# Patient Record
Sex: FEMALE | Race: WHITE | NOT HISPANIC OR LATINO | Employment: OTHER | URBAN - METROPOLITAN AREA
[De-identification: names, ages, dates, MRNs, and addresses within clinical notes are randomized per-mention and may not be internally consistent; named-entity substitution may affect disease eponyms.]

---

## 2017-06-30 ENCOUNTER — HOSPITAL ENCOUNTER (OUTPATIENT)
Dept: RADIOLOGY | Facility: MEDICAL CENTER | Age: 67
End: 2017-06-30
Attending: FAMILY MEDICINE
Payer: MEDICARE

## 2017-06-30 ENCOUNTER — OFFICE VISIT (OUTPATIENT)
Dept: MEDICAL GROUP | Facility: PHYSICIAN GROUP | Age: 67
End: 2017-06-30
Payer: MEDICARE

## 2017-06-30 ENCOUNTER — TELEPHONE (OUTPATIENT)
Dept: MEDICAL GROUP | Facility: PHYSICIAN GROUP | Age: 67
End: 2017-06-30

## 2017-06-30 VITALS
HEART RATE: 104 BPM | RESPIRATION RATE: 12 BRPM | TEMPERATURE: 98.4 F | DIASTOLIC BLOOD PRESSURE: 78 MMHG | BODY MASS INDEX: 18.99 KG/M2 | SYSTOLIC BLOOD PRESSURE: 110 MMHG | WEIGHT: 121 LBS | OXYGEN SATURATION: 96 % | HEIGHT: 67 IN

## 2017-06-30 DIAGNOSIS — R55 SYNCOPE, UNSPECIFIED SYNCOPE TYPE: ICD-10-CM

## 2017-06-30 DIAGNOSIS — M79.89 LEG SWELLING: ICD-10-CM

## 2017-06-30 PROCEDURE — 99214 OFFICE O/P EST MOD 30 MIN: CPT | Performed by: FAMILY MEDICINE

## 2017-06-30 PROCEDURE — 93971 EXTREMITY STUDY: CPT | Mod: LT

## 2017-06-30 ASSESSMENT — PATIENT HEALTH QUESTIONNAIRE - PHQ9: CLINICAL INTERPRETATION OF PHQ2 SCORE: 0

## 2017-06-30 NOTE — PROGRESS NOTES
Chief Complaint   Patient presents with   • Faint     fainted 06/14/2017   • Leg Swelling     lft leg swelling       HISTORY OF PRESENT ILLNESS: Patient is a 66 y.o. female established patient here today for the following concerns:    1. Syncope, unspecified syncope type  Regina is here with concerns over episode of syncope on 6/14/17.  She reports that they had been rushing out that day and forgot to pack a lunch and had been working hard.  They stopped for a sandwich while in a small store. As she ate her sandwich and began feeling nauseated and dizzy and had passed out.  No injury and recovered consciousness quickly without any residual injury.  Since then has not had any problems with dizziness.  She reports that she had not been drinking much water that day.  No further episodes.  No CP, palpitations, or diarrhea.     2. Leg swelling  She has noted in the last week left leg swelling associated with muscle tightness.  No redness, streaking.  She does a lot of car travel.  No SOB, no CP.        Past Medical, Social, and Family history reviewed and updated in EPIC    Allergies:Penicillins    Current Outpatient Prescriptions   Medication Sig Dispense Refill   • Vitamin D, Cholecalciferol, 1000 UNITS Cap Take  by mouth.     • Omega-3 Fatty Acids (FISH OIL) 1000 MG Cap capsule Take 1,000 mg by mouth 3 times a day, with meals.     • aspirin EC (ECOTRIN) 81 MG Tablet Delayed Response Take 81 mg by mouth every day.     • CALCIUM & MAGNESIUM CARBONATES PO Take  by mouth.     • Zinc Sulfate (ZINC 15 PO) Take  by mouth.       No current facility-administered medications for this visit.         ROS:  Review of Systems   Constitutional: Negative for fever, chills, weight loss and malaise/fatigue.   HENT: Negative for ear pain, nosebleeds, congestion, sore throat and neck pain.    Eyes: Negative for blurred vision.   Respiratory: Negative for cough, sputum production, shortness of breath and wheezing.    Cardiovascular:  "Negative for chest pain, palpitations,  and leg swelling.   Gastrointestinal: Negative for heartburn, nausea, vomiting, diarrhea and abdominal pain.   Genitourinary: Negative for dysuria, urgency and frequency.   Musculoskeletal: Negative for myalgias, back pain and joint pain.   Skin: Negative for rash and itching.   Neurological: Negative for dizziness, tingling, tremors, sensory change, focal weakness and headaches.   Endo/Heme/Allergies: Does not bruise/bleed easily.   Psychiatric/Behavioral: Negative for depression, anxiety, suicidal ideas, insomnia and memory loss.      Exam:  Blood pressure 110/78, pulse 104, temperature 36.9 °C (98.4 °F), resp. rate 12, height 1.689 m (5' 6.5\"), weight 54.885 kg (121 lb), SpO2 96 %.  Filed Vitals:    06/30/17 1302 06/30/17 1326 06/30/17 1327 06/30/17 1328   BP: 124/86 116/80 118/72 110/78   Pulse: 98 84 90 104   Temp: 36.9 °C (98.4 °F)      Resp: 12      Height: 1.689 m (5' 6.5\")      Weight: 54.885 kg (121 lb)      SpO2: 96%        General:  Well nourished, well developed in NAD  Head is grossly normal.  Neck: Supple without JVD   Pulmonary:  Normal effort.   Cardiovascular: Regular rate  Extremities: no clubbing, cyanosis, or LEft leg swelling with clearly greater diameter, no calf pain.  Negative Genny 's sign  Psych: affect appropriate      Please note that this dictation was created using voice recognition software. I have made every reasonable attempt to correct obvious errors, but I expect that there are errors of grammar and possibly content that I did not discover before finalizing the note.    Assessment/Plan:  1. Syncope, unspecified syncope type  Suspect that this vasovagal vs orthostatic syncope.  Increase hydration.   - EKG; Future  EKG Interpretation-HR is NSR normal EKG, normal sinus rhythm, no evidence of ischemia         2. Leg swelling  Check   - US-EXTREMITY VENOUS UNILATERAL-LOWER LEFT; Future  R/O DVT            "

## 2017-06-30 NOTE — MR AVS SNAPSHOT
"Radhayl Regina Kayli   2017 1:00 PM   Office Visit   MRN: [id number]    Department:  [facility]   Dept Phone:  [phone]    Description:  Female : 1950   Provider:  Rachelle Isaac M.D.           Reason for Visit     Faint fainted 2017    Leg Swelling lft leg swelling      Allergies as of 2017     Allergen Noted Reactions    Penicillins 2016   Rash    Minor RASH      You were diagnosed with     Syncope, unspecified syncope type   [5640607]       Leg swelling   [[id number]]         Vital Signs     Blood Pressure Pulse Temperature Respirations Height Weight    110/78 mmHg 104 36.9 °C (98.4 °F) 12 1.689 m (5' 6.5\") 54.885 kg (121 lb)    Body Mass Index Oxygen Saturation Smoking Status             19.24 kg/m2 96% Never Smoker          Basic Information     Date Of Birth Sex Race Ethnicity Preferred Language    1950 Female White Non- English      Your appointments     2017  4:30 PM   US EXTREMITY (30) A with [address] US 2   [facility] - ULTRASOUND - [address]    [address]  [place] [address]   [phone]              Problem List              ICD-10-CM Priority Class Noted - Resolved    Visit for screening mammogram Z12.31   2016 - Present    Preventative health care Z00.00   2016 - Present    Screening for colon cancer Z12.11   2016 - Present    Abnormal bone density screening R93.7   2016 - Present      Health Maintenance        Date Due Completion Dates    IMM DTaP/Tdap/Td Vaccine (1 - Tdap) 1968 ---    COLONOSCOPY 1999 ---    IMM ZOSTER VACCINE 2009 ---    BONE DENSITY 2014 ---    IMM PNEUMOCOCCAL 65+ (ADULT) LOW/MEDIUM RISK SERIES (2 of 2 - PPSV23) 2017    MAMMOGRAM 2018, 2016, 2015    PAP SMEAR 2019, 10/21/2014            Current Immunizations     13-VALENT PCV PREVNAR 2016,  Deferred (Patient Schedule)      Below and/or " attached are the medications your provider expects you to take. Review all of your home medications and newly ordered medications with your provider and/or pharmacist. Follow medication instructions as directed by your provider and/or pharmacist. Please keep your medication list with you and share with your provider. Update the information when medications are discontinued, doses are changed, or new medications (including over-the-counter products) are added; and carry medication information at all times in the event of emergency situations     Allergies:  PENICILLINS - Rash               Medications  Valid as of: June 30, 2017 -  2:18 PM    Generic Name Brand Name Tablet Size Instructions for use    Aspirin (Tablet Delayed Response) ECOTRIN 81 MG Take 81 mg by mouth every day.        Calcium & Magnesium Carbonates   Take  by mouth.        Cholecalciferol (Cap) Vitamin D (Cholecalciferol) 1000 UNITS Take  by mouth.        Omega-3 Fatty Acids (Cap) fish oil 1000 MG Take 1,000 mg by mouth 3 times a day, with meals.        Zinc Sulfate   Take  by mouth.        .                 Medicines prescribed today were sent to:     [facility][id number] - [place] - [address] [place] [address]    Phone: [phone] Fax: [phone]    Open 24 Hours?: No      Medication refill instructions:       If your prescription bottle indicates you have medication refills left, it is not necessary to call your provider’s office. Please contact your pharmacy and they will refill your medication.    If your prescription bottle indicates you do not have any refills left, you may request refills at any time through one of the following ways: The online [facility]EE system (except Urgent Care), by calling your provider’s office, or by asking your pharmacy to contact your provider’s office with a refill request. Medication refills are processed only during regular business hours and may not be available until the next business day. Your  provider may request additional information or to have a follow-up visit with you prior to refilling your medication.   *Please Note: Medication refills are assigned a new Rx number when refilled electronically. Your pharmacy may indicate that no refills were authorized even though a new prescription for the same medication is available at the pharmacy. Please request the medicine by name with the pharmacy before contacting your provider for a refill.        Your To Do List     Future Labs/Procedures Complete By Expires    EKG  As directed 6/30/2018    US-EXTREMITY VENOUS UNILATERAL-LOWER LEFT  As directed 6/30/2018         [employer] Access Code: Activation code not generated  Current [email or web address] Status: Active

## 2017-07-01 NOTE — TELEPHONE ENCOUNTER
----- Message from Rachelle Isaac M.D. sent at 6/30/2017  5:17 PM PDT -----  Ultrasound shows no blood clots!

## 2017-07-09 ENCOUNTER — APPOINTMENT (OUTPATIENT)
Dept: RADIOLOGY | Facility: MEDICAL CENTER | Age: 67
End: 2017-07-09
Attending: EMERGENCY MEDICINE
Payer: MEDICARE

## 2017-07-09 ENCOUNTER — RESOLUTE PROFESSIONAL BILLING HOSPITAL PROF FEE (OUTPATIENT)
Dept: HOSPITALIST | Facility: MEDICAL CENTER | Age: 67
End: 2017-07-09
Payer: MEDICARE

## 2017-07-09 ENCOUNTER — HOSPITAL ENCOUNTER (OUTPATIENT)
Facility: MEDICAL CENTER | Age: 67
End: 2017-07-11
Attending: EMERGENCY MEDICINE | Admitting: HOSPITALIST
Payer: MEDICARE

## 2017-07-09 ENCOUNTER — APPOINTMENT (OUTPATIENT)
Dept: RADIOLOGY | Facility: MEDICAL CENTER | Age: 67
End: 2017-07-09
Attending: HOSPITALIST
Payer: MEDICARE

## 2017-07-09 DIAGNOSIS — M79.89 LEG SWELLING: ICD-10-CM

## 2017-07-09 DIAGNOSIS — R55 SYNCOPE, UNSPECIFIED SYNCOPE TYPE: ICD-10-CM

## 2017-07-09 LAB
ALBUMIN SERPL BCP-MCNC: 2.9 G/DL (ref 3.2–4.9)
ALBUMIN/GLOB SERPL: 1 G/DL
ALP SERPL-CCNC: 70 U/L (ref 30–99)
ALT SERPL-CCNC: 18 U/L (ref 2–50)
ANION GAP SERPL CALC-SCNC: 10 MMOL/L (ref 0–11.9)
AST SERPL-CCNC: 28 U/L (ref 12–45)
BASOPHILS # BLD AUTO: 0.4 % (ref 0–1.8)
BASOPHILS # BLD: 0.03 K/UL (ref 0–0.12)
BILIRUB SERPL-MCNC: 0.4 MG/DL (ref 0.1–1.5)
BUN SERPL-MCNC: 8 MG/DL (ref 8–22)
CALCIUM SERPL-MCNC: 8.2 MG/DL (ref 8.5–10.5)
CHLORIDE SERPL-SCNC: 101 MMOL/L (ref 96–112)
CO2 SERPL-SCNC: 24 MMOL/L (ref 20–33)
CORTIS SERPL-MCNC: 13.8 UG/DL (ref 0–23)
CREAT SERPL-MCNC: 0.82 MG/DL (ref 0.5–1.4)
DEPRECATED D DIMER PPP IA-ACNC: 204 NG/ML(D-DU)
EKG IMPRESSION: NORMAL
EOSINOPHIL # BLD AUTO: 0.1 K/UL (ref 0–0.51)
EOSINOPHIL NFR BLD: 1.4 % (ref 0–6.9)
ERYTHROCYTE [DISTWIDTH] IN BLOOD BY AUTOMATED COUNT: 44.2 FL (ref 35.9–50)
GFR SERPL CREATININE-BSD FRML MDRD: >60 ML/MIN/1.73 M 2
GLOBULIN SER CALC-MCNC: 2.8 G/DL (ref 1.9–3.5)
GLUCOSE SERPL-MCNC: 112 MG/DL (ref 65–99)
HCT VFR BLD AUTO: 35.9 % (ref 37–47)
HGB BLD-MCNC: 11.9 G/DL (ref 12–16)
IMM GRANULOCYTES # BLD AUTO: 0.03 K/UL (ref 0–0.11)
IMM GRANULOCYTES NFR BLD AUTO: 0.4 % (ref 0–0.9)
LYMPHOCYTES # BLD AUTO: 2.09 K/UL (ref 1–4.8)
LYMPHOCYTES NFR BLD: 30.2 % (ref 22–41)
MCH RBC QN AUTO: 30.5 PG (ref 27–33)
MCHC RBC AUTO-ENTMCNC: 33.1 G/DL (ref 33.6–35)
MCV RBC AUTO: 92.1 FL (ref 81.4–97.8)
MONOCYTES # BLD AUTO: 0.73 K/UL (ref 0–0.85)
MONOCYTES NFR BLD AUTO: 10.6 % (ref 0–13.4)
NEUTROPHILS # BLD AUTO: 3.93 K/UL (ref 2–7.15)
NEUTROPHILS NFR BLD: 57 % (ref 44–72)
NRBC # BLD AUTO: 0 K/UL
NRBC BLD AUTO-RTO: 0 /100 WBC
PLATELET # BLD AUTO: 435 K/UL (ref 164–446)
PMV BLD AUTO: 9.6 FL (ref 9–12.9)
POTASSIUM SERPL-SCNC: 2.8 MMOL/L (ref 3.6–5.5)
PROT SERPL-MCNC: 5.7 G/DL (ref 6–8.2)
RBC # BLD AUTO: 3.9 M/UL (ref 4.2–5.4)
SODIUM SERPL-SCNC: 135 MMOL/L (ref 135–145)
TROPONIN I SERPL-MCNC: <0.01 NG/ML (ref 0–0.04)
TSH SERPL DL<=0.005 MIU/L-ACNC: 1.65 UIU/ML (ref 0.3–3.7)
WBC # BLD AUTO: 6.9 K/UL (ref 4.8–10.8)

## 2017-07-09 PROCEDURE — A9270 NON-COVERED ITEM OR SERVICE: HCPCS | Performed by: EMERGENCY MEDICINE

## 2017-07-09 PROCEDURE — 82607 VITAMIN B-12: CPT

## 2017-07-09 PROCEDURE — 700102 HCHG RX REV CODE 250 W/ 637 OVERRIDE(OP): Performed by: EMERGENCY MEDICINE

## 2017-07-09 PROCEDURE — 93005 ELECTROCARDIOGRAM TRACING: CPT

## 2017-07-09 PROCEDURE — 70450 CT HEAD/BRAIN W/O DYE: CPT

## 2017-07-09 PROCEDURE — 36415 COLL VENOUS BLD VENIPUNCTURE: CPT

## 2017-07-09 PROCEDURE — 700105 HCHG RX REV CODE 258: Performed by: EMERGENCY MEDICINE

## 2017-07-09 PROCEDURE — G0378 HOSPITAL OBSERVATION PER HR: HCPCS

## 2017-07-09 PROCEDURE — 85379 FIBRIN DEGRADATION QUANT: CPT

## 2017-07-09 PROCEDURE — 80053 COMPREHEN METABOLIC PANEL: CPT

## 2017-07-09 PROCEDURE — 71010 DX-CHEST-PORTABLE (1 VIEW): CPT

## 2017-07-09 PROCEDURE — 84443 ASSAY THYROID STIM HORMONE: CPT

## 2017-07-09 PROCEDURE — 84484 ASSAY OF TROPONIN QUANT: CPT

## 2017-07-09 PROCEDURE — 99220 PR INITIAL OBSERVATION CARE,LEVL III: CPT | Performed by: HOSPITALIST

## 2017-07-09 PROCEDURE — 82533 TOTAL CORTISOL: CPT

## 2017-07-09 PROCEDURE — 82728 ASSAY OF FERRITIN: CPT

## 2017-07-09 PROCEDURE — 99285 EMERGENCY DEPT VISIT HI MDM: CPT

## 2017-07-09 PROCEDURE — 85025 COMPLETE CBC W/AUTO DIFF WBC: CPT

## 2017-07-09 PROCEDURE — 96360 HYDRATION IV INFUSION INIT: CPT

## 2017-07-09 PROCEDURE — 86780 TREPONEMA PALLIDUM: CPT

## 2017-07-09 RX ORDER — BISACODYL 10 MG
10 SUPPOSITORY, RECTAL RECTAL
Status: DISCONTINUED | OUTPATIENT
Start: 2017-07-09 | End: 2017-07-11 | Stop reason: HOSPADM

## 2017-07-09 RX ORDER — SODIUM CHLORIDE 9 MG/ML
2000 INJECTION, SOLUTION INTRAVENOUS ONCE
Status: COMPLETED | OUTPATIENT
Start: 2017-07-09 | End: 2017-07-11

## 2017-07-09 RX ORDER — ACETAMINOPHEN 325 MG/1
650 TABLET ORAL EVERY 6 HOURS PRN
Status: DISCONTINUED | OUTPATIENT
Start: 2017-07-09 | End: 2017-07-11 | Stop reason: HOSPADM

## 2017-07-09 RX ORDER — GINSENG 100 MG
50 CAPSULE ORAL EVERY MORNING
COMMUNITY
End: 2020-07-03

## 2017-07-09 RX ORDER — POLYETHYLENE GLYCOL 3350 17 G/17G
1 POWDER, FOR SOLUTION ORAL
Status: DISCONTINUED | OUTPATIENT
Start: 2017-07-09 | End: 2017-07-11 | Stop reason: HOSPADM

## 2017-07-09 RX ORDER — AMOXICILLIN 250 MG
2 CAPSULE ORAL 2 TIMES DAILY
Status: DISCONTINUED | OUTPATIENT
Start: 2017-07-09 | End: 2017-07-11 | Stop reason: HOSPADM

## 2017-07-09 RX ORDER — ASPIRIN 300 MG/1
300 SUPPOSITORY RECTAL ONCE
Status: COMPLETED | OUTPATIENT
Start: 2017-07-09 | End: 2017-07-09

## 2017-07-09 RX ORDER — SODIUM CHLORIDE 9 MG/ML
1000 INJECTION, SOLUTION INTRAVENOUS ONCE
Status: COMPLETED | OUTPATIENT
Start: 2017-07-09 | End: 2017-07-10

## 2017-07-09 RX ORDER — CHLORAL HYDRATE 500 MG
1000 CAPSULE ORAL EVERY MORNING
COMMUNITY
End: 2020-07-03

## 2017-07-09 RX ORDER — IBUPROFEN 200 MG
950 CAPSULE ORAL DAILY
COMMUNITY
End: 2018-08-13 | Stop reason: CLARIF

## 2017-07-09 RX ORDER — ASPIRIN 81 MG/1
324 TABLET, CHEWABLE ORAL ONCE
Status: COMPLETED | OUTPATIENT
Start: 2017-07-09 | End: 2017-07-09

## 2017-07-09 RX ADMIN — SODIUM CHLORIDE 1000 ML: 9 INJECTION, SOLUTION INTRAVENOUS at 19:25

## 2017-07-09 RX ADMIN — ASPIRIN 324 MG: 81 TABLET, CHEWABLE ORAL at 19:26

## 2017-07-09 ASSESSMENT — LIFESTYLE VARIABLES
DO YOU DRINK ALCOHOL: NO
EVER_SMOKED: NEVER
ALCOHOL_USE: NO

## 2017-07-09 ASSESSMENT — COGNITIVE AND FUNCTIONAL STATUS - GENERAL
MOBILITY SCORE: 24
DAILY ACTIVITIY SCORE: 24
SUGGESTED CMS G CODE MODIFIER MOBILITY: CH
SUGGESTED CMS G CODE MODIFIER DAILY ACTIVITY: CH

## 2017-07-09 ASSESSMENT — PAIN SCALES - GENERAL
PAINLEVEL_OUTOF10: 0
PAINLEVEL_OUTOF10: 0

## 2017-07-09 NOTE — IP AVS SNAPSHOT
" Home Care Instructions                                                                                                                  Name:Azul Milan  Medical Record Number:[id number]  CSN: [id number]    YOB: 1950   Age: 66 y.o.  Sex: female  HT:1.702 m (5' 7\") WT: 55.3 kg (121 lb 14.6 oz)          Admit Date: 7/9/2017     Discharge Date:   Today's Date: 7/11/2017  Attending Doctor:  Rodriguez Gonzales M.D.                  Allergies:  Penicillins            Discharge Instructions       Discharge Instructions    Discharged to home by car with relative. Discharged via walking, hospital escort: Refused.  Special equipment needed: Not Applicable    Be sure to schedule a follow-up appointment with your primary care doctor or any specialists as instructed.     Discharge Plan:   Diet Plan: Discussed  Activity Level: Discussed  Confirmed Follow up Appointment: Patient to Call and Schedule Appointment  Confirmed Symptoms Management: Discussed  Medication Reconciliation Updated: Yes  Influenza Vaccine Indication: Not indicated: Previously immunized this influenza season and > 8 years of age    I understand that a diet low in cholesterol, fat, and sodium is recommended for good health. Unless I have been given specific instructions below for another diet, I accept this instruction as my diet prescription.   Other diet: Healthy diet    Special Instructions: None    · Is patient discharged on Warfarin / Coumadin?   No     · Is patient Post Blood Transfusion?  No    Depression / Suicide Risk    As you are discharged from this [facility] facility, it is important to learn how to keep safe from harming yourself.    Recognize the warning signs:  · Abrupt changes in personality, positive or negative- including increase in energy   · Giving away possessions  · Change in eating patterns- significant weight changes-  positive or negative  · Change in sleeping patterns- unable to sleep or sleeping all the " time   · Unwillingness or inability to communicate  · Depression  · Unusual sadness, discouragement and loneliness  · Talk of wanting to die  · Neglect of personal appearance   · Rebelliousness- reckless behavior  · Withdrawal from people/activities they love  · Confusion- inability to concentrate     If you or a loved one observes any of these behaviors or has concerns about self-harm, here's what you can do:  · Talk about it- your feelings and reasons for harming yourself  · Remove any means that you might use to hurt yourself (examples: pills, rope, extension cords, firearm)  · Get professional help from the community (Mental Health, Substance Abuse, psychological counseling)  · Do not be alone:Call your Safe Contact- someone whom you trust who will be there for you.  · Call your local CRISIS HOTLINE 718-2391 or 071-196-4576  · Call your local Children's Mobile Crisis Response Team [place] (971) 945-8185 or www.DancingAnchovy  · Call the toll free National Suicide Prevention Hotlines   · National Suicide Prevention Lifeline 948-386-PDBO (7706)  · [employer] Hope Line Network 800-SUICIDE (372-1585)    Syncope  Syncope is a medical term for fainting or passing out. This means you lose consciousness and drop to the ground. People are generally unconscious for less than 5 minutes. You may have some muscle twitches for up to 15 seconds before waking up and returning to normal. Syncope occurs more often in older adults, but it can happen to anyone. While most causes of syncope are not dangerous, syncope can be a sign of a serious medical problem. It is important to seek medical care.   CAUSES   Syncope is caused by a sudden drop in blood flow to the brain. The specific cause is often not determined. Factors that can bring on syncope include:  · Taking medicines that lower blood pressure.  · Sudden changes in posture, such as standing up quickly.  · Taking more medicine than prescribed.  · Standing in one place  for too long.  · Seizure disorders.  · Dehydration and excessive exposure to heat.  · Low blood sugar (hypoglycemia).  · Straining to have a bowel movement.  · Heart disease, irregular heartbeat, or other circulatory problems.  · Fear, emotional distress, seeing blood, or severe pain.  SYMPTOMS   Right before fainting, you may:  · Feel dizzy or light-headed.  · Feel nauseous.  · See all white or all black in your field of vision.  · Have cold, clammy skin.  DIAGNOSIS   Your health care provider will ask about your symptoms, perform a physical exam, and perform an electrocardiogram (ECG) to record the electrical activity of your heart. Your health care provider may also perform other heart or blood tests to determine the cause of your syncope which may include:  · Transthoracic echocardiogram (TTE). During echocardiography, sound waves are used to evaluate how blood flows through your heart.  · Transesophageal echocardiogram (NAHID).  · Cardiac monitoring. This allows your health care provider to monitor your heart rate and rhythm in real time.  · Holter monitor. This is a portable device that records your heartbeat and can help diagnose heart arrhythmias. It allows your health care provider to track your heart activity for several days, if needed.  · Stress tests by exercise or by giving medicine that makes the heart beat faster.  TREATMENT   In most cases, no treatment is needed. Depending on the cause of your syncope, your health care provider may recommend changing or stopping some of your medicines.  HOME CARE INSTRUCTIONS  · Have someone stay with you until you feel stable.  · Do not drive, use machinery, or play sports until your health care provider says it is okay.  · Keep all follow-up appointments as directed by your health care provider.  · Lie down right away if you start feeling like you might faint. Breathe deeply and steadily. Wait until all the symptoms have passed.  · Drink enough fluids to keep your  urine clear or pale yellow.  · If you are taking blood pressure or heart medicine, get up slowly and take several minutes to sit and then stand. This can reduce dizziness.  SEEK IMMEDIATE MEDICAL CARE IF:   · You have a severe headache.  · You have unusual pain in the chest, abdomen, or back.  · You are bleeding from your mouth or rectum, or you have black or tarry stool.  · You have an irregular or very fast heartbeat.  · You have pain with breathing.  · You have repeated fainting or seizure-like jerking during an episode.  · You faint when sitting or lying down.  · You have confusion.  · You have trouble walking.  · You have severe weakness.  · You have vision problems.  If you fainted, call your local emergency services (911 in [place] Do not drive yourself to the hospital.      This information is not intended to replace advice given to you by your health care provider. Make sure you discuss any questions you have with your health care provider.     Document Released: 12/18/2006 Document Revised: 05/03/2016 Document Reviewed: 02/15/2013  [employer] Interactive Patient Education ©2016 Elsevier Inc.    Iron tablets, capsules, extended-release tablets  What is this medicine?  IRON (AHY aureliano) replaces iron that is essential to healthy red blood cells. Iron is used to treat iron deficiency anemia. Anemia may cause problems like tiredness, shortness of breath, or slowed growth in children. Only take iron if your doctor has told you to. Do not treat yourself with iron if you are feeling tired. Most healthy people get enough iron in their diets, particularly if they eat cereals, meat, poultry, and fish.  This medicine may be used for other purposes; ask your health care provider or pharmacist if you have questions.  COMMON BRAND NAME(S): Bifera, Duofer, Feosol Complete, Feosol Natural Release, Feosol, Feratab, Ferate , Fergon, Fero-Grad 500, Ferretts, Ferrimin , Hernesto-Sequels, Hemocyte, Nephro-Amos, NovaFerrum, ProFe, Slow  Fe, Slow Iron , Tandem  What should I tell my health care provider before I take this medicine?  They need to know if you have any of these conditions:  -frequently drink alcohol  -bowel disease  -hemolytic anemia  -iron overload (hemochromatosis, hemosiderosis)  -liver disease  -problems with swallowing  -stomach ulcer or other stomach problems  -an unusual or allergic reaction to iron, other medicines, foods, dyes, or preservatives  -pregnant or trying to get pregnant  -breast-feeding  How should I use this medicine?  Take this medicine by mouth with a glass of water or fruit juice. Follow the directions on the prescription label. Swallow whole. Do not crush or chew. Take this medicine in an upright or sitting position. Try to take any bedtime doses at least 10 minutes before lying down. You may take this medicine with food. Take your medicine at regular intervals. Do not take your medicine more often than directed. Do not stop taking except on your doctor's advice.  Talk to your pediatrician regarding the use of this medicine in children. While this drug may be prescribed for selected conditions, precautions do apply.  Overdosage: If you think you have taken too much of this medicine contact a poison control center or emergency room at once.  NOTE: This medicine is only for you. Do not share this medicine with others.  What if I miss a dose?  If you miss a dose, take it as soon as you can. If it is almost time for your next dose, take only that dose. Do not take double or extra doses.  What may interact with this medicine?  If you are taking this iron product, you should not take iron in any other medicine or dietary supplement.  This medicine may also interact with the following medications:  -alendronate  -antacids  -cefdinir  -chloramphenicol  -cholestyramine  -deferoxamine  -dimercaprol  -etidronate  -medicines for stomach ulcers or other stomach problems  -pancreatic enzymes  -quinolone antibiotics  (examples: Cipro, Floxin, Levaquin, Tequin and others)  -risedronate  -tetracycline antibiotics (examples: doxycycline, tetracycline, minocycline, and others)  -thyroid hormones  This list may not describe all possible interactions. Give your health care provider a list of all the medicines, herbs, non-prescription drugs, or dietary supplements you use. Also tell them if you smoke, drink alcohol, or use illegal drugs. Some items may interact with your medicine.  What should I watch for while using this medicine?  Use iron supplements only as directed by your health care professional. You will need important blood work while you are taking this medicine. It may take 3 to 6 months of therapy to treat low iron levels. Pregnant women should follow the dose and length of iron treatment as directed by their doctors.  Do not use iron longer than prescribed, and do not take a higher dose than recommended. Long-term use may cause excess iron to build-up in the body.  Do not take iron with antacids. If you need to take an antacid, take it 2 hours after a dose of iron.  What side effects may I notice from receiving this medicine?  Side effects that you should report to your doctor or health care professional as soon as possible:  -allergic reactions like skin rash, itching or hives, swelling of the face, lips, or tongue  -blue lips, nails, or palms  -dark colored stools (this may be due to the iron, but can indicate a more serious condition)  -drowsiness  -pain with or difficulty swallowing  -pale or clammy skin  -seizures  -stomach pain  -unusually weak or tired  -vomiting  -weak, fast, or irregular heartbeat  Side effects that usually do not require medical attention (report to your doctor or health care professional if they continue or are bothersome):  -constipation  -indigestion  -nausea or stomach upset  This list may not describe all possible side effects. Call your doctor for medical advice about side effects. You may  report side effects to FDA at 3-800-FDA-1088.  Where should I keep my medicine?  Keep out of the reach of children. Even small amounts of iron can be harmful to a child.  Store at room temperature between 15 and 30 degrees C (59 and 86 degrees F). Keep container tightly closed. Throw away any unused medicine after the expiration date.  NOTE: This sheet is a summary. It may not cover all possible information. If you have questions about this medicine, talk to your doctor, pharmacist, or health care provider.  © 2014, Elsevier/Gold Standard. (5/5/2009 5:03:41 PM)      Follow-up Information     1. Follow up with Rachelle Isaac M.D. In 1 week.    Specialty:  Family Medicine    Contact information    [address] [address] [phone]           Discharge Medication Instructions:    Below are the medications your physician expects you to take upon discharge:    Review all your home medications and newly ordered medications with your doctor and/or pharmacist. Follow medication instructions as directed by your doctor and/or pharmacist.    Please keep your medication list with you and share with your physician.               Medication List      START taking these medications        Instructions    Morning Afternoon Evening Bedtime    ferrous sulfate 325 (65 FE) MG tablet   Last time this was given:  325 mg on 7/11/2017 11:00 AM   Next Dose Due:  Tomorrow 7/12        Take 1 Tab by mouth every day.   Dose:  325 mg                          CONTINUE taking these medications        Instructions    Morning Afternoon Evening Bedtime    aspirin EC 81 MG Tbec   Last time this was given:  81 mg on 7/11/2017  8:32 AM   Commonly known as:  ECOTRIN   Next Dose Due:  Tomorrow 7/12        Take 81 mg by mouth every day.   Dose:  81 mg                        calcium citrate 950 MG Tabs   Commonly known as:  CALCITRATE   Next Dose Due:  Today 7/11        Take 950 mg by mouth every day.   Dose:  950 mg                         fish oil 1000 MG Caps capsule   Next Dose Due:  Today 7/11        Take 1,000 mg by mouth every day.   Dose:  1000 mg                        Vitamin D (Cholecalciferol) 1000 UNITS Caps   Next Dose Due:  Today 7/11        Take 1,000 Units by mouth every day.   Dose:  1000 Units                        Zinc 50 MG Tabs   Next Dose Due:  Today 7/11        Take 50 mg by mouth every day.   Dose:  50 mg                             Where to Get Your Medications      These medications were sent to [facility] #[id number] - [place] - [address]  [address] [place] [address]     Phone:  [phone]    - ferrous sulfate 325 (65 FE) MG tablet            Instructions           Diet / Nutrition:    Follow any diet instructions given to you by your doctor or the dietician, including how much salt (sodium) you are allowed each day.    If you are overweight, talk to your doctor about a weight reduction plan.    Activity:    Remain physically active following your doctor's instructions about exercise and activity.    Rest often.     Any time you become even a little tired or short of breath, SIT DOWN and rest.    Worsening Symptoms:    Report any of the following signs and symptoms to the doctor's office immediately:    *Pain of jaw, arm, or neck  *Chest pain not relieved by medication                               *Dizziness or loss of consciousness  *Difficulty breathing even when at rest   *More tired than usual                                       *Bleeding drainage or swelling of surgical site  *Swelling of feet, ankles, legs or stomach                 *Fever (>100ºF)  *Pink or blood tinged sputum  *Weight gain (3lbs/day or 5lbs /week)           *Shock from internal defibrillator (if applicable)  *Palpitations or irregular heartbeats                *Cool and/or numb extremities    Stroke Awareness    Common Risk Factors for Stroke include:    Age  Atrial Fibrillation  Carotid Artery Stenosis  Diabetes Mellitus  Excessive  alcohol consumption  High blood pressure  Overweight   Physical inactivity  Smoking    Warning signs and symptoms of a stroke include:    *Sudden numbness or weakness of the face, arm or leg (especially on one side of the body).  *Sudden confusion, trouble speaking or understanding.  *Sudden trouble seeing in one or both eyes.  *Sudden trouble walking, dizziness, loss of balance or coordination.Sudden severe headache with no known cause.    It is very important to get treatment quickly when a stroke occurs. If you experience any of the above warning signs, call 911 immediately.                   Disclaimer         Quit Smoking / Tobacco Use:    I understand the use of any tobacco products increases my chance of suffering from future heart disease or stroke and could cause other illnesses which may shorten my life. Quitting the use of tobacco products is the single most important thing I can do to improve my health. For further information on smoking / tobacco cessation call a Toll Free Quit Line at 1-893.849.2570 (*National [employer] [place]) or 1-945.267.9298 (American Lung Association) or you can access the web based program at www.lungGlamorous Travel.org.    [place] Tobacco Users Help Line:  (102) 186-5277       Toll Free: 1-523.413.1765  Quit Tobacco Program [facility] Management Services (800)548-3464    Crisis Hotline:    [place] Crisis Hotline:  7-372-OVWPWJW or 1-361.848.2203    [place] Crisis Hotline:    1-686.377.9363 or 098-622-7046    Discharge Survey:   [facility] you for [facility]. We hope we did everything we could to make your hospital stay a pleasant one. You may be receiving a phone survey and we would appreciate your time and participation in answering the questions. Your input is very valuable to us in our efforts to improve our service to our patients and their families.        My signature on this form indicates that:    1. I have reviewed and understand the above information.  2. My questions  regarding this information have been answered to my satisfaction.  3. I have formulated a plan with my discharge nurse to obtain my prescribed medications for home.                  Disclaimer         __________________________________                     __________       ________                       Patient Signature                                                 Date                    Time

## 2017-07-09 NOTE — IP AVS SNAPSHOT
7/11/2017    Azul Milan  [address] [place] [address]    Dear Azul:    Replaced by [facility] wants to ensure your discharge home is safe and you or your loved ones have had all of your questions answered regarding your care after you leave the hospital.    Below is a list of resources and contact information should you have any questions regarding your hospital stay, follow-up instructions, or active medical symptoms.    Questions or Concerns Regarding… Contact   Medical Questions Related to Your Discharge  (7 days a week, 8am-5pm) Contact a Nurse Care Coordinator   [phone]   Medical Questions Not Related to Your Discharge  (24 hours a day / 7 days a week)  Contact the Nurse Health Line   [phone]    Medications or Discharge Instructions Refer to your discharge packet   or contact your [facility] Primary Care Provider   [phone]   Follow-up Appointment(s) Schedule your appointment via [employer]   or contact Scheduling [phone]   Billing Review your statement via [employer]  or contact Billing [phone]   Medical Records Review your records via [employer]   or contact Medical Records [phone]     You may receive a telephone call within two days of discharge. This call is to make certain you understand your discharge instructions and have the opportunity to have any questions answered. You can also easily access your medical information, test results and upcoming appointments via the [employer] free online health management tool. You can learn more and sign up at [employer]. For assistance setting up your [employer] account, please call [phone].    Once again, we want to ensure your discharge home is safe and that you have a clear understanding of any next steps in your care. If you have any questions or concerns, please do not hesitate to contact us, we are here for you. Thank you for choosing [facility] for your healthcare needs.    Sincerely,    Your [facility] Healthcare Team

## 2017-07-09 NOTE — IP AVS SNAPSHOT
Liquid Accounts Access Code: Activation code not generated  Current Liquid Accounts Status: Active    Out[employer]  A secure, online tool to manage your health information     [employer]’s Liquid Accounts® is a secure, online tool that connects you to your personalized health information from the privacy of your home -- day or night - making it very easy for you to manage your healthcare. Once the activation process is completed, you can even access your medical information using the Liquid Accounts lynne, which is available for free in the Apple Lynne store or Google Play store.     Liquid Accounts provides the following levels of access (as shown below):   My Chart Features   [facility] Primary Care Doctor [facility]  Specialists [facility]  Urgent  Care Non-[facility]  Primary Care  Doctor   Email your healthcare team securely and privately 24/7 X X X X   Manage appointments: schedule your next appointment; view details of past/upcoming appointments X      Request prescription refills. X      View recent personal medical records, including lab and immunizations X X X X   View health record, including health history, allergies, medications X X X X   Read reports about your outpatient visits, procedures, consult and ER notes X X X X   See your discharge summary, which is a recap of your hospital and/or ER visit that includes your diagnosis, lab results, and care plan. X X       How to register for Liquid Accounts:  1. Go to  [email or web address].  2. Click on the Sign Up Now box, which takes you to the New Member Sign Up page. You will need to provide the following information:  a. Enter your Liquid Accounts Access Code exactly as it appears at the top of this page. (You will not need to use this code after you’ve completed the sign-up process. If you do not sign up before the expiration date, you must request a new code.)   b. Enter your date of birth.   c. Enter your home email address.   d. Click Submit, and follow the next screen’s instructions.  3. Create a Liquid Accounts ID. This will  be your [employer] login ID and cannot be changed, so think of one that is secure and easy to remember.  4. Create a [employer] password. You can change your password at any time.  5. Enter your Password Reset Question and Answer. This can be used at a later time if you forget your password.   6. Enter your e-mail address. This allows you to receive e-mail notifications when new information is available in [employer].  7. Click Sign Up. You can now view your health information.    For assistance activating your [employer] account, call [phone]

## 2017-07-09 NOTE — IP AVS SNAPSHOT
" <p align=\"LEFT\"><IMG SRC=\"//[email or web address]\" alt=\"Image\" WIDTH=\"50%\" HEIGHT=\"200\" BORDER=\"\"></p>                   Name:Azul Milan  Medical Record Number:[id number]  CSN: [id number]    YOB: 1950   Age: 66 y.o.  Sex: female  HT:1.702 m (5' 7\") WT: 55.3 kg (121 lb 14.6 oz)          Admit Date: 7/9/2017     Discharge Date:   Today's Date: 7/11/2017  Attending Doctor:  Rodriguez Gonzales M.D.                  Allergies:  Penicillins          Follow-up Information     1. Follow up with Rachelle Isaac M.D. In 1 week.    Specialty:  Family Medicine    Contact information    [address] [address] [phone]           Medication List      Take these Medications        Instructions    aspirin EC 81 MG Tbec   Commonly known as:  ECOTRIN    Take 81 mg by mouth every day.   Dose:  81 mg       calcium citrate 950 MG Tabs   Commonly known as:  CALCITRATE    Take 950 mg by mouth every day.   Dose:  950 mg       ferrous sulfate 325 (65 FE) MG tablet    Take 1 Tab by mouth every day.   Dose:  325 mg       fish oil 1000 MG Caps capsule    Take 1,000 mg by mouth every day.   Dose:  1000 mg       Vitamin D (Cholecalciferol) 1000 UNITS Caps    Take 1,000 Units by mouth every day.   Dose:  1000 Units       Zinc 50 MG Tabs    Take 50 mg by mouth every day.   Dose:  50 mg         "

## 2017-07-10 ENCOUNTER — APPOINTMENT (OUTPATIENT)
Dept: RADIOLOGY | Facility: MEDICAL CENTER | Age: 67
End: 2017-07-10
Attending: HOSPITALIST
Payer: MEDICARE

## 2017-07-10 PROBLEM — E87.6 HYPOKALEMIA: Status: ACTIVE | Noted: 2017-07-10

## 2017-07-10 PROBLEM — R41.3 MEMORY LOSS: Status: ACTIVE | Noted: 2017-07-10

## 2017-07-10 PROBLEM — D64.9 ANEMIA: Status: ACTIVE | Noted: 2017-07-10

## 2017-07-10 LAB
ANION GAP SERPL CALC-SCNC: 6 MMOL/L (ref 0–11.9)
APPEARANCE UR: CLEAR
BILIRUB UR QL STRIP.AUTO: NEGATIVE
BUN SERPL-MCNC: 7 MG/DL (ref 8–22)
CALCIUM SERPL-MCNC: 7.2 MG/DL (ref 8.5–10.5)
CHLORIDE SERPL-SCNC: 108 MMOL/L (ref 96–112)
CHOLEST SERPL-MCNC: 125 MG/DL (ref 100–199)
CO2 SERPL-SCNC: 25 MMOL/L (ref 20–33)
COLOR UR: YELLOW
CREAT SERPL-MCNC: 0.6 MG/DL (ref 0.5–1.4)
ERYTHROCYTE [DISTWIDTH] IN BLOOD BY AUTOMATED COUNT: 44 FL (ref 35.9–50)
FERRITIN SERPL-MCNC: 44.4 NG/ML (ref 10–291)
GFR SERPL CREATININE-BSD FRML MDRD: >60 ML/MIN/1.73 M 2
GLUCOSE SERPL-MCNC: 99 MG/DL (ref 65–99)
GLUCOSE UR STRIP.AUTO-MCNC: NEGATIVE MG/DL
HCT VFR BLD AUTO: 30.5 % (ref 37–47)
HDLC SERPL-MCNC: 46 MG/DL
HGB BLD-MCNC: 10.2 G/DL (ref 12–16)
IRON SATN MFR SERPL: 9 % (ref 15–55)
IRON SERPL-MCNC: 25 UG/DL (ref 40–170)
KETONES UR STRIP.AUTO-MCNC: 15 MG/DL
LDLC SERPL CALC-MCNC: 68 MG/DL
LEUKOCYTE ESTERASE UR QL STRIP.AUTO: NEGATIVE
MAGNESIUM SERPL-MCNC: 2 MG/DL (ref 1.5–2.5)
MCH RBC QN AUTO: 30.5 PG (ref 27–33)
MCHC RBC AUTO-ENTMCNC: 33.4 G/DL (ref 33.6–35)
MCV RBC AUTO: 91.3 FL (ref 81.4–97.8)
MICRO URNS: ABNORMAL
NITRITE UR QL STRIP.AUTO: NEGATIVE
PH UR STRIP.AUTO: 6 [PH]
PLATELET # BLD AUTO: 344 K/UL (ref 164–446)
PMV BLD AUTO: 9 FL (ref 9–12.9)
POTASSIUM SERPL-SCNC: 2.7 MMOL/L (ref 3.6–5.5)
POTASSIUM SERPL-SCNC: 2.9 MMOL/L (ref 3.6–5.5)
PROT UR QL STRIP: NEGATIVE MG/DL
RBC # BLD AUTO: 3.34 M/UL (ref 4.2–5.4)
RBC UR QL AUTO: NEGATIVE
SODIUM SERPL-SCNC: 139 MMOL/L (ref 135–145)
SP GR UR STRIP.AUTO: 1.01
TIBC SERPL-MCNC: 265 UG/DL (ref 250–450)
TREPONEMA PALLIDUM IGG+IGM AB [PRESENCE] IN SERUM OR PLASMA BY IMMUNOASSAY: NON REACTIVE
TRIGL SERPL-MCNC: 55 MG/DL (ref 0–149)
TROPONIN I SERPL-MCNC: <0.01 NG/ML (ref 0–0.04)
UROBILINOGEN UR STRIP.AUTO-MCNC: 0.2 MG/DL
VIT B12 SERPL-MCNC: 1472 PG/ML (ref 211–911)
WBC # BLD AUTO: 6.1 K/UL (ref 4.8–10.8)

## 2017-07-10 PROCEDURE — 70547 MR ANGIOGRAPHY NECK W/O DYE: CPT

## 2017-07-10 PROCEDURE — A9502 TC99M TETROFOSMIN: HCPCS

## 2017-07-10 PROCEDURE — 95819 EEG AWAKE AND ASLEEP: CPT

## 2017-07-10 PROCEDURE — 700111 HCHG RX REV CODE 636 W/ 250 OVERRIDE (IP): Performed by: HOSPITALIST

## 2017-07-10 PROCEDURE — 700105 HCHG RX REV CODE 258: Performed by: HOSPITALIST

## 2017-07-10 PROCEDURE — 70551 MRI BRAIN STEM W/O DYE: CPT

## 2017-07-10 PROCEDURE — 70544 MR ANGIOGRAPHY HEAD W/O DYE: CPT

## 2017-07-10 PROCEDURE — 700102 HCHG RX REV CODE 250 W/ 637 OVERRIDE(OP): Performed by: HOSPITALIST

## 2017-07-10 PROCEDURE — 99226 PR SUBSEQUENT OBSERVATION CARE,LEVEL III: CPT | Performed by: HOSPITALIST

## 2017-07-10 PROCEDURE — 83540 ASSAY OF IRON: CPT

## 2017-07-10 PROCEDURE — 83735 ASSAY OF MAGNESIUM: CPT

## 2017-07-10 PROCEDURE — 81003 URINALYSIS AUTO W/O SCOPE: CPT

## 2017-07-10 PROCEDURE — 83550 IRON BINDING TEST: CPT

## 2017-07-10 PROCEDURE — A9270 NON-COVERED ITEM OR SERVICE: HCPCS | Performed by: HOSPITALIST

## 2017-07-10 PROCEDURE — 84132 ASSAY OF SERUM POTASSIUM: CPT | Mod: 59

## 2017-07-10 PROCEDURE — G0378 HOSPITAL OBSERVATION PER HR: HCPCS

## 2017-07-10 PROCEDURE — 80048 BASIC METABOLIC PNL TOTAL CA: CPT

## 2017-07-10 PROCEDURE — 36415 COLL VENOUS BLD VENIPUNCTURE: CPT

## 2017-07-10 PROCEDURE — 80061 LIPID PANEL: CPT

## 2017-07-10 PROCEDURE — 84484 ASSAY OF TROPONIN QUANT: CPT

## 2017-07-10 PROCEDURE — 85027 COMPLETE CBC AUTOMATED: CPT

## 2017-07-10 RX ORDER — POTASSIUM CHLORIDE 7.45 MG/ML
10 INJECTION INTRAVENOUS
Status: COMPLETED | OUTPATIENT
Start: 2017-07-10 | End: 2017-07-10

## 2017-07-10 RX ORDER — POTASSIUM CHLORIDE 20 MEQ/1
40 TABLET, EXTENDED RELEASE ORAL ONCE
Status: COMPLETED | OUTPATIENT
Start: 2017-07-10 | End: 2017-07-10

## 2017-07-10 RX ORDER — FERROUS SULFATE 325(65) MG
325 TABLET ORAL
Status: DISCONTINUED | OUTPATIENT
Start: 2017-07-10 | End: 2017-07-11 | Stop reason: HOSPADM

## 2017-07-10 RX ADMIN — POTASSIUM CHLORIDE 10 MEQ: 7.46 INJECTION, SOLUTION INTRAVENOUS at 18:00

## 2017-07-10 RX ADMIN — ASPIRIN 81 MG: 81 TABLET ORAL at 08:25

## 2017-07-10 RX ADMIN — Medication 325 MG: at 23:03

## 2017-07-10 RX ADMIN — THERA TABS 1 TABLET: TAB at 18:30

## 2017-07-10 RX ADMIN — SODIUM CHLORIDE 2000 ML: 9 INJECTION, SOLUTION INTRAVENOUS at 08:25

## 2017-07-10 RX ADMIN — POTASSIUM CHLORIDE 10 MEQ: 7.46 INJECTION, SOLUTION INTRAVENOUS at 21:36

## 2017-07-10 RX ADMIN — POTASSIUM CHLORIDE 40 MEQ: 1500 TABLET, EXTENDED RELEASE ORAL at 04:35

## 2017-07-10 RX ADMIN — POTASSIUM CHLORIDE 40 MEQ: 1500 TABLET, EXTENDED RELEASE ORAL at 18:00

## 2017-07-10 ASSESSMENT — ENCOUNTER SYMPTOMS
TINGLING: 0
EYE PAIN: 0
TREMORS: 0
MEMORY LOSS: 1
BACK PAIN: 0
NAUSEA: 0
DIZZINESS: 0
COUGH: 0
HEARTBURN: 0
SPUTUM PRODUCTION: 0
CHILLS: 0
MYALGIAS: 0
HEADACHES: 0
SPEECH CHANGE: 0
HEMOPTYSIS: 0
BLURRED VISION: 0
FEVER: 0

## 2017-07-10 ASSESSMENT — PAIN SCALES - GENERAL
PAINLEVEL_OUTOF10: 0

## 2017-07-10 NOTE — CARE PLAN
Problem: Communication  Goal: The ability to communicate needs accurately and effectively will improve  Outcome: PROGRESSING AS EXPECTED  Pt educated on medication and POC. Pt verbalized understanding.     Problem: Safety  Goal: Will remain free from injury  Outcome: PROGRESSING AS EXPECTED  Bedside table and call light are within reach. Bed is locked and in the lowest position. Treaded socks are on. Patient educated to call for assistance.

## 2017-07-10 NOTE — EEG PROGRESS NOTE
"EEG 07/10/2017 3:08 PM    ROUTINE ELECTROENCEPHALOGRAM REPORT        INDICATION:     admitted for spell of confusion and 1.5 second (near syncope) \"pause\" Pt's family reports confusion and forgetfulness worsening in the last year.    TECHNIQUE: 30 channel routine electroencephalogram (EEG) was performed in accordance with the international 10-20 system. The study was reviewed in bipolar and referential montages. The recording examined the patient during wakeful and drowsy state(s).     DESCRIPTION OF THE RECORD:      Background rhythm during awake stage shows well-organized, well-developed, average voltage 10 to 11 hertz alpha activity in the posterior regions.  It blocks with eye opening and it is bilaterally synchronous and symmetrical.  No spike-and-wave discharges. Some delta bursts at times, generalized and at times focal and more over the left  Photic stimulation did not produce any abnormalities. Hyperventilation did not produce any abnormalities.  No abnormalities were found during the procedure. Stage I sleep was achieved.      ACTIVATION PROCEDURES:      Hyperventilation and Photic Stimulation were done    ICTAL AND/OR INTERICTAL FINDINGS:    No focal or generalized epileptiform activity noted. Some delta bursts at times, generalized and at times focal and more over the left   No clinical events or seizures were reported or recorded during the study.      EKG: sampling of the EKG recording demonstrated sinus rhythm.        INTERPRETATION:    ________________________________________________________________________    Mild nonspecific cortical dysfunction, more over left     This abnormality remains not specific-- old lesion?     No epileptiform discharges    Of note, unremarkable EEG does not completely exclude the diagnosis  of seizures since seizure is an episodic phenomena.  Clinical correlation may help     If clinical suspicion of seizure remains high.  Prolonged outpatient EEG   monitoring may be of " help and outpatient clinic follow up may help    ________________________________________________________________________

## 2017-07-10 NOTE — H&P
"PATIENT ID:  NAME:  Azul Milan  MRN:               [id number]  YOB: 1950    PMD: Rachelle Isaac M.D.    CC: Brought in by family members with multiple issues, including passing out, confusion, weight loss, forgetfulness and rash    HPI: Azul Milan is a 66 y.o. female who presents with a recent syncopal episode approximately 3-1/2 weeks ago with other near syncopal episodes since then as well as 1 year of worsening mentation with increasing forgetfulness, loss of appetite and unintentional weight loss. Otherwise, the patient has no significant medical issues and at baseline is very healthy and active. She reports that 3-1/2 weeks ago, she was at [place] during hot weather and had not prepared her own lunch. She states that she typically is quite \"picky\" and likes organic foods that she prepares herself. She bought a prepared sandwich from the grocery store and it felt \"off\". She completed half of that and an hour later became lightheaded, dizzy, and syncopized. She was caught by her  and once he laid her down to the ground, she regained consciousness. It does not appear she was postictal at time, there is only mild confusion. She had no head trauma or incontinence. She denies any preceding headaches, shortness of breath, or chest pain. The next day, she had a near syncopal episode while skiing. Immediately after this, she had right calf swelling and reports that it was \"rock hard\". She was seen by her primary care physician who obtained an MRI of the leg as well as an ultrasound both of which were negative. She denies any recent confinement such as long car rides, being bedbound, or long plane rides. Her swelling self resolved. Yesterday, the patient had a long strenuous bike ride which she tolerated without any issues. Today however she had a 30 minute walk of moderate intensity and she felt like she \"overdid it\". Her  states that she's had weight loss which was " "unintentional, worsening confusion and forgetfulness, loss of appetite, and this has been ongoing for the last year. They report that she had a recent colonoscopy which was unremarkable. I do not have the results of those to confirm.                REVIEW OF SYSTEMS  A full review of systems was completed and all pertinent positives and negatives are included in the HPI above by AMA/CMS criteria.    PAST MEDICAL HISTORY  History reviewed. No pertinent past medical history.    PAST SURGICAL HISTORY  Past Surgical History   Procedure Laterality Date   • Tonsillectomy     • Tubal coagulation laparoscopic bilateral         FAMILY HISTORY  Family History   Problem Relation Age of Onset   • Dementia Mother    • Cancer Sister      breast   • Other Brother      wegeners grandulomatosis   • Lung Disease Paternal Grandfather      TB. Lung removal       SOCIAL HISTORY  Social History   Substance Use Topics   • Smoking status: Never Smoker    • Smokeless tobacco: Never Used   • Alcohol Use: Yes      Comment: very occasionally       ALLERGIES  Allergies as of 07/09/2017 - Nathan as Reviewed 07/09/2017   Allergen Reaction Noted   • Penicillins Rash 06/17/2016       OUTPATIENT MEDICATIONS     Medication List      ASK your doctor about these medications       Instructions    aspirin EC 81 MG Tbec   Commonly known as:  ECOTRIN    Take 81 mg by mouth every day.   Dose:  81 mg       CALCIUM & MAGNESIUM CARBONATES PO    Take  by mouth.       fish oil 1000 MG Caps capsule    Take 1,000 mg by mouth 3 times a day, with meals.   Dose:  1000 mg       Vitamin D (Cholecalciferol) 1000 UNITS Caps    Take  by mouth.       ZINC 15 PO    Take  by mouth.             PHYSICAL EXAM:  Blood pressure 128/72, pulse 85, temperature 37.6 °C (99.7 °F), resp. rate 16, height 1.702 m (5' 7\"), weight 54.4 kg (119 lb 14.9 oz), SpO2 97 %.  Oxygen: Pulse Oximetry: 97 %, O2 Delivery: None (Room Air)    Gen: NAD, comfortable  HEENT: NCAT, EOMI, no LAD, no JVD, neck " supple, MMM, no supraclavicular lymphadenopathy  Heart: +S1/S2, RRR, no murmur, rubs or gallops appreciated  Lungs: CTAB, no accessory muscle use, no wheezes, rubs or rhonchi heard  GI: NTND, soft, +BS, no rebound/guarding, no hepatosplenomegaly  Extremities: Warm, well-perfused, no cyanosis/clubbing, no peripheral edema, distal pulses are intact  MSK: 5/5 strength in all 4 extremities, sensation intact to light touch  Neuro: AO x 3, CN II-XII grossly intact    LAB TESTS and IMAGES:   Recent Labs      07/09/17 1910   WBC  6.9   RBC  3.90*   HEMOGLOBIN  11.9*   HEMATOCRIT  35.9*   MCV  92.1   MCH  30.5   RDW  44.2   PLATELETCT  435   MPV  9.6   NEUTSPOLYS  57.00   LYMPHOCYTES  30.20   MONOCYTES  10.60   EOSINOPHILS  1.40   BASOPHILS  0.40     Recent Labs      07/09/17 1910   TROPONINI  <0.01         Recent Labs      07/09/17 1910   SODIUM  135   POTASSIUM  2.8*   CHLORIDE  101   CO2  24   BUN  8   CREATININE  0.82   CALCIUM  8.2*   ALBUMIN  2.9*     Estimated GFR/CRCL = Estimated Creatinine Clearance: 57.2 mL/min (by C-G formula based on Cr of 0.82).  Recent Labs      07/09/17 1910   GLUCOSE  112*     GLYCOHEMOGLOBIN   Date/Time Value Ref Range Status   07/27/2016 06:59 AM 5.7* 0.0 - 5.6 % Final     Comment:     Increased risk for diabetes:  5.7 -6.4%  Diabetes:  >6.4%  Glycemic control for adults with diabetes:  <7.0%  The above interpretations are per ADA guidelines.  Diagnosis  of diabetes mellitus on the basis of elevated Hemoglobin A1c  should be confirmed by repeating the Hb A1c test.       FREE T-4   Date/Time Value Ref Range Status   09/17/2016 08:30 AM 0.88 0.53 - 1.43 ng/dL Final     Recent Labs      07/09/17 1910   ASTSGOT  28   ALTSGPT  18   TBILIRUBIN  0.4   ALKPHOSPHAT  70   GLOBULIN  2.8           Invalid input(s): [id number]  No results found for: DTRLVWCY67, FOLATE, FERRITIN, IRON, TOTIRONBC  Dx-chest-portable (1 View)    7/9/2017  7/9/2017 7:51 PM HISTORY/REASON FOR EXAM:  Chest Pain.  Neurosyncope TECHNIQUE/EXAM DESCRIPTION AND NUMBER OF VIEWS: Single portable view of the chest. COMPARISON: None FINDINGS: The lungs are clear.  The lungs are probably hyperinflated. Cardiac silhouette: normal in size. Pleura: There are no pleural effusion or pneumothoraces. Osseous structures: There are spinal degenerative changes. There is scoliosis.     7/9/2017  No acute cardiopulmonary abnormality identified.    Us-extremity Venous Unilateral-lower Left    6/30/2017 6/30/2017 3:47 PM HISTORY/REASON FOR EXAM:  Left lower extremity pain, pressure, and edema. TECHNIQUE/EXAM DESCRIPTION:  Left lower extremity venous ultrasound was performed COMPARISON:  None. FINDINGS:  Using both color and pulsed-wave Doppler imaging, multiple images were obtained from the common femoral vein origin distally through the popliteal trifurcation. Graded compression was used to demonstrate compressibility. There is no evidence of luminal thrombus. There is normal augmentation to flow and preserved respiratory variation.     6/30/2017  No left lower extremity deep venous thrombosis. Findings were discussed with ASHA ARANDA M.D. on 6/30/2017 4:10 PM.      ASSESSMENT/PLAN: Azul Milan is a 66 y.o. female who presents with multiple issues including syncopal episode 3 weeks ago, and reported weight loss, forgetfulness, and loss of appetite over the last year.    1. Syncope: Upon assessment in the ER, the patient does have EKG changes which show some ST depressions. We have no previous EKG to compare to see if this is new. The patient is asymptomatic. She will be admitted to the telemetry floor and monitored for any cardiac dysrhythmias. I will trend her troponin levels and obtain serial EKGs. I have ordered an echocardiogram and a cardiac stress test. She will be receiving IV fluid resuscitation overnight and we will check orthostatic blood pressures, a.m. cortisol, TSH, and lipid panel. She will be on every 4 neuro checks.    2.  Weight loss, memory loss, confusion all worsening over the last year: The patient does carry a family history of dementia in her family. However differentials may also include underlying malignancy or other psychiatric issue. Patient seems anxious but otherwise mood is stable. Family reports a recent colonoscopy which was unremarkable however I do not have the results to confirm. Her chest x-ray shows no masses. I will check a CT of the head. Patient denies any bowel changes. If workup in hospital is negative, then she will need further assessment by her primary care physician on an outpatient basis.    PPX: Sequential compression devices for DVT prophylaxis, no PPI indicated, stool softeners ordered    DISPO: Tele    CODE STATUS: FULL    Anticipate that patient will need less than 2 midnights for management of the above discussed medical issues.    This dictation was created using voice recognition software. The accuracy of the dictation is limited to the abilities of the software. I expect there may be some errors of grammar and possibly content.

## 2017-07-10 NOTE — ED NOTES
Med rec updated and complete.  Allergies reviewed.  Pt denies antibiotic use in last 30 days.  Pt takes no prescription medication.

## 2017-07-10 NOTE — PROGRESS NOTES
2 RN skin assessment with LIYA Ramey:  Pt has a rash on her back, which she is aware of. Heels are dry bilaterally with a small scab on the heel of the left foot. Otherwise, skin is intact.

## 2017-07-10 NOTE — CARE PLAN
Problem: Safety  Goal: Will remain free from injury  Patient educated on importance of using the call light if in need of assistance. Patient verbalizes understanding. Bed locked in lowest position, non skid socks on, personal belongings and call light within reach, appropriate sign placed on door.    Problem: Knowledge Deficit  Goal: Knowledge of disease process/condition, treatment plan, diagnostic tests, and medications will improve  Patient and family updated on POC. All questions answered at this time. Stress test scheduled for 1500 this afternoon.

## 2017-07-10 NOTE — PROGRESS NOTES
Pt arrived to room via gurney. Monitor placed and monitor room notified; pt able to ambulate from gurney to bed independently. POC discussed with pt and family (daughter, son, and ); all questions answered at this time. Pt is on room air. AOx4. No complaints of pain or SOB.

## 2017-07-10 NOTE — ED NOTES
Azul Milan 66 y.o. female   To triage from EKG    Chief Complaint   Patient presents with   • Near Syncopal     x2 in last two days.  Sat down prior to falling.  2 weeks ago, had syncopal, fell into .  Did not fall or hit head.   • Leg Swelling     Left.      To [facility] from triage.

## 2017-07-10 NOTE — PROGRESS NOTES
Tech from Lab called with critical result of potassium of 2.7 at 340 am. Critical lab result read back to .    Dr. Galicia notified of critical lab result at 350 am.  Critical lab result read back by Dr. Galicia. Dr. Galicia would like to order a lab for mag and an oral replacement for potassium.

## 2017-07-10 NOTE — PROGRESS NOTES
Received report from LIYA Condon. Pt is on the way to the floor via transport. Roseanna stated that pt received NS bolus in the ER but it is still showing as overdue in the MAR, she will address this before pt leaves her care.

## 2017-07-10 NOTE — ED PROVIDER NOTES
"ED Provider Note    Scribed for Maria Elena Branch M.D. by Godfrey Childress. 7/9/2017, 6:59 PM.    Primary care provider: Rachelle Isaac M.D.  Means of arrival: Walk In  History obtained from: Patient  History limited by: None    CHIEF COMPLAINT  Chief Complaint   Patient presents with   • Near Syncopal     x2 in last two days.  Sat down prior to falling.  2 weeks ago, had syncopal, fell into .  Did not fall or hit head.   • Leg Swelling     Left.       HPI  Azul Milan is a 66 y.o. female who presents to the Emergency Department for evaluation of multiple syncopal events over the past two weeks. Patient reports experiencing near syncopal event after a bike ride today. She states she was able to sit down prior to falling after \"not feeling normal\". Per patient, she drank plenty of water to stay hydrated after recently being in the heat. Per family, patient is very healthy but noticed her experiencing shortness of breath then passed out while being on a hike two weeks ago. Per patient, she denies any head trauma following sycopal event. Family notes seeing a decrease in energy and weight loss over the past two months. At this time, patient denies chest pain or shortness of breath. No recent cough, fevers, or chills.     REVIEW OF SYSTEMS  Pertinent positives include multiple syncopal events, weight loss, left leg swelling. Pertinent negatives include no head trauma, chest pain, shortness of breath, cough, fevers, or chills. All other systems reviewed and negative.     PAST MEDICAL HISTORY  No pertinent past medical history noted.     SURGICAL HISTORY   has past surgical history that includes tonsillectomy and tubal coagulation laparoscopic bilateral.    SOCIAL HISTORY  Social History   Substance Use Topics   • Smoking status: Never Smoker    • Smokeless tobacco: Never Used   • Alcohol Use: Yes      Comment: very occasionally      History   Drug Use No       FAMILY HISTORY  Family History   Problem " "Relation Age of Onset   • Dementia Mother    • Cancer Sister      breast   • Other Brother      wegeners grandulomatosis   • Lung Disease Paternal Grandfather      TB. Lung removal       CURRENT MEDICATIONS  No current facility-administered medications on file prior to encounter.     Current Outpatient Prescriptions on File Prior to Encounter   Medication Sig Dispense Refill   • Vitamin D, Cholecalciferol, 1000 UNITS Cap Take  by mouth.     • Omega-3 Fatty Acids (FISH OIL) 1000 MG Cap capsule Take 1,000 mg by mouth 3 times a day, with meals.     • aspirin EC (ECOTRIN) 81 MG Tablet Delayed Response Take 81 mg by mouth every day.     • CALCIUM & MAGNESIUM CARBONATES PO Take  by mouth.     • Zinc Sulfate (ZINC 15 PO) Take  by mouth.         ALLERGIES  Allergies   Allergen Reactions   • Penicillins Rash     Minor RASH       PHYSICAL EXAM  VITAL SIGNS: /72 mmHg  Pulse 95  Temp(Src) 37.6 °C (99.7 °F)  Resp 16  Ht 1.702 m (5' 7\")  Wt 54.4 kg (119 lb 14.9 oz)  BMI 18.78 kg/m2  SpO2 98%    Constitutional:  Well developed, No acute distress, Non-toxic appearance.    HENT: Normocephalic, Atraumatic, Bilateral external ears normal, Dry mucous membranes. No oral exudates, Nose normal.   Eyes: PERRL, EOMI, Conjunctiva normal, No discharge.   Neck: Normal range of motion, No tenderness, Supple, No stridor.   Lymphatic: No lymphadenopathy noted.   Cardiovascular: Normal heart rate, Normal rhythm,    Thorax & Lungs: Normal breath sounds, No respiratory distress, No wheezing, No chest tenderness.   Abdomen: Benign abdominal exam, no guarding no rebound, no masses, no pulsatile mass, no tenderness, no distention  Skin: Warm, Dry, No erythema, No rash.   Back: No tenderness, No CVA tenderness.   Extremities: Intact distal pulses, Mild edema to left lower extremity, No pedal edema, No cords, No calf tenderness   Neurologic: Alert & oriented x 3, Normal motor function, Normal sensory function, No focal deficits noted. "   Psychiatric: Appropriate                                                     DIAGNOSTIC STUDIES / PROCEDURES\    LABS  Results for orders placed or performed during the hospital encounter of 07/09/17   CBC w/ Differential   Result Value Ref Range    WBC 6.9 4.8 - 10.8 K/uL    RBC 3.90 (L) 4.20 - 5.40 M/uL    Hemoglobin 11.9 (L) 12.0 - 16.0 g/dL    Hematocrit 35.9 (L) 37.0 - 47.0 %    MCV 92.1 81.4 - 97.8 fL    MCH 30.5 27.0 - 33.0 pg    MCHC 33.1 (L) 33.6 - 35.0 g/dL    RDW 44.2 35.9 - 50.0 fL    Platelet Count 435 164 - 446 K/uL    MPV 9.6 9.0 - 12.9 fL    Neutrophils-Polys 57.00 44.00 - 72.00 %    Lymphocytes 30.20 22.00 - 41.00 %    Monocytes 10.60 0.00 - 13.40 %    Eosinophils 1.40 0.00 - 6.90 %    Basophils 0.40 0.00 - 1.80 %    Immature Granulocytes 0.40 0.00 - 0.90 %    Nucleated RBC 0.00 /100 WBC    Neutrophils (Absolute) 3.93 2.00 - 7.15 K/uL    Lymphs (Absolute) 2.09 1.00 - 4.80 K/uL    Monos (Absolute) 0.73 0.00 - 0.85 K/uL    Eos (Absolute) 0.10 0.00 - 0.51 K/uL    Baso (Absolute) 0.03 0.00 - 0.12 K/uL    Immature Granulocytes (abs) 0.03 0.00 - 0.11 K/uL    NRBC (Absolute) 0.00 K/uL   Complete Metabolic Panel (CMP)   Result Value Ref Range    Sodium 135 135 - 145 mmol/L    Potassium 2.8 (L) 3.6 - 5.5 mmol/L    Chloride 101 96 - 112 mmol/L    Co2 24 20 - 33 mmol/L    Anion Gap 10.0 0.0 - 11.9    Glucose 112 (H) 65 - 99 mg/dL    Bun 8 8 - 22 mg/dL    Creatinine 0.82 0.50 - 1.40 mg/dL    Calcium 8.2 (L) 8.5 - 10.5 mg/dL    AST(SGOT) 28 12 - 45 U/L    ALT(SGPT) 18 2 - 50 U/L    Alkaline Phosphatase 70 30 - 99 U/L    Total Bilirubin 0.4 0.1 - 1.5 mg/dL    Albumin 2.9 (L) 3.2 - 4.9 g/dL    Total Protein 5.7 (L) 6.0 - 8.2 g/dL    Globulin 2.8 1.9 - 3.5 g/dL    A-G Ratio 1.0 g/dL   Troponin STAT   Result Value Ref Range    Troponin I <0.01 0.00 - 0.04 ng/mL   D-Dimer (only helpful in low pre-test probability wells critieria. Do not order if patient ruled out by PERC criteria. See Weblinks at top of Labs  section)   Result Value Ref Range    D-Dimer Screen 204 <250 ng/mL(D-DU)   ESTIMATED GFR   Result Value Ref Range    GFR If African American >60 >60 mL/min/1.73 m 2    GFR If Non African American >60 >60 mL/min/1.73 m 2   EKG (ER)   Result Value Ref Range    Report       [facility] Emergency Dept.    Test Date:  2017  Pt Name:    KASH MAIN            Department: ER  MRN:        [id number]                      Room:  Gender:     F                            Technician: [id number]  :        [id number]                   Requested By:ER TRIAGE PROTOCOL  Order #:    [id number]                    Reading MD:    Measurements  Intervals                                Axis  Rate:       100                          P:          78  NH:         160                          QRS:        62  QRSD:       108                          T:          13  QT:         360  QTc:        465    Interpretive Statements  SINUS TACHYCARDIA  BORDERLINE INTRAVENTRICULAR CONDUCTION DELAY  RSR' IN V1 OR V2, RIGHT VCD OR RVH  BASELINE WANDER IN LEAD(S) I,II,aVR,V1,V2,V4,V6  No previous ECG available for comparison         All labs reviewed by me.    EKG  12 lead EKG interpreted by me. EKG read at 1851  Rhythm: Sinus tachycardia  Rate: 100  Axis: Normal  Ectopy: None  Conduction: Normal  ST Segments: ST depression in leads II, III, and aVF.   T Waves: No acute change  Q Waves: None  Clinical Impression: No ST elevation myocardial infarction.     RADIOLOGY  DX-CHEST-PORTABLE (1 VIEW)   Final Result      No acute cardiopulmonary abnormality identified.        The radiologist's interpretation of all radiological studies have been reviewed by me.    COURSE & MEDICAL DECISION MAKING  Nursing notes, VS, PMSFHx reviewed in chart.     6:59 PM Patient seen and examined at bedside. The patient presents with multiple syncopal episodes and left leg swelling. History of recent US showing no evidence of DVT. EKG shows ST depression in  the inferior leads. No current chest pain and patient is not hypoxic, doubt PE but will order D-dimer. The differential diagnosis includes but is not limited to dysrhythmia vs cardiac ideology. Ordered for chest x-ray, CBC with differential, CMP, troponin, D-dimer to evaluate. Patient was treated with 1,000 mL NS infusion, 324 mg Aspirin tablet for her symptoms.    Patient's laboratory studies show a low potassium of 2.8. Her albumin is also low. Her troponin is normal. She also has a normal d-dimer. Reports also indicate a previous ultrasound showed no evidence of DVT. Therefore it's unclear what is causing her leg swelling. I do not see any evidence of a cellulitis. With her EKG changes and her syncope I do feel she is at high risk and therefore needs admission for further evaluation. She also probably needs further evaluation of her weight loss and this difficulty breathing. Dr. Galicia has seen the patient and we discussed her plan of care. She    will CT the patient to further evaluate her symptoms. Patient is currently resting comfortably and understands the plan.    I discussed patient's case and findings noted above with Dr. Galicia, Hospitalist.       DISPOSITION:  Patient will be admitted to Dr. Galicia, Hospitalist, in stable condition.       FINAL IMPRESSION  1. Syncope, unspecified syncope type    2. Leg swelling          Godfrey CAMPOS (Scribjuan c), am scribing for, and in the presence of, Maria Elena Branch M.D..    Electronically signed by: Godfrey Childress (Lupe), 7/9/2017    Maria Elena CAMPOS M.D. personally performed the services described in this documentation, as scribed by Godfrey Childress in my presence, and it is both accurate and complete.    The note accurately reflects work and decisions made by me.  Maria Elena Branch  7/9/2017  9:15 PM

## 2017-07-11 VITALS
HEART RATE: 80 BPM | HEIGHT: 67 IN | DIASTOLIC BLOOD PRESSURE: 58 MMHG | BODY MASS INDEX: 19.13 KG/M2 | TEMPERATURE: 98.9 F | RESPIRATION RATE: 16 BRPM | OXYGEN SATURATION: 100 % | SYSTOLIC BLOOD PRESSURE: 119 MMHG | WEIGHT: 121.91 LBS

## 2017-07-11 LAB
FOLATE SERPL-MCNC: 18.9 NG/ML
LV EJECT FRACT MOD 2C 99903: 63.79
LV EJECT FRACT MOD 4C 99902: 64.48
LV EJECT FRACT MOD BP 99901: 64.75
POTASSIUM SERPL-SCNC: 3.8 MMOL/L (ref 3.6–5.5)

## 2017-07-11 PROCEDURE — 84132 ASSAY OF SERUM POTASSIUM: CPT

## 2017-07-11 PROCEDURE — 99217 PR OBSERVATION CARE DISCHARGE: CPT | Performed by: INTERNAL MEDICINE

## 2017-07-11 PROCEDURE — 82746 ASSAY OF FOLIC ACID SERUM: CPT

## 2017-07-11 PROCEDURE — 93306 TTE W/DOPPLER COMPLETE: CPT

## 2017-07-11 PROCEDURE — A9270 NON-COVERED ITEM OR SERVICE: HCPCS | Performed by: HOSPITALIST

## 2017-07-11 PROCEDURE — 700102 HCHG RX REV CODE 250 W/ 637 OVERRIDE(OP): Performed by: HOSPITALIST

## 2017-07-11 PROCEDURE — G0378 HOSPITAL OBSERVATION PER HR: HCPCS

## 2017-07-11 PROCEDURE — 36415 COLL VENOUS BLD VENIPUNCTURE: CPT

## 2017-07-11 PROCEDURE — 93306 TTE W/DOPPLER COMPLETE: CPT | Mod: 26 | Performed by: INTERNAL MEDICINE

## 2017-07-11 RX ORDER — FERROUS SULFATE 325(65) MG
325 TABLET ORAL DAILY
Qty: 30 TAB | Refills: 0 | Status: SHIPPED | OUTPATIENT
Start: 2017-07-11 | End: 2018-07-31

## 2017-07-11 RX ADMIN — THERA TABS 1 TABLET: TAB at 08:32

## 2017-07-11 RX ADMIN — Medication 325 MG: at 08:32

## 2017-07-11 RX ADMIN — Medication 325 MG: at 11:00

## 2017-07-11 RX ADMIN — ASPIRIN 81 MG: 81 TABLET ORAL at 08:32

## 2017-07-11 ASSESSMENT — PAIN SCALES - GENERAL
PAINLEVEL_OUTOF10: 0

## 2017-07-11 ASSESSMENT — LIFESTYLE VARIABLES: EVER_SMOKED: NEVER

## 2017-07-11 NOTE — CARE PLAN
Problem: Safety  Goal: Will remain free from falls  Outcome: PROGRESSING AS EXPECTED  Fall interventions in place. Bed in lowest position, bed alarm on, appropriate sign placed, staff educated on mobility and risk for falls.     Problem: Venous Thromboembolism (VTW)/Deep Vein Thrombosis (DVT) Prevention:  Goal: Patient will participate in Venous Thrombosis (VTE)/Deep Vein Thrombosis (DVT)Prevention Measures  Outcome: PROGRESSING AS EXPECTED  Pt wearing SCD's.

## 2017-07-11 NOTE — PROGRESS NOTES
[facility] Hospitalist Progress Note    Date of Service: 7/10/2017    Chief Complaint  66 y.o. female admitted 2017 with syncope, anemia and memory loss    Interval Problem Update  Low potassium 2.9    Anemia hb 10    Hx poor nutrition    Ct head negative    Consultants/Specialty  none    Dispositionhome when stable        Review of Systems   Constitutional: Negative for fever and chills.   HENT: Negative for hearing loss.    Eyes: Negative for blurred vision and pain.   Respiratory: Negative for cough, hemoptysis and sputum production.    Cardiovascular: Negative for chest pain.   Gastrointestinal: Negative for heartburn and nausea.   Genitourinary: Negative for frequency.   Musculoskeletal: Negative for myalgias and back pain.   Neurological: Negative for dizziness, tingling, tremors, speech change and headaches.   Psychiatric/Behavioral: Positive for memory loss.   All other systems reviewed and are negative.     Physical Exam  Laboratory/Imaging   Hemodynamics  Temp (24hrs), Av °C (98.6 °F), Min:36.6 °C (97.9 °F), Max:37.6 °C (99.7 °F)   Temperature: 36.8 °C (98.2 °F)  Pulse  Av.8  Min: 77  Max: 95 Heart Rate (Monitored): 82  Blood Pressure : 105/61 mmHg, NIBP: 109/61 mmHg      Respiratory      Respiration: 16, Pulse Oximetry: 96 %             Fluids  No intake or output data in the 24 hours ending 07/10/17 1825    Nutrition  Orders Placed This Encounter   Procedures   • Diet Order     Standing Status: Standing      Number of Occurrences: 1      Standing Expiration Date:      Order Specific Question:  Diet:     Answer:  Regular [1]     Physical Exam   Constitutional: She is oriented to person, place, and time. No distress.   HENT:   Head: Normocephalic and atraumatic.   Mouth/Throat: No oropharyngeal exudate.   Eyes: Left eye exhibits no discharge.   Neck: No JVD present. No tracheal deviation present. No thyromegaly present.   Cardiovascular: Normal rate.  Exam reveals no gallop and no friction rub.     No murmur heard.  Pulmonary/Chest: No respiratory distress.   Abdominal: She exhibits no distension. There is no tenderness. There is no rebound.   Musculoskeletal: She exhibits no edema or tenderness.   Neurological: She is alert and oriented to person, place, and time. No cranial nerve deficit. Coordination normal.   Skin: She is not diaphoretic.       Recent Labs      07/09/17   1910  07/10/17   0255   WBC  6.9  6.1   RBC  3.90*  3.34*   HEMOGLOBIN  11.9*  10.2*   HEMATOCRIT  35.9*  30.5*   MCV  92.1  91.3   MCH  30.5  30.5   MCHC  33.1*  33.4*   RDW  44.2  44.0   PLATELETCT  435  344   MPV  9.6  9.0     Recent Labs      07/09/17   1910  07/10/17   0255  07/10/17   1642   SODIUM  135  139   --    POTASSIUM  2.8*  2.7*  2.9*   CHLORIDE  101  108   --    CO2  24  25   --    GLUCOSE  112*  99   --    BUN  8  7*   --    CREATININE  0.82  0.60   --    CALCIUM  8.2*  7.2*   --              Recent Labs      07/10/17   0255   TRIGLYCERIDE  55   HDL  46   LDL  68          Assessment/Plan     Syncope  Assessment & Plan  Check mri brain    Check mra head and neck    pthall wnl    Echo pending    eeg wnl    Normal d dimer    Hypokalemia  Assessment & Plan  Replace po and iv  Magnesium wnl    Memory loss (present on admission)  Assessment & Plan  ??dementia    Ct head shows atropy    tsh wnl    Check rpr    Anemia  Assessment & Plan  Check iron, ferritin, vit b12, folate     Check stool for occult blood        Rosario catheter: No Rosario      DVT Prophylaxis: Not indicated at this time, ambulatory

## 2017-07-11 NOTE — ASSESSMENT & PLAN NOTE
Check mri brain    Check mra head and neck    pthall wnl    Echo pending    eeg wnl    Normal d dimer

## 2017-07-11 NOTE — DISCHARGE INSTRUCTIONS
Discharge Instructions    Discharged to home by car with relative. Discharged via walking, hospital escort: Refused.  Special equipment needed: Not Applicable    Be sure to schedule a follow-up appointment with your primary care doctor or any specialists as instructed.     Discharge Plan:   Diet Plan: Discussed  Activity Level: Discussed  Confirmed Follow up Appointment: Patient to Call and Schedule Appointment  Confirmed Symptoms Management: Discussed  Medication Reconciliation Updated: Yes  Influenza Vaccine Indication: Not indicated: Previously immunized this influenza season and > 8 years of age    I understand that a diet low in cholesterol, fat, and sodium is recommended for good health. Unless I have been given specific instructions below for another diet, I accept this instruction as my diet prescription.   Other diet: Healthy diet    Special Instructions: None    · Is patient discharged on Warfarin / Coumadin?   No     · Is patient Post Blood Transfusion?  No    Depression / Suicide Risk    As you are discharged from this [facility], it is important to learn how to keep safe from harming yourself.    Recognize the warning signs:  · Abrupt changes in personality, positive or negative- including increase in energy   · Giving away possessions  · Change in eating patterns- significant weight changes-  positive or negative  · Change in sleeping patterns- unable to sleep or sleeping all the time   · Unwillingness or inability to communicate  · Depression  · Unusual sadness, discouragement and loneliness  · Talk of wanting to die  · Neglect of personal appearance   · Rebelliousness- reckless behavior  · Withdrawal from people/activities they love  · Confusion- inability to concentrate     If you or a loved one observes any of these behaviors or has concerns about self-harm, here's what you can do:  · Talk about it- your feelings and reasons for harming yourself  · Remove any means that you might use to  hurt yourself (examples: pills, rope, extension cords, firearm)  · Get professional help from the community (Mental Health, Substance Abuse, psychological counseling)  · Do not be alone:Call your Safe Contact- someone whom you trust who will be there for you.  · Call your local CRISIS HOTLINE 158-6297 or 465-450-7340  · Call your local Children's Mobile Crisis Response Team [place] (952) 954-7003 or www.Beauty[employer]  · Call the toll free National Suicide Prevention Hotlines   · National Suicide Prevention Lifeline 579-630-BPHT (7382)  · [employer] Hope Line Network 800-SUICIDE (786-3590)    Syncope  Syncope is a medical term for fainting or passing out. This means you lose consciousness and drop to the ground. People are generally unconscious for less than 5 minutes. You may have some muscle twitches for up to 15 seconds before waking up and returning to normal. Syncope occurs more often in older adults, but it can happen to anyone. While most causes of syncope are not dangerous, syncope can be a sign of a serious medical problem. It is important to seek medical care.   CAUSES   Syncope is caused by a sudden drop in blood flow to the brain. The specific cause is often not determined. Factors that can bring on syncope include:  · Taking medicines that lower blood pressure.  · Sudden changes in posture, such as standing up quickly.  · Taking more medicine than prescribed.  · Standing in one place for too long.  · Seizure disorders.  · Dehydration and excessive exposure to heat.  · Low blood sugar (hypoglycemia).  · Straining to have a bowel movement.  · Heart disease, irregular heartbeat, or other circulatory problems.  · Fear, emotional distress, seeing blood, or severe pain.  SYMPTOMS   Right before fainting, you may:  · Feel dizzy or light-headed.  · Feel nauseous.  · See all white or all black in your field of vision.  · Have cold, clammy skin.  DIAGNOSIS   Your health care provider will ask about your  symptoms, perform a physical exam, and perform an electrocardiogram (ECG) to record the electrical activity of your heart. Your health care provider may also perform other heart or blood tests to determine the cause of your syncope which may include:  · Transthoracic echocardiogram (TTE). During echocardiography, sound waves are used to evaluate how blood flows through your heart.  · Transesophageal echocardiogram (NAHID).  · Cardiac monitoring. This allows your health care provider to monitor your heart rate and rhythm in real time.  · Holter monitor. This is a portable device that records your heartbeat and can help diagnose heart arrhythmias. It allows your health care provider to track your heart activity for several days, if needed.  · Stress tests by exercise or by giving medicine that makes the heart beat faster.  TREATMENT   In most cases, no treatment is needed. Depending on the cause of your syncope, your health care provider may recommend changing or stopping some of your medicines.  HOME CARE INSTRUCTIONS  · Have someone stay with you until you feel stable.  · Do not drive, use machinery, or play sports until your health care provider says it is okay.  · Keep all follow-up appointments as directed by your health care provider.  · Lie down right away if you start feeling like you might faint. Breathe deeply and steadily. Wait until all the symptoms have passed.  · Drink enough fluids to keep your urine clear or pale yellow.  · If you are taking blood pressure or heart medicine, get up slowly and take several minutes to sit and then stand. This can reduce dizziness.  SEEK IMMEDIATE MEDICAL CARE IF:   · You have a severe headache.  · You have unusual pain in the chest, abdomen, or back.  · You are bleeding from your mouth or rectum, or you have black or tarry stool.  · You have an irregular or very fast heartbeat.  · You have pain with breathing.  · You have repeated fainting or seizure-like jerking during an  episode.  · You faint when sitting or lying down.  · You have confusion.  · You have trouble walking.  · You have severe weakness.  · You have vision problems.  If you fainted, call your local emergency services (911 in [place] Do not drive yourself to the hospital.      This information is not intended to replace advice given to you by your health care provider. Make sure you discuss any questions you have with your health care provider.     Document Released: 12/18/2006 Document Revised: 05/03/2016 Document Reviewed: 02/15/2013  [employer] Interactive Patient Education ©2016 Elsevier Inc.    Iron tablets, capsules, extended-release tablets  What is this medicine?  IRON (AHY aureliano) replaces iron that is essential to healthy red blood cells. Iron is used to treat iron deficiency anemia. Anemia may cause problems like tiredness, shortness of breath, or slowed growth in children. Only take iron if your doctor has told you to. Do not treat yourself with iron if you are feeling tired. Most healthy people get enough iron in their diets, particularly if they eat cereals, meat, poultry, and fish.  This medicine may be used for other purposes; ask your health care provider or pharmacist if you have questions.  COMMON BRAND NAME(S): Bifera, Duofer, Feosol Complete, Feosol Natural Release, Feosol, Feratab, Ferate , Fergon, Fero-Grad 500, Ferretts, Ferrimin , Hernesto-Sequels, Hemocyte, Nephro-Amos, NovaFerrum, ProFe, Slow Fe, Slow Iron , Tandem  What should I tell my health care provider before I take this medicine?  They need to know if you have any of these conditions:  -frequently drink alcohol  -bowel disease  -hemolytic anemia  -iron overload (hemochromatosis, hemosiderosis)  -liver disease  -problems with swallowing  -stomach ulcer or other stomach problems  -an unusual or allergic reaction to iron, other medicines, foods, dyes, or preservatives  -pregnant or trying to get pregnant  -breast-feeding  How should I use this  medicine?  Take this medicine by mouth with a glass of water or fruit juice. Follow the directions on the prescription label. Swallow whole. Do not crush or chew. Take this medicine in an upright or sitting position. Try to take any bedtime doses at least 10 minutes before lying down. You may take this medicine with food. Take your medicine at regular intervals. Do not take your medicine more often than directed. Do not stop taking except on your doctor's advice.  Talk to your pediatrician regarding the use of this medicine in children. While this drug may be prescribed for selected conditions, precautions do apply.  Overdosage: If you think you have taken too much of this medicine contact a poison control center or emergency room at once.  NOTE: This medicine is only for you. Do not share this medicine with others.  What if I miss a dose?  If you miss a dose, take it as soon as you can. If it is almost time for your next dose, take only that dose. Do not take double or extra doses.  What may interact with this medicine?  If you are taking this iron product, you should not take iron in any other medicine or dietary supplement.  This medicine may also interact with the following medications:  -alendronate  -antacids  -cefdinir  -chloramphenicol  -cholestyramine  -deferoxamine  -dimercaprol  -etidronate  -medicines for stomach ulcers or other stomach problems  -pancreatic enzymes  -quinolone antibiotics (examples: Cipro, Floxin, Levaquin, Tequin and others)  -risedronate  -tetracycline antibiotics (examples: doxycycline, tetracycline, minocycline, and others)  -thyroid hormones  This list may not describe all possible interactions. Give your health care provider a list of all the medicines, herbs, non-prescription drugs, or dietary supplements you use. Also tell them if you smoke, drink alcohol, or use illegal drugs. Some items may interact with your medicine.  What should I watch for while using this medicine?  Use  iron supplements only as directed by your health care professional. You will need important blood work while you are taking this medicine. It may take 3 to 6 months of therapy to treat low iron levels. Pregnant women should follow the dose and length of iron treatment as directed by their doctors.  Do not use iron longer than prescribed, and do not take a higher dose than recommended. Long-term use may cause excess iron to build-up in the body.  Do not take iron with antacids. If you need to take an antacid, take it 2 hours after a dose of iron.  What side effects may I notice from receiving this medicine?  Side effects that you should report to your doctor or health care professional as soon as possible:  -allergic reactions like skin rash, itching or hives, swelling of the face, lips, or tongue  -blue lips, nails, or palms  -dark colored stools (this may be due to the iron, but can indicate a more serious condition)  -drowsiness  -pain with or difficulty swallowing  -pale or clammy skin  -seizures  -stomach pain  -unusually weak or tired  -vomiting  -weak, fast, or irregular heartbeat  Side effects that usually do not require medical attention (report to your doctor or health care professional if they continue or are bothersome):  -constipation  -indigestion  -nausea or stomach upset  This list may not describe all possible side effects. Call your doctor for medical advice about side effects. You may report side effects to FDA at 6-856-FDA-8755.  Where should I keep my medicine?  Keep out of the reach of children. Even small amounts of iron can be harmful to a child.  Store at room temperature between 15 and 30 degrees C (59 and 86 degrees F). Keep container tightly closed. Throw away any unused medicine after the expiration date.  NOTE: This sheet is a summary. It may not cover all possible information. If you have questions about this medicine, talk to your doctor, pharmacist, or health care provider.  © 2014,  Elsevier/Gold Standard. (5/5/2009 5:03:41 PM)

## 2017-07-11 NOTE — DISCHARGE SUMMARY
CHIEF COMPLAINT ON ADMISSION  Chief Complaint   Patient presents with   • Near Syncopal     x2 in last two days.  Sat down prior to falling.  2 weeks ago, had syncopal, fell into .  Did not fall or hit head.   • Leg Swelling     Left.     Echo:  No prior echo for comparison.  Left ventricular ejection fraction is visually estimated to be 65%.   Normal diastolic function.  Trace mitral regurgitation.  Estimated right ventricular systolic pressure is  20 mmHg.  No pericardial effusion seen.   No prior study is available for comparison.   CODE STATUS  Full Code    HPI & HOSPITAL COURSE  This is a 66 y.o. female here with syncope episode and increase forgetfullness. She had CT head, MRI, MRA head and neck done without any significant finding. She has been feeling well. Explained to her about possible cause of her syncope. Echo: was done and benign. Educated her and her family member that if this happens again, she will need to go see her PCP and possible holter monitor or cardiology referral for LOOP recorder for arrhythmia related syncope. Her hypokalemia resolved. Found to have iron deficiency anemia:  Discharged iron supplement.    Therefore, she is discharged in stable and stable condition with close outpatient follow-up.      DISCHARGE PROBLEM LIST  Active Problems:    Syncope POA: Unknown    Hypokalemia POA: Unknown    Memory loss POA: Yes    Anemia POA: Unknown  Resolved Problems:    * No resolved hospital problems. *      FOLLOW UP  No future appointments.  Rachelle Isaac M.D.  [address] [facility]  [address] [place] [id number]  [phone]    In 1 week        MEDICATIONS ON DISCHARGE   Azul Milan   Home Medication Instructions KRIS:[id number]    Printed on:07/11/17 1332   Medication Information                      aspirin EC (ECOTRIN) 81 MG Tablet Delayed Response  Take 81 mg by mouth every day.             calcium citrate (CALCITRATE) 950 MG Tab  Take 950 mg by mouth every day.              ferrous sulfate 325 (65 FE) MG tablet  Take 1 Tab by mouth every day.             Omega-3 Fatty Acids (FISH OIL) 1000 MG Cap capsule  Take 1,000 mg by mouth every day.             Vitamin D, Cholecalciferol, 1000 UNITS Cap  Take 1,000 Units by mouth every day.             Zinc 50 MG Tab  Take 50 mg by mouth every day.                 DIET  Orders Placed This Encounter   Procedures   • Diet Order     Standing Status: Standing      Number of Occurrences: 1      Standing Expiration Date:      Order Specific Question:  Diet:     Answer:  Regular [1]       ACTIVITY  As tolerated.  Weight bearing as tolerated      CONSULTATIONS      PROCEDURES      LABORATORY  Lab Results   Component Value Date/Time    SODIUM 139 07/10/2017 02:55 AM    POTASSIUM 3.8 07/11/2017 03:31 AM    CHLORIDE 108 07/10/2017 02:55 AM    CO2 25 07/10/2017 02:55 AM    GLUCOSE 99 07/10/2017 02:55 AM    BUN 7* 07/10/2017 02:55 AM    CREATININE 0.60 07/10/2017 02:55 AM        Lab Results   Component Value Date/Time    WBC 6.1 07/10/2017 02:55 AM    HEMOGLOBIN 10.2* 07/10/2017 02:55 AM    HEMATOCRIT 30.5* 07/10/2017 02:55 AM    PLATELET COUNT 344 07/10/2017 02:55 AM        Total time of the discharge process exceeds 36 minutes

## 2017-07-12 ENCOUNTER — PATIENT OUTREACH (OUTPATIENT)
Dept: HEALTH INFORMATION MANAGEMENT | Facility: OTHER | Age: 67
End: 2017-07-12

## 2017-07-12 NOTE — PROGRESS NOTES
Confirmed with Dr. Gonzales patient okay to discharge. All lines and monitor discontinued. Discharge instructions and medications discussed with patient, all questions answered, patient verbalizes understanding. Follow up appointments to be scheduled by patient. Pneumonia and influenza vaccines not indicated. Discharge instructions, prescriptions, and personal belongings with patient. Patient down to car walking with , off unit without incident. Tele monitor signed in and returned to [facility].

## 2017-07-12 NOTE — PROGRESS NOTES
Attempt #:1    WebIZ Checked & Epic Updated: yes  HealthConnect Verified: no  Verify PCP: yes    Communication Preference Obtained: yes     Review Care Team: yes    Annual Wellness Visit Scheduling  1. Scheduling Status:Scheduled        Care Gap Scheduling (Attempt to Schedule EACH Overdue Care Gap!)     Health Maintenance Due   Topic Date Due   • Annual Wellness Visit  SCHEDULED   • IMM DTaP/Tdap/Td Vaccine (1 - Tdap) SCHEDULED   • COLONOSCOPY  PT UNSURE IF COMPLETED   • BONE DENSITY  PT UNSURE IF COMPLETED THROUGH [place]         [employer] Activation: already active  [place] Lynne: no  Virtual Visits: no  Opt In to Text Messages: no

## 2017-08-03 ENCOUNTER — TELEPHONE (OUTPATIENT)
Dept: MEDICAL GROUP | Facility: PHYSICIAN GROUP | Age: 67
End: 2017-08-03

## 2017-08-03 NOTE — TELEPHONE ENCOUNTER
ESTABLISHED PATIENT PRE-VISIT PLANNING     Note: Patient will not be contacted if there is no indication to call.     1.  Reviewed notes from the last few office visits within the medical group: Yes    2.  If any orders were placed at last visit or intended to be done for this visit (i.e. 6 mos follow-up), do we have Results/Consult Notes?        •  Labs - Labs ordered, completed and results are in chart.       •  Imaging - Imaging ordered, completed and results are in chart.       •  Referrals - No referrals were ordered at last office visit.    3. Is this appointment scheduled as a Hospital Follow-Up? No    4.  Immunizations were updated in Epic using WebIZ?: Epic matches WebIZ       •  Web Iz Recommendations: FLU and TDAP    5.  Patient is due for the following Health Maintenance Topics:   Health Maintenance Due   Topic Date Due   • Annual Wellness Visit  1950   • IMM DTaP/Tdap/Td Vaccine (1 - Tdap) 07/25/1968   • COLONOSCOPY  07/25/1999   • BONE DENSITY  07/25/2014           6.  Patient was informed to arrive 15 min prior to their scheduled appointment and bring in their medication bottles.

## 2017-08-04 ENCOUNTER — OFFICE VISIT (OUTPATIENT)
Dept: MEDICAL GROUP | Facility: PHYSICIAN GROUP | Age: 67
End: 2017-08-04
Payer: MEDICARE

## 2017-08-04 VITALS
BODY MASS INDEX: 18.64 KG/M2 | TEMPERATURE: 98.2 F | HEART RATE: 90 BPM | RESPIRATION RATE: 14 BRPM | OXYGEN SATURATION: 97 % | SYSTOLIC BLOOD PRESSURE: 110 MMHG | DIASTOLIC BLOOD PRESSURE: 72 MMHG | HEIGHT: 66 IN | WEIGHT: 116 LBS

## 2017-08-04 DIAGNOSIS — E87.6 HYPOKALEMIA: ICD-10-CM

## 2017-08-04 DIAGNOSIS — R55 SYNCOPE, UNSPECIFIED SYNCOPE TYPE: ICD-10-CM

## 2017-08-04 DIAGNOSIS — D50.9 IRON DEFICIENCY ANEMIA, UNSPECIFIED IRON DEFICIENCY ANEMIA TYPE: ICD-10-CM

## 2017-08-04 PROCEDURE — 99214 OFFICE O/P EST MOD 30 MIN: CPT | Performed by: FAMILY MEDICINE

## 2017-08-04 NOTE — MR AVS SNAPSHOT
"        Azul Tapia Kayli   2017 9:00 AM   Office Visit   MRN: [id number]    Department:  [facility] [place] [facility]   Dept Phone:  [phone]    Description:  Female : 1950   Provider:  Rachelle Isaac M.D.           Reason for Visit     Hospital Follow-up           Allergies as of 2017     Allergen Noted Reactions    Penicillins 2016   Rash    Minor RASH      You were diagnosed with     Iron deficiency anemia, unspecified iron deficiency anemia type   [2517332]       Hypokalemia   [[id number]]         Vital Signs     Blood Pressure Pulse Temperature Respirations Height Weight    110/72 mmHg 90 36.8 °C (98.2 °F) 14 1.689 m (5' 6.5\") 52.617 kg (116 lb)    Body Mass Index Oxygen Saturation Smoking Status             18.44 kg/m2 97% Never Smoker          Basic Information     Date Of Birth Sex Race Ethnicity Preferred Language    1950 Female White Non- English      Your appointments     Sep 06, 2017  9:20 AM   Established Patient with Rachelle Isaac M.D.   [address] [address]   [phone]           You will be receiving a confirmation call a few days before your appointment from our automated call confirmation system.              Problem List              ICD-10-CM Priority Class Noted - Resolved    Visit for screening mammogram Z12.31   2016 - Present    Preventative health care Z00.00   2016 - Present    Screening for colon cancer Z12.11   2016 - Present    Abnormal bone density screening R93.7   2016 - Present    Syncope R55   2017 - Present    Hypokalemia E87.6   7/10/2017 - Present    Memory loss R41.3   7/10/2017 - Present    Anemia D64.9   7/10/2017 - Present      Health Maintenance        Date Due Completion Dates    IMM DTaP/Tdap/Td Vaccine (1 - Tdap) 1968 ---    COLONOSCOPY 1999 ---    BONE DENSITY 2014 ---    IMM INFLUENZA (1) 2017    IMM PNEUMOCOCCAL 65+ " (ADULT) LOW/MEDIUM RISK SERIES (2 of 2 - PPSV23) 9/17/2017 9/17/2016    MAMMOGRAM 4/25/2018 4/25/2017, 4/29/2016, 4/28/2015    PAP SMEAR 11/28/2019 11/28/2016, 10/21/2014            Current Immunizations     13-VALENT PCV PREVNAR 9/17/2016,  Deferred (Patient Schedule)    Influenza Vaccine Adult HD 9/9/2016    SHINGLES VACCINE 9/17/2016      Below and/or attached are the medications your provider expects you to take. Review all of your home medications and newly ordered medications with your provider and/or pharmacist. Follow medication instructions as directed by your provider and/or pharmacist. Please keep your medication list with you and share with your provider. Update the information when medications are discontinued, doses are changed, or new medications (including over-the-counter products) are added; and carry medication information at all times in the event of emergency situations     Allergies:  PENICILLINS - Rash               Medications  Valid as of: August 04, 2017 -  9:27 AM    Generic Name Brand Name Tablet Size Instructions for use    Aspirin (Tablet Delayed Response) ECOTRIN 81 MG Take 81 mg by mouth every day.        Calcium Citrate (Tab) CALCITRATE 950 MG Take 950 mg by mouth every day.        Cholecalciferol (Cap) Vitamin D (Cholecalciferol) 1000 UNITS Take 1,000 Units by mouth every day.        Ferrous Sulfate (Tab) ferrous sulfate 325 (65 FE) MG Take 1 Tab by mouth every day.        Omega-3 Fatty Acids (Cap) fish oil 1000 MG Take 1,000 mg by mouth every day.        Potassium   Take  by mouth.        Zinc (Tab) Zinc 50 MG Take 50 mg by mouth every day.        .                 Medicines prescribed today were sent to:     [id number] - [place] - [address]    [address] [address]    Phone: [phone] Fax: [phone]    Open 24 Hours?: No      Medication refill instructions:       If your prescription bottle indicates you have medication refills left, it is not necessary to  call your provider’s office. Please contact your pharmacy and they will refill your medication.    If your prescription bottle indicates you do not have any refills left, you may request refills at any time through one of the following ways: The online [employer] system (except Urgent Care), by calling your provider’s office, or by asking your pharmacy to contact your provider’s office with a refill request. Medication refills are processed only during regular business hours and may not be available until the next business day. Your provider may request additional information or to have a follow-up visit with you prior to refilling your medication.   *Please Note: Medication refills are assigned a new Rx number when refilled electronically. Your pharmacy may indicate that no refills were authorized even though a new prescription for the same medication is available at the pharmacy. Please request the medicine by name with the pharmacy before contacting your provider for a refill.        Your To Do List     Future Labs/Procedures Complete By Expires    CBC WITH DIFFERENTIAL  As directed 8/4/2018    COMP METABOLIC PANEL  As directed 8/5/2018    FERRITIN  As directed 8/4/2018    IRON/TOTAL IRON BIND  As directed 8/4/2018    MAGNESIUM  As directed 8/4/2018         Car in the [employer] Access Code: Activation code not generated  Current [employer] Status: Active

## 2017-08-04 NOTE — PROGRESS NOTES
Chief Complaint   Patient presents with   • Hospital Follow-up       HISTORY OF PRESENT ILLNESS: Patient is a 67 y.o. female established patient here today for the following concerns:    1. Iron deficiency anemia, unspecified iron deficiency anemia type  2. Hypokalemia  3. Syncope, unspecified syncope type  Here for Hospital follow up.  She has had a few episodes of syncope.  Found to be anemic and critically low on potassium.  Calderon reports that there had been no changes in diet (which she boasts is exceedingly healthy), no new supplements or medications, denies any abdominal pains, diarrhea, melena or hematochezia.  No emesis.  She denies any purging or use of laxatives.  She has continued to lose weight. energy levels have been fine since replacement of potassium and starting iron.        Past Medical, Social, and Family history reviewed and updated in EPIC    Allergies:Penicillins    Current Outpatient Prescriptions   Medication Sig Dispense Refill   • POTASSIUM PO Take  by mouth.     • ferrous sulfate 325 (65 FE) MG tablet Take 1 Tab by mouth every day. 30 Tab 0   • Omega-3 Fatty Acids (FISH OIL) 1000 MG Cap capsule Take 1,000 mg by mouth every day.     • Zinc 50 MG Tab Take 50 mg by mouth every day.     • aspirin EC (ECOTRIN) 81 MG Tablet Delayed Response Take 81 mg by mouth every day.     • calcium citrate (CALCITRATE) 950 MG Tab Take 950 mg by mouth every day.     • Vitamin D, Cholecalciferol, 1000 UNITS Cap Take 1,000 Units by mouth every day.       No current facility-administered medications for this visit.         ROS:  Review of Systems   Constitutional: Negative for fever, chills, weight loss and malaise/fatigue.   HENT: Negative for ear pain, nosebleeds, congestion, sore throat and neck pain.    Eyes: Negative for blurred vision.   Respiratory: Negative for cough, sputum production, shortness of breath and wheezing.    Cardiovascular: Negative for chest pain, palpitations,  and leg swelling.  "  Gastrointestinal: Negative for heartburn, nausea, vomiting, diarrhea and abdominal pain.   Genitourinary: Negative for dysuria, urgency and frequency.   Musculoskeletal: Negative for myalgias, back pain and joint pain.   Skin: Negative for rash and itching.   Neurological: Negative for dizziness, tingling, tremors, sensory change, focal weakness and headaches.   Endo/Heme/Allergies: Does not bruise/bleed easily.   Psychiatric/Behavioral: Negative for depression, anxiety, suicidal ideas, insomnia and memory loss.      Exam:  Blood pressure 110/72, pulse 90, temperature 36.8 °C (98.2 °F), resp. rate 14, height 1.689 m (5' 6.5\"), weight 52.617 kg (116 lb), SpO2 97 %.    General:  Well nourished, well developed in NAD  Head is grossly normal.  Neck: Supple without JVD   Pulmonary:  Normal effort.   Cardiovascular: Regular rate  Extremities: no clubbing, cyanosis, or edema.  Psych: affect appropriate      Please note that this dictation was created using voice recognition software. I have made every reasonable attempt to correct obvious errors, but I expect that there are errors of grammar and possibly content that I did not discover before finalizing the note.    Assessment/Plan:  1. Iron deficiency anemia, unspecified iron deficiency anemia type  Unclear etiology.  Check   - OCCULT BLOOD, FECAL IMMUNOASSAY  - IRON/TOTAL IRON BIND; Future  - FERRITIN; Future  - CBC WITH DIFFERENTIAL; Future    2. Hypokalemia  Recheck levels, continue on potassium supplementation   - COMP METABOLIC PANEL; Future  - MAGNESIUM; Future    3. Syncope, unspecified syncope type  Resolved.  suspect 2/2 to hypokalemia.  No recurrence since being repleted.     1 month follow up          "

## 2017-08-09 ENCOUNTER — HOSPITAL ENCOUNTER (OUTPATIENT)
Dept: LAB | Facility: MEDICAL CENTER | Age: 67
End: 2017-08-09
Attending: FAMILY MEDICINE
Payer: MEDICARE

## 2017-08-09 ENCOUNTER — HOSPITAL ENCOUNTER (OUTPATIENT)
Facility: MEDICAL CENTER | Age: 67
End: 2017-08-09
Attending: FAMILY MEDICINE
Payer: MEDICARE

## 2017-08-09 DIAGNOSIS — D50.9 IRON DEFICIENCY ANEMIA, UNSPECIFIED IRON DEFICIENCY ANEMIA TYPE: ICD-10-CM

## 2017-08-09 DIAGNOSIS — E87.6 HYPOKALEMIA: ICD-10-CM

## 2017-08-09 LAB
ALBUMIN SERPL BCP-MCNC: 3.2 G/DL (ref 3.2–4.9)
ALBUMIN/GLOB SERPL: 1.2 G/DL
ALP SERPL-CCNC: 73 U/L (ref 30–99)
ALT SERPL-CCNC: 15 U/L (ref 2–50)
ANION GAP SERPL CALC-SCNC: 7 MMOL/L (ref 0–11.9)
AST SERPL-CCNC: 19 U/L (ref 12–45)
BASOPHILS # BLD AUTO: 0.6 % (ref 0–1.8)
BASOPHILS # BLD: 0.04 K/UL (ref 0–0.12)
BILIRUB SERPL-MCNC: 0.4 MG/DL (ref 0.1–1.5)
BUN SERPL-MCNC: 10 MG/DL (ref 8–22)
CALCIUM SERPL-MCNC: 8.3 MG/DL (ref 8.5–10.5)
CHLORIDE SERPL-SCNC: 105 MMOL/L (ref 96–112)
CO2 SERPL-SCNC: 26 MMOL/L (ref 20–33)
CREAT SERPL-MCNC: 0.6 MG/DL (ref 0.5–1.4)
EOSINOPHIL # BLD AUTO: 0.24 K/UL (ref 0–0.51)
EOSINOPHIL NFR BLD: 3.4 % (ref 0–6.9)
ERYTHROCYTE [DISTWIDTH] IN BLOOD BY AUTOMATED COUNT: 49.4 FL (ref 35.9–50)
FERRITIN SERPL-MCNC: 24.6 NG/ML (ref 10–291)
GFR SERPL CREATININE-BSD FRML MDRD: >60 ML/MIN/1.73 M 2
GLOBULIN SER CALC-MCNC: 2.6 G/DL (ref 1.9–3.5)
GLUCOSE SERPL-MCNC: 84 MG/DL (ref 65–99)
HCT VFR BLD AUTO: 37.1 % (ref 37–47)
HGB BLD-MCNC: 11.6 G/DL (ref 12–16)
IMM GRANULOCYTES # BLD AUTO: 0.01 K/UL (ref 0–0.11)
IMM GRANULOCYTES NFR BLD AUTO: 0.1 % (ref 0–0.9)
IRON SATN MFR SERPL: 18 % (ref 15–55)
IRON SERPL-MCNC: 69 UG/DL (ref 40–170)
LYMPHOCYTES # BLD AUTO: 3.58 K/UL (ref 1–4.8)
LYMPHOCYTES NFR BLD: 51.4 % (ref 22–41)
MAGNESIUM SERPL-MCNC: 2 MG/DL (ref 1.5–2.5)
MCH RBC QN AUTO: 30.6 PG (ref 27–33)
MCHC RBC AUTO-ENTMCNC: 31.3 G/DL (ref 33.6–35)
MCV RBC AUTO: 97.9 FL (ref 81.4–97.8)
MONOCYTES # BLD AUTO: 0.54 K/UL (ref 0–0.85)
MONOCYTES NFR BLD AUTO: 7.7 % (ref 0–13.4)
NEUTROPHILS # BLD AUTO: 2.56 K/UL (ref 2–7.15)
NEUTROPHILS NFR BLD: 36.8 % (ref 44–72)
NRBC # BLD AUTO: 0 K/UL
NRBC BLD AUTO-RTO: 0 /100 WBC
PLATELET # BLD AUTO: 369 K/UL (ref 164–446)
PMV BLD AUTO: 10 FL (ref 9–12.9)
POTASSIUM SERPL-SCNC: 4.1 MMOL/L (ref 3.6–5.5)
PROT SERPL-MCNC: 5.8 G/DL (ref 6–8.2)
RBC # BLD AUTO: 3.79 M/UL (ref 4.2–5.4)
SODIUM SERPL-SCNC: 138 MMOL/L (ref 135–145)
TIBC SERPL-MCNC: 392 UG/DL (ref 250–450)
WBC # BLD AUTO: 7 K/UL (ref 4.8–10.8)

## 2017-08-09 PROCEDURE — 82274 ASSAY TEST FOR BLOOD FECAL: CPT

## 2017-08-09 PROCEDURE — 83540 ASSAY OF IRON: CPT

## 2017-08-09 PROCEDURE — 36415 COLL VENOUS BLD VENIPUNCTURE: CPT

## 2017-08-09 PROCEDURE — 83735 ASSAY OF MAGNESIUM: CPT

## 2017-08-09 PROCEDURE — 80053 COMPREHEN METABOLIC PANEL: CPT

## 2017-08-09 PROCEDURE — 83550 IRON BINDING TEST: CPT

## 2017-08-09 PROCEDURE — 82728 ASSAY OF FERRITIN: CPT

## 2017-08-09 PROCEDURE — 85025 COMPLETE CBC W/AUTO DIFF WBC: CPT

## 2017-08-13 LAB — HEMOCCULT STL QL IA: NEGATIVE

## 2017-09-05 ENCOUNTER — TELEPHONE (OUTPATIENT)
Dept: MEDICAL GROUP | Facility: PHYSICIAN GROUP | Age: 67
End: 2017-09-05

## 2017-09-05 NOTE — TELEPHONE ENCOUNTER
ESTABLISHED PATIENT PRE-VISIT PLANNING     Note: Patient will not be contacted if there is no indication to call.     1.  Reviewed notes from the last few office visits within the medical group: Yes    2.  If any orders were placed at last visit or intended to be done for this visit (i.e. 6 mos follow-up), do we have Results/Consult Notes?        •  Labs - Labs ordered, completed and results are in chart.       •  Imaging - Imaging ordered, completed and results are in chart.       •  Referrals - No referrals were ordered at last office visit.    3. Is this appointment scheduled as a Hospital Follow-Up? No    4.  Immunizations were updated in Epic using WebIZ?: Epic matches WebIZ       •  Web Iz Recommendations: FLU and TDAP    5.  Patient is due for the following Health Maintenance Topics:   Health Maintenance Due   Topic Date Due   • Annual Wellness Visit  1950   • COLONOSCOPY  07/25/1999   • IMM DTaP/Tdap/Td Vaccine (1 - Tdap) 08/07/2009   • BONE DENSITY  07/25/2014   • IMM INFLUENZA (1) 09/01/2017           6.  Patient was informed to arrive 15 min prior to their scheduled appointment and bring in their medication bottles.

## 2017-09-06 ENCOUNTER — OFFICE VISIT (OUTPATIENT)
Dept: MEDICAL GROUP | Facility: PHYSICIAN GROUP | Age: 67
End: 2017-09-06
Payer: MEDICARE

## 2017-09-06 VITALS
WEIGHT: 118 LBS | HEIGHT: 67 IN | BODY MASS INDEX: 18.52 KG/M2 | DIASTOLIC BLOOD PRESSURE: 82 MMHG | SYSTOLIC BLOOD PRESSURE: 130 MMHG | HEART RATE: 70 BPM | RESPIRATION RATE: 12 BRPM | OXYGEN SATURATION: 98 % | TEMPERATURE: 98.1 F

## 2017-09-06 DIAGNOSIS — E86.0 DEHYDRATION: ICD-10-CM

## 2017-09-06 DIAGNOSIS — E87.6 HYPOKALEMIA: ICD-10-CM

## 2017-09-06 DIAGNOSIS — D64.9 ANEMIA, UNSPECIFIED TYPE: ICD-10-CM

## 2017-09-06 DIAGNOSIS — R25.2 MUSCLE CRAMPS: ICD-10-CM

## 2017-09-06 PROCEDURE — 99213 OFFICE O/P EST LOW 20 MIN: CPT | Performed by: FAMILY MEDICINE

## 2017-09-06 NOTE — PROGRESS NOTES
Chief Complaint   Patient presents with   • Follow-Up     1 mo fv        HISTORY OF PRESENT ILLNESS: Patient is a 67 y.o. female established patient here today for the following concerns:    1. Anemia, unspecified type  2. Hypokalemia  3. Dehydration  4. Muscle cramps  Here for follow up after having several weeks of feeling weak, getting muscle cramps excessively fatigued.  Labs demonstrated anemia and low potassium.  She has since replaced potassium and started iron (dietary restrictions) and has started drinking coconut water and feels amazing.  She had negative FIT test.  She is very happy and was able to hike more than nearly 10 miles round trip without problem.       Past Medical, Social, and Family history reviewed and updated in EPIC    Allergies:Penicillins    Current Outpatient Prescriptions   Medication Sig Dispense Refill   • IRON PO Take  by mouth.     • Multiple Vitamins-Minerals (OCUVITE PO) Take  by mouth.     • POTASSIUM PO Take  by mouth.     • Omega-3 Fatty Acids (FISH OIL) 1000 MG Cap capsule Take 1,000 mg by mouth every day.     • Zinc 50 MG Tab Take 50 mg by mouth every day.     • Vitamin D, Cholecalciferol, 1000 UNITS Cap Take 1,000 Units by mouth every day.     • aspirin EC (ECOTRIN) 81 MG Tablet Delayed Response Take 81 mg by mouth every day.     • ferrous sulfate 325 (65 FE) MG tablet Take 1 Tab by mouth every day. 30 Tab 0   • calcium citrate (CALCITRATE) 950 MG Tab Take 950 mg by mouth every day.       No current facility-administered medications for this visit.          ROS:  Review of Systems   Constitutional: Negative for fever, chills, weight loss and malaise/fatigue.   HENT: Negative for ear pain, nosebleeds, congestion, sore throat and neck pain.    Eyes: Negative for blurred vision.   Respiratory: Negative for cough, sputum production, shortness of breath and wheezing.    Cardiovascular: Negative for chest pain, palpitations,  and leg swelling.   Gastrointestinal: Negative for  "heartburn, nausea, vomiting, diarrhea and abdominal pain.   Genitourinary: Negative for dysuria, urgency and frequency.   Musculoskeletal: Negative for myalgias, back pain and joint pain.   Skin: Negative for rash and itching.   Neurological: Negative for dizziness, tingling, tremors, sensory change, focal weakness and headaches.   Endo/Heme/Allergies: Does not bruise/bleed easily.   Psychiatric/Behavioral: Negative for depression, anxiety, suicidal ideas, insomnia and memory loss.      Exam:  Blood pressure 130/82, pulse 70, temperature 36.7 °C (98.1 °F), resp. rate 12, height 1.689 m (5' 6.5\"), weight 53.5 kg (118 lb), SpO2 98 %.    General:  Well nourished, well developed in NAD  Head is grossly normal.  Neck: Supple without JVD   Pulmonary:  Normal effort.   Cardiovascular: Regular rate  Extremities: no clubbing, cyanosis, or edema.  Psych: affect appropriate      Please note that this dictation was created using voice recognition software. I have made every reasonable attempt to correct obvious errors, but I expect that there are errors of grammar and possibly content that I did not discover before finalizing the note.    Assessment/Plan:  1. Anemia, unspecified type  2. Hypokalemia  3. Dehydration  4. Muscle cramps  All resolving.  Continue OTC iron and potassium supplementation.  Continue hydration efforts.        Follow up in 3-6 months sooner prn.        "

## 2017-09-19 ENCOUNTER — HOSPITAL ENCOUNTER (OUTPATIENT)
Facility: MEDICAL CENTER | Age: 67
End: 2017-09-19
Payer: COMMERCIAL

## 2017-09-19 LAB
ALBUMIN SERPL BCP-MCNC: 4.1 G/DL (ref 3.2–4.9)
ALBUMIN/GLOB SERPL: 1.3 G/DL
ALP SERPL-CCNC: 112 U/L (ref 30–99)
ALT SERPL-CCNC: 30 U/L (ref 2–50)
ANION GAP SERPL CALC-SCNC: 9 MMOL/L (ref 0–11.9)
AST SERPL-CCNC: 35 U/L (ref 12–45)
BDY FAT % MEASURED: 26.8 %
BILIRUB SERPL-MCNC: 0.5 MG/DL (ref 0.1–1.5)
BP DIAS: 76 MMHG
BP SYS: 134 MMHG
BUN SERPL-MCNC: 10 MG/DL (ref 8–22)
CALCIUM SERPL-MCNC: 9.2 MG/DL (ref 8.5–10.5)
CHLORIDE SERPL-SCNC: 106 MMOL/L (ref 96–112)
CHOLEST SERPL-MCNC: 197 MG/DL (ref 100–199)
CO2 SERPL-SCNC: 27 MMOL/L (ref 20–33)
CREAT SERPL-MCNC: 0.65 MG/DL (ref 0.5–1.4)
DIABETES HTDIA: NO
EVENT NAME HTEVT: NORMAL
GFR SERPL CREATININE-BSD FRML MDRD: >60 ML/MIN/1.73 M 2
GLOBULIN SER CALC-MCNC: 3.1 G/DL (ref 1.9–3.5)
GLUCOSE SERPL-MCNC: 89 MG/DL (ref 65–99)
HDLC SERPL-MCNC: 91 MG/DL
HYPERTENSION HTHYP: NO
LDLC SERPL CALC-MCNC: 93 MG/DL
POTASSIUM SERPL-SCNC: 4 MMOL/L (ref 3.6–5.5)
PROT SERPL-MCNC: 7.2 G/DL (ref 6–8.2)
SCREENING LOC CITY HTCIT: NORMAL
SCREENING LOC STATE HTSTA: NORMAL
SCREENING LOCATION HTLOC: NORMAL
SODIUM SERPL-SCNC: 142 MMOL/L (ref 135–145)
SUBSCRIBER ID HTSID: NORMAL
TRIGL SERPL-MCNC: 64 MG/DL (ref 0–149)

## 2018-04-24 ENCOUNTER — OFFICE VISIT (OUTPATIENT)
Dept: URGENT CARE | Facility: CLINIC | Age: 68
End: 2018-04-24
Payer: MEDICARE

## 2018-04-24 VITALS
WEIGHT: 150 LBS | OXYGEN SATURATION: 99 % | TEMPERATURE: 98.6 F | SYSTOLIC BLOOD PRESSURE: 128 MMHG | HEART RATE: 66 BPM | RESPIRATION RATE: 16 BRPM | DIASTOLIC BLOOD PRESSURE: 82 MMHG | BODY MASS INDEX: 23.54 KG/M2 | HEIGHT: 67 IN

## 2018-04-24 DIAGNOSIS — R11.14 BILIOUS VOMITING WITH NAUSEA: ICD-10-CM

## 2018-04-24 DIAGNOSIS — K52.9 CHRONIC DIARRHEA: ICD-10-CM

## 2018-04-24 DIAGNOSIS — K52.9 GASTROENTERITIS: Primary | ICD-10-CM

## 2018-04-24 PROCEDURE — 99214 OFFICE O/P EST MOD 30 MIN: CPT | Performed by: PHYSICIAN ASSISTANT

## 2018-04-24 RX ORDER — DICYCLOMINE HCL 20 MG
20 TABLET ORAL EVERY 6 HOURS
Qty: 20 TAB | Refills: 0 | Status: SHIPPED | OUTPATIENT
Start: 2018-04-24 | End: 2018-04-29

## 2018-04-24 RX ORDER — ONDANSETRON 4 MG/1
4 TABLET, ORALLY DISINTEGRATING ORAL EVERY 8 HOURS PRN
Qty: 10 TAB | Refills: 0 | Status: SHIPPED | OUTPATIENT
Start: 2018-04-24 | End: 2018-04-27

## 2018-04-24 NOTE — PATIENT INSTRUCTIONS
Irritable Bowel Syndrome, Adult  Irritable bowel syndrome (IBS) is not one specific disease. It is a group of symptoms that affects the organs responsible for digestion (gastrointestinal or GI tract).  To regulate how your GI tract works, your body sends signals back and forth between your intestines and your brain. If you have IBS, there may be a problem with these signals. As a result, your GI tract does not function normally. Your intestines may become more sensitive and overreact to certain things. This is especially true when you eat certain foods or when you are under stress.  There are four types of IBS. These may be determined based on the consistency of your stool:  · IBS with diarrhea.  · IBS with constipation.  · Mixed IBS.  · Unsubtyped IBS.  It is important to know which type of IBS you have. Some treatments are more likely to be helpful for certain types of IBS.  What are the causes?  The exact cause of IBS is not known.  What increases the risk?  You may have a higher risk of IBS if:  · You are a woman.  · You are younger than 45 years old.  · You have a family history of IBS.  · You have mental health problems.  · You have had bacterial infection of your GI tract.  What are the signs or symptoms?  Symptoms of IBS vary from person to person. The main symptom is abdominal pain or discomfort. Additional symptoms usually include one or more of the following:  · Diarrhea, constipation, or both.  · Abdominal swelling or bloating.  · Feeling full or sick after eating a small or regular-size meal.  · Frequent gas.  · Mucus in the stool.  · A feeling of having more stool left after a bowel movement.  Symptoms tend to come and go. They may be associated with stress, psychiatric conditions, or nothing at all.  How is this diagnosed?  There is no specific test to diagnose IBS. Your health care provider will make a diagnosis based on a physical exam, medical history, and your symptoms. You may have other tests to  rule out other conditions that may be causing your symptoms. These may include:  · Blood tests.  · X-rays.  · CT scan.  · Endoscopy and colonoscopy. This is a test in which your GI tract is viewed with a long, thin, flexible tube.  How is this treated?  There is no cure for IBS, but treatment can help relieve symptoms. IBS treatment often includes:  · Changes to your diet, such as:  ¨ Eating more fiber.  ¨ Avoiding foods that cause symptoms.  ¨ Drinking more water.  ¨ Eating regular, medium-sized portioned meals.  · Medicines. These may include:  ¨ Fiber supplements if you have constipation.  ¨ Medicine to control diarrhea (antidiarrheal medicines).  ¨ Medicine to help control muscle spasms in your GI tract (antispasmodic medicines).  ¨ Medicines to help with any mental health issues, such as antidepressants or tranquilizers.  · Therapy.  ¨ Talk therapy may help with anxiety, depression, or other mental health issues that can make IBS symptoms worse.  · Stress reduction.  ¨ Managing your stress can help keep symptoms under control.  Follow these instructions at home:  · Take medicines only as directed by your health care provider.  · Eat a healthy diet.  ¨ Avoid foods and drinks with added sugar.  ¨ Include more whole grains, fruits, and vegetables gradually into your diet. This may be especially helpful if you have IBS with constipation.  ¨ Avoid any foods and drinks that make your symptoms worse. These may include dairy products and caffeinated or carbonated drinks.  ¨ Do not eat large meals.  ¨ Drink enough fluid to keep your urine clear or pale yellow.  · Exercise regularly. Ask your health care provider for recommendations of good activities for you.  · Keep all follow-up visits as directed by your health care provider. This is important.  Contact a health care provider if:  · You have constant pain.  · You have trouble or pain with swallowing.  · You have worsening diarrhea.  Get help right away if:  · You  have severe and worsening abdominal pain.  · You have diarrhea and:  ¨ You have a rash, stiff neck, or severe headache.  ¨ You are irritable, sleepy, or difficult to awaken.  ¨ You are weak, dizzy, or extremely thirsty.  · You have bright red blood in your stool or you have black tarry stools.  · You have unusual abdominal swelling that is painful.  · You vomit continuously.  · You vomit blood (hematemesis).  · You have both abdominal pain and a fever.  This information is not intended to replace advice given to you by your health care provider. Make sure you discuss any questions you have with your health care provider.  Document Released: 12/18/2006 Document Revised: 05/19/2017 Document Reviewed: 09/04/2015  [employer] Interactive Patient Education © 2017 [employer]

## 2018-04-25 NOTE — PROGRESS NOTES
Subjective:      Pt is a 67 y.o. female who presents with Other (pt states she has been having stomach problems with bubbling, diarrhea, and vomiting x 2 weeks)            HPI  This is a new problem. The current episode started in the past 2 weeks. PT denies recent antibiotic use.  The problem occurs 2 to 4 times per day. The problem has been gradually worsening. The stool consistency is described as watery. The patient states that diarrhea awakens her from sleep. Nothing aggravates the symptoms. Risk factors include suspect food intake. She has tried anti-motility drug for the symptoms. The treatment provided no relief. There is no history of bowel resection, inflammatory bowel disease, irritable bowel syndrome, malabsorption, a recent abdominal surgery or short gut syndrome. Pt notes chronic diarrhea x 2 months overall with no foreign travel or exposure to untreated water sources or hx of GI disease.   Pt states for the last 2 weeks, they have had abd cramping, watery diarrhea, with nausea and vomiting. Pt denies blood or mucus in the stool. Pt suspects contaminated food as etiology. Pt states OTC Pepto is not helping.  Pt has not taken any Rx medications for this condition. PT states the pain is a 4/10, aching in nature and worse at night. Pt denies CP, SOB,  paresthesias, headaches, dizziness, change in vision, hives, or joint pain. The pt's medication list, problem list, and allergies have been evaluated and reviewed during today's visit.     PMH:  Chronic diarrhea      PSH:  Past Surgical History:   Procedure Laterality Date   • TONSILLECTOMY     • TUBAL COAGULATION LAPAROSCOPIC BILATERAL         Fam Hx:    family history includes Cancer in her sister; Dementia in her mother; Lung Disease in her paternal grandfather; Other in her brother.  Family Status   Relation Status   • Mother    • Father Alive   • Sister Alive   • Brother Alive   • Maternal Grandmother    • Maternal Grandfather     • Paternal Grandmother    • Paternal Grandfather    • Sister Alive   • Brother Alive       Soc HX:  Social History     Social History   • Marital status:      Spouse name: N/A   • Number of children: N/A   • Years of education: N/A     Occupational History   • Not on file.     Social History Main Topics   • Smoking status: Never Smoker   • Smokeless tobacco: Never Used   • Alcohol use Yes      Comment: very occasionally   • Drug use: No   • Sexual activity: Yes     Partners: Male     Other Topics Concern   • Not on file     Social History Narrative   • No narrative on file         Medications:    Current Outpatient Prescriptions:   •  dicyclomine (BENTYL) 20 MG Tab, Take 1 Tab by mouth every 6 hours for 5 days., Disp: 20 Tab, Rfl: 0  •  ondansetron (ZOFRAN ODT) 4 MG TABLET DISPERSIBLE, Take 1 Tab by mouth every 8 hours as needed for up to 3 days., Disp: 10 Tab, Rfl: 0  •  IRON PO, Take  by mouth., Disp: , Rfl:   •  Multiple Vitamins-Minerals (OCUVITE PO), Take  by mouth., Disp: , Rfl:   •  POTASSIUM PO, Take  by mouth., Disp: , Rfl:   •  ferrous sulfate 325 (65 FE) MG tablet, Take 1 Tab by mouth every day., Disp: 30 Tab, Rfl: 0  •  Omega-3 Fatty Acids (FISH OIL) 1000 MG Cap capsule, Take 1,000 mg by mouth every day., Disp: , Rfl:   •  calcium citrate (CALCITRATE) 950 MG Tab, Take 950 mg by mouth every day., Disp: , Rfl:   •  Zinc 50 MG Tab, Take 50 mg by mouth every day., Disp: , Rfl:   •  Vitamin D, Cholecalciferol, 1000 UNITS Cap, Take 1,000 Units by mouth every day., Disp: , Rfl:   •  aspirin EC (ECOTRIN) 81 MG Tablet Delayed Response, Take 81 mg by mouth every day., Disp: , Rfl:       Allergies:  Other food and Penicillins    ROS  Constitutional: Negative for fever, chills and malaise/fatigue.   HENT: Negative for congestion and sore throat.    Eyes: Negative for blurred vision, double vision and photophobia.   Respiratory: Negative for cough and shortness of breath.    Cardiovascular:  "Negative for chest pain and palpitations.   Gastrointestinal: POS nausea, vomiting, abdominal pain, diarrhea.   Genitourinary: Negative for dysuria and flank pain.   Musculoskeletal: Negative for joint pain and myalgias.   Skin: Negative for itching and rash.   Neurological: Negative for dizziness, tingling and headaches.   Endo/Heme/Allergies: Does not bruise/bleed easily.   Psychiatric/Behavioral: Negative for depression. The patient is not nervous/anxious.           Objective:     /82   Pulse 66   Temp 37 °C (98.6 °F)   Resp 16   Ht 1.702 m (5' 7\")   Wt 68 kg (150 lb)   SpO2 99%   BMI 23.49 kg/m²      Physical Exam   Abdominal: Soft. Normal appearance. She exhibits no shifting dullness, no distension, no pulsatile liver, no fluid wave, no abdominal bruit, no ascites, no pulsatile midline mass and no mass. Bowel sounds are increased. There is no hepatosplenomegaly. There is generalized tenderness. There is no rigidity, no rebound, no guarding, no CVA tenderness, no tenderness at McBurney's point and negative Medley's sign. No hernia.             Constitutional: PT is oriented to person, place, and time. PT appears well-developed and well-nourished. No distress.   HENT:   Head: Normocephalic and atraumatic.   Mouth/Throat: Oropharynx is clear and moist. No oropharyngeal exudate.   Eyes: Conjunctivae normal and EOM are normal. Pupils are equal, round, and reactive to light.   Neck: Normal range of motion. Neck supple. No thyromegaly present.   Cardiovascular: Normal rate, regular rhythm, normal heart sounds and intact distal pulses.  Exam reveals no gallop and no friction rub.    No murmur heard.  Pulmonary/Chest: Effort normal and breath sounds normal. No respiratory distress. PT has no wheezes. PT has no rales. Pt exhibits no tenderness.   Musculoskeletal: Normal range of motion. PT exhibits no edema and no tenderness.   Neurological: PT is alert and oriented to person, place, and time. PT has normal " reflexes. No cranial nerve deficit.   Skin: Skin is warm and dry. No rash noted. PT is not diaphoretic. No erythema.       Psychiatric: PT has a normal mood and affect. PT behavior is normal. Judgment and thought content normal.          Assessment/Plan:     1. Gastroenteritis    - dicyclomine (BENTYL) 20 MG Tab; Take 1 Tab by mouth every 6 hours for 5 days.  Dispense: 20 Tab; Refill: 0  - REFERRAL TO GASTROENTEROLOGY    2. Bilious vomiting with nausea    - ondansetron (ZOFRAN ODT) 4 MG TABLET DISPERSIBLE; Take 1 Tab by mouth every 8 hours as needed for up to 3 days.  Dispense: 10 Tab; Refill: 0  - REFERRAL TO GASTROENTEROLOGY    3. Chronic diarrhea    - REFERRAL TO GASTROENTEROLOGY    Rest, fluids encouraged.  Note given for work.  AVS with medical info given.  Pt was in full understanding and agreement with the plan.  Follow-up as needed if symptoms worsen or fail to improve.

## 2018-04-30 ENCOUNTER — HOSPITAL ENCOUNTER (OUTPATIENT)
Dept: LAB | Facility: MEDICAL CENTER | Age: 68
End: 2018-04-30
Attending: NURSE PRACTITIONER
Payer: MEDICARE

## 2018-04-30 LAB
BASOPHILS # BLD AUTO: 0.4 % (ref 0–1.8)
BASOPHILS # BLD: 0.03 K/UL (ref 0–0.12)
EOSINOPHIL # BLD AUTO: 0.04 K/UL (ref 0–0.51)
EOSINOPHIL NFR BLD: 0.6 % (ref 0–6.9)
ERYTHROCYTE [DISTWIDTH] IN BLOOD BY AUTOMATED COUNT: 43.4 FL (ref 35.9–50)
FERRITIN SERPL-MCNC: 40.8 NG/ML (ref 10–291)
HCT VFR BLD AUTO: 41.7 % (ref 37–47)
HGB BLD-MCNC: 13.7 G/DL (ref 12–16)
IMM GRANULOCYTES # BLD AUTO: 0.01 K/UL (ref 0–0.11)
IMM GRANULOCYTES NFR BLD AUTO: 0.1 % (ref 0–0.9)
IRON SATN MFR SERPL: 35 % (ref 15–55)
IRON SERPL-MCNC: 147 UG/DL (ref 40–170)
LYMPHOCYTES # BLD AUTO: 2.39 K/UL (ref 1–4.8)
LYMPHOCYTES NFR BLD: 35.2 % (ref 22–41)
MCH RBC QN AUTO: 31.2 PG (ref 27–33)
MCHC RBC AUTO-ENTMCNC: 32.9 G/DL (ref 33.6–35)
MCV RBC AUTO: 95 FL (ref 81.4–97.8)
MONOCYTES # BLD AUTO: 0.51 K/UL (ref 0–0.85)
MONOCYTES NFR BLD AUTO: 7.5 % (ref 0–13.4)
NEUTROPHILS # BLD AUTO: 3.81 K/UL (ref 2–7.15)
NEUTROPHILS NFR BLD: 56.2 % (ref 44–72)
NRBC # BLD AUTO: 0 K/UL
NRBC BLD-RTO: 0 /100 WBC
PLATELET # BLD AUTO: 335 K/UL (ref 164–446)
PMV BLD AUTO: 10.7 FL (ref 9–12.9)
RBC # BLD AUTO: 4.39 M/UL (ref 4.2–5.4)
TIBC SERPL-MCNC: 421 UG/DL (ref 250–450)
TSH SERPL DL<=0.005 MIU/L-ACNC: 1.19 UIU/ML (ref 0.38–5.33)
WBC # BLD AUTO: 6.8 K/UL (ref 4.8–10.8)

## 2018-04-30 PROCEDURE — 82784 ASSAY IGA/IGD/IGG/IGM EACH: CPT

## 2018-04-30 PROCEDURE — 36415 COLL VENOUS BLD VENIPUNCTURE: CPT

## 2018-04-30 PROCEDURE — 84443 ASSAY THYROID STIM HORMONE: CPT

## 2018-04-30 PROCEDURE — 83516 IMMUNOASSAY NONANTIBODY: CPT

## 2018-04-30 PROCEDURE — 83540 ASSAY OF IRON: CPT | Mod: GA

## 2018-04-30 PROCEDURE — 83550 IRON BINDING TEST: CPT | Mod: GA

## 2018-04-30 PROCEDURE — 82728 ASSAY OF FERRITIN: CPT | Mod: GA

## 2018-04-30 PROCEDURE — 85025 COMPLETE CBC W/AUTO DIFF WBC: CPT

## 2018-05-02 LAB
IGA SERPL-MCNC: 194 MG/DL (ref 68–408)
TTG IGA SER IA-ACNC: 0 U/ML (ref 0–3)

## 2018-05-05 ENCOUNTER — HOSPITAL ENCOUNTER (OUTPATIENT)
Facility: MEDICAL CENTER | Age: 68
End: 2018-05-05
Attending: INTERNAL MEDICINE
Payer: MEDICARE

## 2018-05-05 LAB
C DIFF DNA SPEC QL NAA+PROBE: NEGATIVE
C DIFF TOX GENS STL QL NAA+PROBE: NEGATIVE

## 2018-05-05 PROCEDURE — 87493 C DIFF AMPLIFIED PROBE: CPT

## 2018-05-15 ENCOUNTER — HOSPITAL ENCOUNTER (OUTPATIENT)
Dept: RADIOLOGY | Facility: MEDICAL CENTER | Age: 68
End: 2018-05-15
Attending: INTERNAL MEDICINE
Payer: MEDICARE

## 2018-05-15 DIAGNOSIS — R11.0 NAUSEA: ICD-10-CM

## 2018-05-15 DIAGNOSIS — A08.11 EPIDEMIC VOMITING SYNDROME: ICD-10-CM

## 2018-05-15 PROCEDURE — 76700 US EXAM ABDOM COMPLETE: CPT

## 2018-06-24 ENCOUNTER — APPOINTMENT (OUTPATIENT)
Dept: RADIOLOGY | Facility: MEDICAL CENTER | Age: 68
DRG: 062 | End: 2018-06-24
Attending: EMERGENCY MEDICINE
Payer: MEDICARE

## 2018-06-24 ENCOUNTER — HOSPITAL ENCOUNTER (INPATIENT)
Facility: MEDICAL CENTER | Age: 68
LOS: 4 days | DRG: 062 | End: 2018-06-28
Attending: EMERGENCY MEDICINE | Admitting: HOSPITALIST
Payer: MEDICARE

## 2018-06-24 DIAGNOSIS — R41.89 COGNITIVE DEFICITS: ICD-10-CM

## 2018-06-24 DIAGNOSIS — R47.01 APHASIA: ICD-10-CM

## 2018-06-24 DIAGNOSIS — I63.9 CEREBROVASCULAR ACCIDENT (CVA), UNSPECIFIED MECHANISM (HCC): ICD-10-CM

## 2018-06-24 LAB
ALBUMIN SERPL BCP-MCNC: 4.3 G/DL (ref 3.2–4.9)
ALBUMIN/GLOB SERPL: 1.7 G/DL
ALP SERPL-CCNC: 67 U/L (ref 30–99)
ALT SERPL-CCNC: 33 U/L (ref 2–50)
ANION GAP SERPL CALC-SCNC: 9 MMOL/L (ref 0–11.9)
AST SERPL-CCNC: 32 U/L (ref 12–45)
BASOPHILS # BLD AUTO: 0.3 % (ref 0–1.8)
BASOPHILS # BLD: 0.02 K/UL (ref 0–0.12)
BILIRUB SERPL-MCNC: 0.8 MG/DL (ref 0.1–1.5)
BUN SERPL-MCNC: 9 MG/DL (ref 8–22)
CALCIUM SERPL-MCNC: 9.1 MG/DL (ref 8.5–10.5)
CHLORIDE SERPL-SCNC: 90 MMOL/L (ref 96–112)
CHOLEST SERPL-MCNC: 176 MG/DL (ref 100–199)
CO2 SERPL-SCNC: 25 MMOL/L (ref 20–33)
CREAT SERPL-MCNC: 0.64 MG/DL (ref 0.5–1.4)
EOSINOPHIL # BLD AUTO: 0.01 K/UL (ref 0–0.51)
EOSINOPHIL NFR BLD: 0.1 % (ref 0–6.9)
ERYTHROCYTE [DISTWIDTH] IN BLOOD BY AUTOMATED COUNT: 40.5 FL (ref 35.9–50)
GLOBULIN SER CALC-MCNC: 2.6 G/DL (ref 1.9–3.5)
GLUCOSE BLD-MCNC: 153 MG/DL (ref 65–99)
GLUCOSE SERPL-MCNC: 157 MG/DL (ref 65–99)
HCT VFR BLD AUTO: 33.4 % (ref 37–47)
HDLC SERPL-MCNC: 90 MG/DL
HGB BLD-MCNC: 11.8 G/DL (ref 12–16)
IMM GRANULOCYTES # BLD AUTO: 0.02 K/UL (ref 0–0.11)
IMM GRANULOCYTES NFR BLD AUTO: 0.3 % (ref 0–0.9)
INR PPP: 1.05 (ref 0.87–1.13)
LDLC SERPL CALC-MCNC: 77 MG/DL
LYMPHOCYTES # BLD AUTO: 1.76 K/UL (ref 1–4.8)
LYMPHOCYTES NFR BLD: 23.2 % (ref 22–41)
MCH RBC QN AUTO: 31.6 PG (ref 27–33)
MCHC RBC AUTO-ENTMCNC: 35.3 G/DL (ref 33.6–35)
MCV RBC AUTO: 89.5 FL (ref 81.4–97.8)
MONOCYTES # BLD AUTO: 0.46 K/UL (ref 0–0.85)
MONOCYTES NFR BLD AUTO: 6.1 % (ref 0–13.4)
NEUTROPHILS # BLD AUTO: 5.3 K/UL (ref 2–7.15)
NEUTROPHILS NFR BLD: 70 % (ref 44–72)
NRBC # BLD AUTO: 0 K/UL
NRBC BLD-RTO: 0 /100 WBC
OSMOLALITY SERPL: 266 MOSM/KG H2O (ref 278–298)
PLATELET # BLD AUTO: 258 K/UL (ref 164–446)
PMV BLD AUTO: 10 FL (ref 9–12.9)
POTASSIUM SERPL-SCNC: 3.4 MMOL/L (ref 3.6–5.5)
PROT SERPL-MCNC: 6.9 G/DL (ref 6–8.2)
PROTHROMBIN TIME: 13.4 SEC (ref 12–14.6)
RBC # BLD AUTO: 3.73 M/UL (ref 4.2–5.4)
SODIUM SERPL-SCNC: 124 MMOL/L (ref 135–145)
TRIGL SERPL-MCNC: 45 MG/DL (ref 0–149)
TROPONIN I SERPL-MCNC: <0.01 NG/ML (ref 0–0.04)
TSH SERPL DL<=0.005 MIU/L-ACNC: 1.16 UIU/ML (ref 0.38–5.33)
WBC # BLD AUTO: 7.6 K/UL (ref 4.8–10.8)

## 2018-06-24 PROCEDURE — 83036 HEMOGLOBIN GLYCOSYLATED A1C: CPT

## 2018-06-24 PROCEDURE — 84443 ASSAY THYROID STIM HORMONE: CPT

## 2018-06-24 PROCEDURE — 71045 X-RAY EXAM CHEST 1 VIEW: CPT

## 2018-06-24 PROCEDURE — 700111 HCHG RX REV CODE 636 W/ 250 OVERRIDE (IP): Mod: JG | Performed by: EMERGENCY MEDICINE

## 2018-06-24 PROCEDURE — 700105 HCHG RX REV CODE 258: Performed by: HOSPITALIST

## 2018-06-24 PROCEDURE — 84484 ASSAY OF TROPONIN QUANT: CPT

## 2018-06-24 PROCEDURE — 80307 DRUG TEST PRSMV CHEM ANLYZR: CPT

## 2018-06-24 PROCEDURE — 93005 ELECTROCARDIOGRAM TRACING: CPT | Performed by: EMERGENCY MEDICINE

## 2018-06-24 PROCEDURE — 83935 ASSAY OF URINE OSMOLALITY: CPT

## 2018-06-24 PROCEDURE — 3E03317 INTRODUCTION OF OTHER THROMBOLYTIC INTO PERIPHERAL VEIN, PERCUTANEOUS APPROACH: ICD-10-PCS | Performed by: EMERGENCY MEDICINE

## 2018-06-24 PROCEDURE — 700117 HCHG RX CONTRAST REV CODE 255: Performed by: EMERGENCY MEDICINE

## 2018-06-24 PROCEDURE — 80061 LIPID PANEL: CPT

## 2018-06-24 PROCEDURE — 70450 CT HEAD/BRAIN W/O DYE: CPT

## 2018-06-24 PROCEDURE — 99291 CRITICAL CARE FIRST HOUR: CPT | Performed by: HOSPITALIST

## 2018-06-24 PROCEDURE — 70498 CT ANGIOGRAPHY NECK: CPT

## 2018-06-24 PROCEDURE — 99291 CRITICAL CARE FIRST HOUR: CPT | Performed by: INTERNAL MEDICINE

## 2018-06-24 PROCEDURE — 770022 HCHG ROOM/CARE - ICU (200)

## 2018-06-24 PROCEDURE — 70496 CT ANGIOGRAPHY HEAD: CPT

## 2018-06-24 PROCEDURE — 37212 THROMBOLYTIC VENOUS THERAPY: CPT

## 2018-06-24 PROCEDURE — 80053 COMPREHEN METABOLIC PANEL: CPT

## 2018-06-24 PROCEDURE — 85025 COMPLETE CBC W/AUTO DIFF WBC: CPT

## 2018-06-24 PROCEDURE — 36415 COLL VENOUS BLD VENIPUNCTURE: CPT

## 2018-06-24 PROCEDURE — 81003 URINALYSIS AUTO W/O SCOPE: CPT

## 2018-06-24 PROCEDURE — 99291 CRITICAL CARE FIRST HOUR: CPT

## 2018-06-24 PROCEDURE — 0042T CT-CEREBRAL PERFUSION ANALYSIS: CPT

## 2018-06-24 PROCEDURE — 83930 ASSAY OF BLOOD OSMOLALITY: CPT

## 2018-06-24 PROCEDURE — 85610 PROTHROMBIN TIME: CPT

## 2018-06-24 PROCEDURE — 82962 GLUCOSE BLOOD TEST: CPT

## 2018-06-24 RX ORDER — POLYETHYLENE GLYCOL 3350 17 G/17G
1 POWDER, FOR SOLUTION ORAL
Status: DISCONTINUED | OUTPATIENT
Start: 2018-06-24 | End: 2018-06-28 | Stop reason: HOSPADM

## 2018-06-24 RX ORDER — ASPIRIN 300 MG/1
300 SUPPOSITORY RECTAL DAILY
Status: DISCONTINUED | OUTPATIENT
Start: 2018-06-25 | End: 2018-06-28 | Stop reason: HOSPADM

## 2018-06-24 RX ORDER — SODIUM CHLORIDE 9 MG/ML
INJECTION, SOLUTION INTRAVENOUS CONTINUOUS
Status: DISCONTINUED | OUTPATIENT
Start: 2018-06-24 | End: 2018-06-25

## 2018-06-24 RX ORDER — POTASSIUM CHLORIDE 7.45 MG/ML
10 INJECTION INTRAVENOUS
Status: COMPLETED | OUTPATIENT
Start: 2018-06-25 | End: 2018-06-25

## 2018-06-24 RX ORDER — ASPIRIN 325 MG
325 TABLET ORAL DAILY
Status: DISCONTINUED | OUTPATIENT
Start: 2018-06-25 | End: 2018-06-28 | Stop reason: HOSPADM

## 2018-06-24 RX ORDER — AMOXICILLIN 250 MG
2 CAPSULE ORAL 2 TIMES DAILY
Status: DISCONTINUED | OUTPATIENT
Start: 2018-06-24 | End: 2018-06-28 | Stop reason: HOSPADM

## 2018-06-24 RX ORDER — LABETALOL HYDROCHLORIDE 5 MG/ML
10 INJECTION, SOLUTION INTRAVENOUS EVERY 4 HOURS PRN
Status: DISCONTINUED | OUTPATIENT
Start: 2018-06-24 | End: 2018-06-28 | Stop reason: HOSPADM

## 2018-06-24 RX ORDER — BISACODYL 10 MG
10 SUPPOSITORY, RECTAL RECTAL
Status: DISCONTINUED | OUTPATIENT
Start: 2018-06-24 | End: 2018-06-28 | Stop reason: HOSPADM

## 2018-06-24 RX ORDER — SODIUM CHLORIDE 9 MG/ML
50 INJECTION, SOLUTION INTRAVENOUS ONCE
Status: ACTIVE | OUTPATIENT
Start: 2018-06-24 | End: 2018-06-25

## 2018-06-24 RX ORDER — MAGNESIUM GLUCONATE 27 MG(500)
500 TABLET ORAL 3 TIMES DAILY
Status: ON HOLD | COMMUNITY
End: 2018-08-08

## 2018-06-24 RX ORDER — ASPIRIN 81 MG/1
324 TABLET, CHEWABLE ORAL DAILY
Status: DISCONTINUED | OUTPATIENT
Start: 2018-06-25 | End: 2018-06-28 | Stop reason: HOSPADM

## 2018-06-24 RX ORDER — HYDRALAZINE HYDROCHLORIDE 20 MG/ML
10 INJECTION INTRAMUSCULAR; INTRAVENOUS
Status: DISCONTINUED | OUTPATIENT
Start: 2018-06-24 | End: 2018-06-28

## 2018-06-24 RX ADMIN — SODIUM CHLORIDE: 9 INJECTION, SOLUTION INTRAVENOUS at 23:35

## 2018-06-24 RX ADMIN — IOHEXOL 120 ML: 350 INJECTION, SOLUTION INTRAVENOUS at 21:45

## 2018-06-24 RX ADMIN — ALTEPLASE 49.5 MG: KIT at 22:36

## 2018-06-24 ASSESSMENT — PAIN SCALES - GENERAL
PAINLEVEL_OUTOF10: 0
PAINLEVEL_OUTOF10: 0

## 2018-06-25 ENCOUNTER — APPOINTMENT (OUTPATIENT)
Dept: RADIOLOGY | Facility: MEDICAL CENTER | Age: 68
DRG: 062 | End: 2018-06-25
Attending: HOSPITALIST
Payer: MEDICARE

## 2018-06-25 PROBLEM — R73.9 HYPERGLYCEMIA: Status: ACTIVE | Noted: 2018-06-25

## 2018-06-25 PROBLEM — E87.1 HYPONATREMIA: Status: ACTIVE | Noted: 2018-06-25

## 2018-06-25 PROBLEM — I63.9 CVA (CEREBRAL VASCULAR ACCIDENT) (HCC): Status: ACTIVE | Noted: 2018-06-25

## 2018-06-25 LAB
ALBUMIN SERPL BCP-MCNC: 4 G/DL (ref 3.2–4.9)
ALBUMIN/GLOB SERPL: 1.6 G/DL
ALP SERPL-CCNC: 61 U/L (ref 30–99)
ALT SERPL-CCNC: 29 U/L (ref 2–50)
AMPHET UR QL SCN: NEGATIVE
ANION GAP SERPL CALC-SCNC: 7 MMOL/L (ref 0–11.9)
APPEARANCE UR: CLEAR
AST SERPL-CCNC: 24 U/L (ref 12–45)
BARBITURATES UR QL SCN: NEGATIVE
BASOPHILS # BLD AUTO: 0.5 % (ref 0–1.8)
BASOPHILS # BLD: 0.04 K/UL (ref 0–0.12)
BENZODIAZ UR QL SCN: NEGATIVE
BILIRUB SERPL-MCNC: 0.9 MG/DL (ref 0.1–1.5)
BILIRUB UR QL STRIP.AUTO: NEGATIVE
BUN SERPL-MCNC: 8 MG/DL (ref 8–22)
BZE UR QL SCN: NEGATIVE
CALCIUM SERPL-MCNC: 8.9 MG/DL (ref 8.5–10.5)
CANNABINOIDS UR QL SCN: NEGATIVE
CHLORIDE SERPL-SCNC: 104 MMOL/L (ref 96–112)
CO2 SERPL-SCNC: 27 MMOL/L (ref 20–33)
COLOR UR: YELLOW
CREAT SERPL-MCNC: 0.66 MG/DL (ref 0.5–1.4)
EOSINOPHIL # BLD AUTO: 0 K/UL (ref 0–0.51)
EOSINOPHIL NFR BLD: 0 % (ref 0–6.9)
ERYTHROCYTE [DISTWIDTH] IN BLOOD BY AUTOMATED COUNT: 41.4 FL (ref 35.9–50)
EST. AVERAGE GLUCOSE BLD GHB EST-MCNC: 128 MG/DL
GLOBULIN SER CALC-MCNC: 2.5 G/DL (ref 1.9–3.5)
GLUCOSE SERPL-MCNC: 109 MG/DL (ref 65–99)
GLUCOSE UR STRIP.AUTO-MCNC: NEGATIVE MG/DL
HBA1C MFR BLD: 6.1 % (ref 0–5.6)
HCT VFR BLD AUTO: 35.9 % (ref 37–47)
HGB BLD-MCNC: 12.5 G/DL (ref 12–16)
IMM GRANULOCYTES # BLD AUTO: 0.02 K/UL (ref 0–0.11)
IMM GRANULOCYTES NFR BLD AUTO: 0.2 % (ref 0–0.9)
KETONES UR STRIP.AUTO-MCNC: NEGATIVE MG/DL
LEUKOCYTE ESTERASE UR QL STRIP.AUTO: NEGATIVE
LYMPHOCYTES # BLD AUTO: 1.71 K/UL (ref 1–4.8)
LYMPHOCYTES NFR BLD: 20.3 % (ref 22–41)
MCH RBC QN AUTO: 31.6 PG (ref 27–33)
MCHC RBC AUTO-ENTMCNC: 34.8 G/DL (ref 33.6–35)
MCV RBC AUTO: 90.7 FL (ref 81.4–97.8)
METHADONE UR QL SCN: NEGATIVE
MICRO URNS: NORMAL
MONOCYTES # BLD AUTO: 0.58 K/UL (ref 0–0.85)
MONOCYTES NFR BLD AUTO: 6.9 % (ref 0–13.4)
NEUTROPHILS # BLD AUTO: 6.09 K/UL (ref 2–7.15)
NEUTROPHILS NFR BLD: 72.1 % (ref 44–72)
NITRITE UR QL STRIP.AUTO: NEGATIVE
NRBC # BLD AUTO: 0 K/UL
NRBC BLD-RTO: 0 /100 WBC
OPIATES UR QL SCN: NEGATIVE
OSMOLALITY SERPL: 289 MOSM/KG H2O (ref 278–298)
OSMOLALITY UR: 203 MOSM/KG H2O (ref 300–900)
OSMOLALITY UR: 69 MOSM/KG H2O (ref 300–900)
OXYCODONE UR QL SCN: NEGATIVE
PCP UR QL SCN: NEGATIVE
PH UR STRIP.AUTO: 8 [PH]
PLATELET # BLD AUTO: 279 K/UL (ref 164–446)
PMV BLD AUTO: 10.2 FL (ref 9–12.9)
POTASSIUM SERPL-SCNC: 3.8 MMOL/L (ref 3.6–5.5)
PROPOXYPH UR QL SCN: NEGATIVE
PROT SERPL-MCNC: 6.5 G/DL (ref 6–8.2)
PROT UR QL STRIP: NEGATIVE MG/DL
RBC # BLD AUTO: 3.96 M/UL (ref 4.2–5.4)
RBC UR QL AUTO: NEGATIVE
SODIUM SERPL-SCNC: 136 MMOL/L (ref 135–145)
SODIUM SERPL-SCNC: 138 MMOL/L (ref 135–145)
SODIUM SERPL-SCNC: 138 MMOL/L (ref 135–145)
SP GR UR STRIP.AUTO: 1.02
UROBILINOGEN UR STRIP.AUTO-MCNC: 0.2 MG/DL
WBC # BLD AUTO: 8.4 K/UL (ref 4.8–10.8)

## 2018-06-25 PROCEDURE — G8996 SWALLOW CURRENT STATUS: HCPCS | Mod: CI

## 2018-06-25 PROCEDURE — 770022 HCHG ROOM/CARE - ICU (200)

## 2018-06-25 PROCEDURE — 83930 ASSAY OF BLOOD OSMOLALITY: CPT

## 2018-06-25 PROCEDURE — 80053 COMPREHEN METABOLIC PANEL: CPT

## 2018-06-25 PROCEDURE — A9270 NON-COVERED ITEM OR SERVICE: HCPCS | Performed by: HOSPITALIST

## 2018-06-25 PROCEDURE — 99233 SBSQ HOSP IP/OBS HIGH 50: CPT | Performed by: HOSPITALIST

## 2018-06-25 PROCEDURE — 85025 COMPLETE CBC W/AUTO DIFF WBC: CPT

## 2018-06-25 PROCEDURE — 700102 HCHG RX REV CODE 250 W/ 637 OVERRIDE(OP): Performed by: HOSPITALIST

## 2018-06-25 PROCEDURE — 70551 MRI BRAIN STEM W/O DYE: CPT

## 2018-06-25 PROCEDURE — G8997 SWALLOW GOAL STATUS: HCPCS | Mod: CH

## 2018-06-25 PROCEDURE — 95951 EEG: CPT

## 2018-06-25 PROCEDURE — 92610 EVALUATE SWALLOWING FUNCTION: CPT

## 2018-06-25 PROCEDURE — 700111 HCHG RX REV CODE 636 W/ 250 OVERRIDE (IP): Performed by: HOSPITALIST

## 2018-06-25 PROCEDURE — 700111 HCHG RX REV CODE 636 W/ 250 OVERRIDE (IP): Mod: JG | Performed by: HOSPITALIST

## 2018-06-25 PROCEDURE — 51702 INSERT TEMP BLADDER CATH: CPT

## 2018-06-25 PROCEDURE — 84295 ASSAY OF SERUM SODIUM: CPT

## 2018-06-25 PROCEDURE — 303105 HCHG CATHETER EXTRA

## 2018-06-25 PROCEDURE — 700105 HCHG RX REV CODE 258: Performed by: HOSPITALIST

## 2018-06-25 PROCEDURE — 83935 ASSAY OF URINE OSMOLALITY: CPT

## 2018-06-25 RX ORDER — ATORVASTATIN CALCIUM 40 MG/1
40 TABLET, FILM COATED ORAL
Status: DISCONTINUED | OUTPATIENT
Start: 2018-06-25 | End: 2018-06-28 | Stop reason: HOSPADM

## 2018-06-25 RX ADMIN — ATORVASTATIN CALCIUM 40 MG: 40 TABLET, FILM COATED ORAL at 21:35

## 2018-06-25 RX ADMIN — STANDARDIZED SENNA CONCENTRATE AND DOCUSATE SODIUM 1 TABLET: 8.6; 5 TABLET, FILM COATED ORAL at 21:34

## 2018-06-25 RX ADMIN — POTASSIUM CHLORIDE 10 MEQ: 10 INJECTION, SOLUTION INTRAVENOUS at 03:44

## 2018-06-25 RX ADMIN — DESMOPRESSIN ACETATE 2 MCG: 4 INJECTION, SOLUTION INTRAVENOUS; SUBCUTANEOUS at 07:22

## 2018-06-25 RX ADMIN — POTASSIUM CHLORIDE 10 MEQ: 10 INJECTION, SOLUTION INTRAVENOUS at 01:09

## 2018-06-25 RX ADMIN — ASPIRIN 325 MG: 325 TABLET ORAL at 22:48

## 2018-06-25 ASSESSMENT — ENCOUNTER SYMPTOMS
DIARRHEA: 0
LOSS OF CONSCIOUSNESS: 0
SPEECH CHANGE: 1
PHOTOPHOBIA: 0
FOCAL WEAKNESS: 0
NAUSEA: 0
WHEEZING: 0
FEVER: 0
COUGH: 0
HEADACHES: 0
ABDOMINAL PAIN: 0
CHILLS: 0
PALPITATIONS: 0
VOMITING: 0
SORE THROAT: 0
DIZZINESS: 0
TINGLING: 0
MYALGIAS: 0
DEPRESSION: 0
BACK PAIN: 0
SHORTNESS OF BREATH: 0

## 2018-06-25 ASSESSMENT — PATIENT HEALTH QUESTIONNAIRE - PHQ9
SUM OF ALL RESPONSES TO PHQ9 QUESTIONS 1 AND 2: 0
1. LITTLE INTEREST OR PLEASURE IN DOING THINGS: NOT AT ALL
2. FEELING DOWN, DEPRESSED, IRRITABLE, OR HOPELESS: NOT AT ALL

## 2018-06-25 ASSESSMENT — PAIN SCALES - GENERAL
PAINLEVEL_OUTOF10: 0

## 2018-06-25 ASSESSMENT — COPD QUESTIONNAIRES
DO YOU EVER COUGH UP ANY MUCUS OR PHLEGM?: NO/ONLY WITH OCCASIONAL COLDS OR INFECTIONS
COPD SCREENING SCORE: 2
HAVE YOU SMOKED AT LEAST 100 CIGARETTES IN YOUR ENTIRE LIFE: NO/DON'T KNOW
DURING THE PAST 4 WEEKS HOW MUCH DID YOU FEEL SHORT OF BREATH: NONE/LITTLE OF THE TIME

## 2018-06-25 ASSESSMENT — LIFESTYLE VARIABLES: EVER_SMOKED: NEVER

## 2018-06-25 NOTE — CONSULTS
Pulmonary & Critical Care Consult Note    DATE OF CONSULTATION:  6/24/2018     REFERRING PHYSICIAN:  Jono De Leon D.O.     CONSULTANT:  Aiden Perry DO     REASON FOR CONSULTATION:  CVA s/p TPA       HISTORY OF PRESENT ILLNESS: 59-year-old female past medical history of atrial fibrillation, hypokalemia, hypotension, syncope presented to the emergency department today after being observed by her daughter unresponsive and staring at approximately 8 PM, this was followed by an inability to speak clearly which persisted for several minutes and now is resolved however she still has occasional aphasia. Her noncontrasted CT numbers pertinent was negative, CTA head showed a filling defect in the proximal right M1 segment concerning for nonocclusive thrombus however her perfusion analysis revealed 0 mL of brain parenchyma that had less than 30% of cerebral blood flow. Her CT angiogram of her neck showed no flow-limiting lesions. Interventional radiology was consultation by Dr. De Leon and she was determined not to be a interventional candidate. She received TPA in the ER and is to be admitted the ICU for further care. Labs were pertinent for mild anemia with a hemoglobin of 11.8, hypernatremia with sodium of 124, potassium 3.4, chloride 90, glucose 157. At the time my evaluation the patient is writhing in bed complaining of abdominal pain that started recently, ultrasound of her abdomen demonstrates a severely distended bladder, she has been unable to spontaneously void and a Rosario will be placed.     PAST MEDICAL HISTORY:  Past Medical History:   Diagnosis Date   • A-fib (HCC)    • Hypokalemia    • Hypotension    • Syncope         PAST SURGICAL HISTORY:    Past Surgical History:   Procedure Laterality Date   • COLONOSCOPY     • ENDOSCOPY     • TONSILLECTOMY     • TUBAL COAGULATION LAPAROSCOPIC BILATERAL          ALLERGIES:    Other food and Penicillins     MEDICATIONS PRIOR TO ADMISSION:   No current  facility-administered medications on file prior to encounter.      Current Outpatient Prescriptions on File Prior to Encounter   Medication Sig Dispense Refill   • Multiple Vitamins-Minerals (OCUVITE PO) Take 1 Tab by mouth every day.     • POTASSIUM PO Take 1 Tab by mouth every day.     • ferrous sulfate 325 (65 FE) MG tablet Take 1 Tab by mouth every day. 30 Tab 0   • Omega-3 Fatty Acids (FISH OIL) 1000 MG Cap capsule Take 1,000 mg by mouth every day.     • calcium citrate (CALCITRATE) 950 MG Tab Take 950 mg by mouth every day.     • Zinc 50 MG Tab Take 50 mg by mouth every day.     • Vitamin D, Cholecalciferol, 1000 UNITS Cap Take 1,000 Units by mouth every day.     • aspirin EC (ECOTRIN) 81 MG Tablet Delayed Response Take 81 mg by mouth every day.         SOCIAL HISTORY:    Social History     Social History   • Marital status:      Spouse name: N/A   • Number of children: N/A   • Years of education: N/A     Occupational History   • Not on file.     Social History Main Topics   • Smoking status: Never Smoker   • Smokeless tobacco: Never Used   • Alcohol use No   • Drug use: No   • Sexual activity: Yes     Partners: Male     Other Topics Concern   • Not on file     Social History Narrative   • No narrative on file       FAMILY HISTORY:   Family History   Problem Relation Age of Onset   • Dementia Mother    • Cancer Sister      breast   • Other Brother      wegeners grandulomatosis   • Lung Disease Paternal Grandfather      TB. Lung removal        REVIEW OF SYSTEMS:    Constitutional: No fever, no chills,  Respiratory: No cough, no hemoptysis, no dyspnea  Cardiovascular: No chest pain, no palpitations, no orthopnea, no PND, no lower extremity swelling  GI: No nausea, no vomiting, no abdominal pain, no diarrhea, no constipation, no hematochezia, no melena  : no dysuria, +frequency, no urgency  Endocrine: No polyuria, no polydipsia, no hair loss  Hematology: No easy bruising, no bleeding  Musculoskeletal: No  "joint swelling, no joint erythema, no arthralgia, no myalgias  Neuro: + speach changes  Psychiatry: no depression, no suicidal ideation     PHYSICAL EXAMINATION:  /75   Pulse 62   Temp 37 °C (98.6 °F)   Resp (!) 31   Ht 1.702 m (5' 7\")   Wt 57.7 kg (127 lb 3.3 oz)   SpO2 95%   BMI 19.92 kg/m²   GENERAL:  Well-developed, well-nourished, age-appropriate appearing female  HEENT: Normocephalic, atraumatic, PERRL, EOMI, external ears normal, external nose normal, moist mucous membranes  NECK: No clubbing, cyanosis, no edema  PULM: Clear to auscultation bilaterally  CVS: Regular rate and rhythm  ABDOMEN: Soft, distended, suprapubic tenderness, bowel sounds present  EXTREMITIES: No clubbing, cyanosis, edema  SKIN: Warm, pink, dry  NEURO: Intermittently noted expressive aphasia however is able to identify 2 out of 3 objects. No lateralizing weakness.    LABORATORY DATA:    Lab Results   Component Value Date/Time    WBC 7.6 06/24/2018 08:55 PM    RBC 3.73 (L) 06/24/2018 08:55 PM    HEMOGLOBIN 11.8 (L) 06/24/2018 08:55 PM    HEMATOCRIT 33.4 (L) 06/24/2018 08:55 PM    MCV 89.5 06/24/2018 08:55 PM    MCH 31.6 06/24/2018 08:55 PM    MCHC 35.3 (H) 06/24/2018 08:55 PM    MPV 10.0 06/24/2018 08:55 PM    NEUTSPOLYS 70.00 06/24/2018 08:55 PM    LYMPHOCYTES 23.20 06/24/2018 08:55 PM    MONOCYTES 6.10 06/24/2018 08:55 PM    EOSINOPHILS 0.10 06/24/2018 08:55 PM    BASOPHILS 0.30 06/24/2018 08:55 PM      Lab Results   Component Value Date/Time    SODIUM 124 (L) 06/24/2018 08:55 PM    POTASSIUM 3.4 (L) 06/24/2018 08:55 PM    CHLORIDE 90 (L) 06/24/2018 08:55 PM    CO2 25 06/24/2018 08:55 PM    GLUCOSE 157 (H) 06/24/2018 08:55 PM    BUN 9 06/24/2018 08:55 PM    CREATININE 0.64 06/24/2018 08:55 PM      Lab Results   Component Value Date/Time    PROTHROMBTM 13.4 06/24/2018 08:55 PM    INR 1.05 06/24/2018 08:55 PM         IMAGING:   CXR (personally reviewed)  CT-CTA HEAD WITH & W/O-POST PROCESS   Final Result         1.  Filling " "defect in the proximal right M1 segment, appears to likely represent small nonocclusive thrombus.   2.  Otherwise CT angiogram of the [place] within normal limits.      These findings were discussed with the patient's clinician, Jono De Leon, on 6/24/2018 9:38 PM.      CT-CTA NECK WITH & W/O-POST PROCESSING   Final Result         1.  CT angiogram of the neck within normal limits.      CT-CEREBRAL PERFUSION ANALYSIS   Final Result         1.  CT perfusion examination over the limited section of brain reveals 0 mL of brain parenchyma has less than 30% of cerebral blood flow (CBF).      2.  Please note that the cerebral perfusion was performed on the limited brain tissue around the basal ganglia region. Infarct/ischemia outside the CT perfusion sections can be missed in this study.      CT-HEAD W/O   Final Result         1.  No acute intracranial abnormality is identified, there are changes of small vessel ischemic disease with mild atrophy noted.      DX-CHEST-LIMITED (1 VIEW)   Final Result         1.  No acute cardiopulmonary disease.   2.  Hyperexpansion of lungs favors changes of COPD.      Echocardiogram Comp W/O cont    (Results Pending)   MR-BRAIN-W/O    (Results Pending)      ASSESSMENT/PLAN:  CVA   - s/p TPA   - Admit to ICU, cardiac monitoring   - Neurology consult   - Neuro checks per protocol   - MRI, Echo pending   - seizure, aspiration, and fall precautions    - NPO pending dysphagia screen   - SLP/PT/OT evaluation   - lipid panel, HbA1c, Troponin, Coags, routine labs   - ASA/Heparin 24 hours post tPA, Atorvastatin   - Goal SBP <180 mmHg; hydralazine, labetalol nicardipine as needed   - DDx includes seizure given her initial symptoms were a \"staring\" spell, will order an EEG    Hyponatremia   - 124, will monitor closely   - Send urine osmole, serum osmole, urine sodium    History of paroxysmal atrial fibrillation   - ? Need for long-term anticoagulation   - Cardiac monitoring    Urinary " retention   - ? Etiology, not on any medications known to cause retention   - Place Rosario, void trial when able    Mild anemia   - No signs of bleeding, monitor    Hypokalemia   - Replace   - Check magnesium    Prophylaxis: SCDs    Patient is critically ill at this time.  I have spent 38 minutes examining this patient, all lab data, x-ray, and discussion with RN, RT, ED physician, hospitalist. Critical care time: 38 min. No time overlap. Procedures not included in time. Thank you for asking me to consult on the patient.  I appreciate the opportunity to assist in their care and will follow along closely with you.    Aiden Perry, DO  Critical Care Medicine    Please note that this dictation was created using voice recognition software. The accuracy of the dictation is limited to the abilities of the software.I have made every reasonable attempt to correct obvious errors, but I expect that there are errors of grammar and possibly content that I did not discover before finalizing the note.

## 2018-06-25 NOTE — PROGRESS NOTES
updated on fay urine output, 4L in 4hr. Orders for Urine sodium, urine osmolality and serum osmolality ordered. Pt has expressive aphasia with some topics such as current time, place event but recalls historic events such as her career, , and self. Pt is calm and pleasant, reorients easily. FAWN, No drift noted.

## 2018-06-25 NOTE — PROGRESS NOTES
"[facility] Hospitalist Progress Note    Date of Service: 2018    Chief Complaint  67 y.o. female admitted 2018 with dificulty with word finding.  Given tPA on presentation.  Of note pt was told in the past she had AFib however \"It just went away\" and is not currently on therapy.  On presentation Na 124.      PMHx AFib  Interval Problem Update  Feels her speech is back to normal.  No other complaints  TPA 2230   Sinus  -140  SLP to see  AFebrile  UOP 5 litres/12hrs      Consultants/Specialty  Pulmonology  Neurology    Disposition  Cont in ICU until out of tPA window        Review of Systems   Constitutional: Negative for chills and fever.   Respiratory: Negative for cough and shortness of breath.    Cardiovascular: Negative for chest pain.   Gastrointestinal: Negative for abdominal pain, diarrhea, nausea and vomiting.   Musculoskeletal: Negative for back pain.   Skin: Negative for rash.   Neurological: Negative for dizziness, loss of consciousness and headaches.      Physical Exam  Laboratory/Imaging   Hemodynamics  Temp (24hrs), Av.1 °C (98.7 °F), Min:36.5 °C (97.7 °F), Max:37.5 °C (99.5 °F)   Temperature: 37.5 °C (99.5 °F), Monitored Temp: 37.5 °C (99.5 °F)  Pulse  Av.4  Min: 58  Max: 82 Heart Rate (Monitored): 64  Blood Pressure : 155/75, NIBP: 133/69      Respiratory      Respiration: (!) 21, Pulse Oximetry: 97 %, O2 Daily Delivery Respiratory : Room Air with O2 Available             Fluids    Intake/Output Summary (Last 24 hours) at 18 0545  Last data filed at 18 0400   Gross per 24 hour   Intake              500 ml   Output             3550 ml   Net            -3050 ml       Nutrition  Orders Placed This Encounter   Procedures   • Diet NPO     Standing Status:   Standing     Number of Occurrences:   1     Order Specific Question:   Restrict to:     Answer:   Strict [1]     Physical Exam   Constitutional: She is oriented to person, place, and time. She appears well-developed " and well-nourished. No distress.   HENT:   Head: Normocephalic and atraumatic.   Neck: No JVD present.   Cardiovascular: Normal rate and regular rhythm.    Pulmonary/Chest: Effort normal. No stridor. No respiratory distress. She has no wheezes. She has no rales.   Abdominal: Soft. There is no tenderness. There is no rebound and no guarding.   Musculoskeletal: She exhibits no edema.   Neurological: She is oriented to person, place, and time.   Skin: Skin is warm and dry. No rash noted. She is not diaphoretic.   Psychiatric: She has a normal mood and affect. Thought content normal.       Recent Labs      06/24/18 2055 06/25/18 0415   WBC  7.6  8.4   RBC  3.73*  3.96*   HEMOGLOBIN  11.8*  12.5   HEMATOCRIT  33.4*  35.9*   MCV  89.5  90.7   MCH  31.6  31.6   MCHC  35.3*  34.8   RDW  40.5  41.4   PLATELETCT  258  279   MPV  10.0  10.2     Recent Labs      06/24/18 2055 06/25/18 0415   SODIUM  124*  138   POTASSIUM  3.4*  3.8   CHLORIDE  90*  104   CO2  25  27   GLUCOSE  157*  109*   BUN  9  8   CREATININE  0.64  0.66   CALCIUM  9.1  8.9     Recent Labs      06/24/18 2055   INR  1.05         Recent Labs      06/24/18 2055   TRIGLYCERIDE  45   HDL  90   LDL  77          Assessment/Plan     * CVA (cerebral vascular accident) (HCC)   Assessment & Plan    Neurologically appears to have recovered completely  S/p tPA  Questionable Hx of AFib however sinus here  CHADS/VASC score 2 in a very active pt; would not recommend AC regardless  Echo pending  Statin  ASA  Start more agressive BP control tomorrow  Has passed SLP eval        Hyponatremia   Assessment & Plan    Urine OSM 69  Pt denies idalmis water intake  Giving DDAVP as has over corrected  Cont to follow q6 BMP        Hyperglycemia   Assessment & Plan    A1c 5.7          Quality-Core Measures   Reviewed items::  EKG reviewed, Labs reviewed, Medications reviewed and Radiology images reviewed  DVT prophylaxis - mechanical:  SCDs  Ulcer Prophylaxis::  Not  indicated

## 2018-06-25 NOTE — ASSESSMENT & PLAN NOTE
Patient presented with aphasia  Symptoms resolved after TPA  CTA did show nonocclusive thrombus in the right M1 segment  MRI does not demonstrate any area of acute infarction  ?seizure or TIA  Medical management for TIA is appropriate

## 2018-06-25 NOTE — THERAPY
"  Speech Language Therapy Clinical Swallow Evaluation completed.  Functional Status: Patient seen for swallowing evaluation this date with daughter at bedside.  Patient awake, alert and oriented in all spheres. Daughter reporting that language deficits seem to have resolved.  Oral motor examination was noted to be grossly intact with no deficits appreciated.  Presentation of PO consisted of ice, nectars (2 ounces), thin liquids (12 ounces), purees (container of pudding and container of applesauce), soft solids (container of fruit cocktail) and solids (crackers).  Patient was able to consume all consistencies without any overt S/Sx of aspiration noted.  At this time, would recommend initiation of regular diet with thin liquids.  SLP will follow up X1 to ensure tolerance.  SLP will also follow for   comprehensive speech/language/cognitive evaluation. Although daughter reports that overall language skills have returned, patient has been having difficulty with forming new memories for ~3 years now.  Thank you for the consult.     Recommendations - Diet:  Regular diet with thin liquids                          Strategies: Head of Bed at 90 Degrees                          Medication Administration:  As tolerated   Plan of Care: SLP will follow X1 for swallowing unless there is a change in function warranting further intervention   Post-Acute Therapy: Do not anticipate any further SLP services for DYSPHAGIA following DC from acute care.  Will see how speech/language/cognitive evaluation goes to determine if there are any further SLP needs    See \"Rehab Therapy-Acute\" Patient Summary Report for complete documentation.     "

## 2018-06-25 NOTE — PROGRESS NOTES
Critical Care Progress Note        Chief Complaint: Altered mental status    History of Present Illness: 67 y.o. female who presented evening of 6/24 w/ above chief complaints.  Symptoms started 2 hours prior to ED triage. Head CT w/o hemorrhage.  CTA showed right M1 segment filling defect; received tPA 20:35 6/24 and admitted to ICU.    ROS:  Respiratory: negative, Cardiac: negative, GI: negative.  All other systems negative.    Interval Events:  -Had polyuria of 4L in 4 hr; urine osmolality 69; received desmopressin  -Morning Na+ improved; on q6H Na+ checks     PFSH:  No change.    Respiratory:     Pulse Oximetry: 97 %  Chest Tube Drains:          Exam: unlabored respirations, no intercostal retractions or accessory muscle use and clear to auscultation without rales or wheezes  ImagingNone - Reviewed     HemoDynamics:  Pulse: 61, Heart Rate (Monitored): 62  Blood Pressure : 155/75, NIBP: 124/60       Exam: S1S2 regular rate and rhythm; no murmurs  Imaging: None - Reviewed  Recent Labs      06/24/18 2055   TROPONINI  <0.01       Neuro:  GCS Total Valerie Coma Score: 15       Exam: L hand  strength 5-/5 as well and L foor plantarflexion; speech fluid; attention span normal; RASS 0; orients and follows commands; does have some difficulty finding words  Imaging: Available data reviewed    Fluids:  Intake/Output       06/23/18 0700 - 06/24/18 0659 (Not Admitted) 06/24/18 0700 - 06/25/18 0659 06/25/18 0700 - 06/26/18 0659      0700-1859 4424-6687 Total 0354-0575 9883-7130 Total 8957-5856 1307-2759 Total       Intake    P.O.  --  -- --  --  0 0  --  -- --    P.O. -- -- -- -- 0 0 -- -- --    I.V.  --  -- --  --  700 700  --  -- --    IV Piggyback Volume (POtassium Chloride) -- -- -- -- 200 200 -- -- --    IV Volume (ns) -- -- -- -- 500 500 -- -- --    Total Intake -- -- -- -- 700 700 -- -- --       Output    Urine  --  -- --  --  4650 4650  --  -- --    Output (mL) (Urinary Catheter Indwelling Catheter fr 16) -- --  -- -- 4650 -- -- --    Total Output -- -- -- -- 4650 -- -- --       Net I/O     -- -- -- -- -6560 -9460 -- -- --        Weight: 57.7 kg (127 lb 3.3 oz)  Recent Labs      18   SODIUM  124*  138   POTASSIUM  3.4*  3.8   CHLORIDE  90*  104   CO2  25  27   BUN  9  8   CREATININE  0.64  0.66   CALCIUM  9.1  8.9       GI/Nutrition:  Exam: soft, nontender, nondistended, no hepatosplenomegaly  Imaging: None - Reviewed  NPO  Liver Function  Recent Labs      18   ALTSGPT  33  29   ASTSGOT  32  24   ALKPHOSPHAT  67  61   TBILIRUBIN  0.8  0.9   GLUCOSE  157*  109*       Heme:  Recent Labs      18   RBC  3.73*  3.96*   HEMOGLOBIN  11.8*  12.5   HEMATOCRIT  33.4*  35.9*   PLATELETCT  258  279   PROTHROMBTM  13.4   --    INR  1.05   --        Infectious Disease:  Monitored Temp 2  Av.3 °C (99.1 °F)  Min: 37 °C (98.6 °F)  Max: 37.7 °C (99.9 °F)  Temp  Av.1 °C (98.8 °F)  Min: 36.5 °C (97.7 °F)  Max: 37.7 °C (99.8 °F)  Micro: none  Recent Labs      18   WBC  7.6  8.4   NEUTSPOLYS  70.00  72.10*   LYMPHOCYTES  23.20  20.30*   MONOCYTES  6.10  6.90   EOSINOPHILS  0.10  0.00   BASOPHILS  0.30  0.50   ASTSGOT  32  24   ALTSGPT  33  29   ALKPHOSPHAT  67  61   TBILIRUBIN  0.8  0.9     Current Facility-Administered Medications   Medication Dose Frequency Provider Last Rate Last Dose   • NS infusion 50 mL  50 mL Once Jono De Leon D.O.   Stopped at 18 0112   • senna-docusate (PERICOLACE or SENOKOT S) 8.6-50 MG per tablet 2 Tab  2 Tab BID Fina T. Galicia, M.D.   Stopped at 18 0100    And   • polyethylene glycol/lytes (MIRALAX) PACKET 1 Packet  1 Packet QDAY PRN Fina Galicia M.D.        And   • magnesium hydroxide (MILK OF MAGNESIA) suspension 30 mL  30 mL QDAY PRN Fina aGlicia M.D.        And   • bisacodyl (DULCOLAX) suppository 10 mg  10 mg QDAY PRN Fina Galicia M.D.       •  Respiratory Care per Protocol   Continuous RT Fina Galicia M.D.       • labetalol (NORMODYNE,TRANDATE) injection 10 mg  10 mg Q4HRS PRN Fina Galicia M.D.        Or   • hydrALAZINE (APRESOLINE) injection 10 mg  10 mg Q2HRS PRN Fina Galicia M.D.       • aspirin (ASA) tablet 325 mg  325 mg DAILY Fina Galicia M.D.        Or   • aspirin (ASA) chewable tab 324 mg  324 mg DAILY Fina Galicia M.D.        Or   • aspirin (ASA) suppository 300 mg  300 mg DAILY Fian Galicia M.D.         Last reviewed on 6/24/2018  9:14 PM by Tristen Rendon    Quality  Measures:  Labs reviewed and Medications reviewed  Rosario catheter: Critically Ill - Requiring Accurate Measurement of Urinary Output      DVT Prophylaxis: Contraindicated - High bleeding risk  DVT prophylaxis - mechanical: SCDs      Assessed for rehab: Patient was assess for and/or received rehabilitation services during this hospitalization        Assessment / Plan    CVA due to proximal right M1 segment occlusion; s/p tPA 20:35 6/24  -Continue ischemic stroke protocol  -LDL was 77  -Troponin was (-)  -Continue ASA  -Awaiting EEG given seizure possibility     Hyponatremia--resolved.  TSH OK.  -Follow  -Check AM cortisol    History of paroxysmal atrial fibrillation  - ? Need for long-term anticoagulation  - Cardiac monitoring     Polyuria s/p ddAVP  -Monitor urine output  -Monitor Na+

## 2018-06-25 NOTE — ED TRIAGE NOTES
Azul Milan  67 y.o.  Chief Complaint   Patient presents with   • Altered Mental Status     starting around 2000     Ambulatory to triage with steady gait for above.    Per family at 2000 patient was sitting outside and suddenly became unresponsive, seemingly staring off into space, unresponsive to verbal or tactile stimuli. Episode lasted for about 1 minute. After patient was speaking with slightly slurred speech, not making sense, word salad per family.    Hx. afib on SA 81 mg PO daily    NIHSS 0 in triage. All symptoms resolved PTA per family.    Charge RN Naf notified of patient.    Patient roomed immediately to [facility] via wheelchair. Report given at bedside to LIYA Rainey.

## 2018-06-25 NOTE — CONSULTS
Teleneurology Consultation Note        Patient Information  Patient name:      Azul Milan  MRN:         [id number]  :         1950  Date of Service: 2018    Facility: [facility]    Reason for Consult: acute stroke    Gender: female    Age: 67 y.o.       Patient History    Last known well: Known     History of Present Illness: episode of staring at the daughter around , briefly unresponsive then with speech disturbance which resolved after a few minutes, subsequently returned and presently not back to baseline    Allergies:  Allergies   Allergen Reactions   • Other Food      Corn   • Penicillins Rash     Minor RASH       Hospital Medications:  No current facility-administered medications for this encounter.     Current Outpatient Prescriptions:   •  B Complex Vitamins (B COMPLEX 1 PO), Take  by mouth., Disp: , Rfl:   •  magnesium gluconate (MAG-G) 500 MG tablet, Take 500 mg by mouth 3 times a day., Disp: , Rfl:   •  Multiple Vitamins-Minerals (OCUVITE PO), Take 1 Tab by mouth every day., Disp: , Rfl:   •  POTASSIUM PO, Take 1 Tab by mouth every day., Disp: , Rfl:   •  ferrous sulfate 325 (65 FE) MG tablet, Take 1 Tab by mouth every day., Disp: 30 Tab, Rfl: 0  •  Omega-3 Fatty Acids (FISH OIL) 1000 MG Cap capsule, Take 1,000 mg by mouth every day., Disp: , Rfl:   •  calcium citrate (CALCITRATE) 950 MG Tab, Take 950 mg by mouth every day., Disp: , Rfl:   •  Zinc 50 MG Tab, Take 50 mg by mouth every day., Disp: , Rfl:   •  Vitamin D, Cholecalciferol, 1000 UNITS Cap, Take 1,000 Units by mouth every day., Disp: , Rfl:   •  aspirin EC (ECOTRIN) 81 MG Tablet Delayed Response, Take 81 mg by mouth every day., Disp: , Rfl:     Current Outpatient Medications:    (Not in a hospital admission)    Physical Exam:    Awake and alert, oriented to person and place; expressive aphasia; follows commands; right gaze preference  EOMI, no facial asymmetry  Moves RUE, RLE, LLE  antigravity without drift, LUE antigravity with drift    NIH Stroke Scale:    1a. Level of Consciousness (Alert, drowsy, etc): 0= Alert    1b. LOC Questions (Month, age): 1= Answers one correctly    1c. LOC Commands (Open/close eyes make fist/let go): 0= Obeys both correctly    2.   Best Gaze (Eyes open - patient follows examiner's finger on face): 1= Partial gaze palay    3.   Visual Fields (introduce visual stimulus/threat to patient's field quadrants): 0= No visual loss  4.   Facial Paresis (Show teeth, raise eyebrows and squeeze eyes shut): 0= Normal     5a. Motor Arm - Left (Elevate arm to 90 degrees if patient is sitting, 45 degrees if  supine): 1= Drift    5b. Motor Arm - Right (Elevate arm to 90 degrees if patient is sitting, 45 degrees if supine): 0= No drift    6a. Motor Leg - Left (Elevate leg 30 degrees with patient supine): 0= No drift    6b. Motor Leg - Right  (Elevate leg 30 degrees with patient supine): 0= No drift    7.   Limb Ataxia (Finger-nose, heel down shin): 0= No ataxia    8.   Sensory (Pin prick to face, arm, trunk and leg - compare side to side): 0= Normal    9.  Best Language (Name item, describe a picture and read sentences): 2= Severe aphasia    10. Dysarthria (Evaluate speech clarity by patient repeating listed words): 1= Mild to moderate slurring    11. Extinction and Inattention (Use information from prior testing to identify neglect or  double simultaneous stimuli testing): 1= Partial neglect    Total NIH Score: 7    Imaging: CTH: No acute intracranial abnormality;  CTA: filling defect in the proximal right M1 segment, appears to likely represent small nonocclusive thrombus    Laboratory:   Recent Labs      06/24/18 2055   WBC  7.6   RBC  3.73*   HEMOGLOBIN  11.8*   HEMATOCRIT  33.4*   MCV  89.5   MCH  31.6   MCHC  35.3*   RDW  40.5   PLATELETCT  258   MPV  10.0     Recent Labs      06/24/18 2055   INR  1.05       Patient is a TPA candidate: yes    Patient may be an IR candidate:  yes    Diagnosis: CVA    Recommendation: IV tPA (risks/benefits d/w patient and daughter); Neurointerventional consult; Brain MRI    Eulalio Iqbal M.D.    Date: __6/24/2018_____  [facility]:    This consultation was conducted utilizing secure and encrypted videoconferencing equipment with the assistance of a trained tele-presenter at the originating site.

## 2018-06-25 NOTE — CARE PLAN
Problem: Acute Care of the Stroke Patient  Goal: Optimal Outcome for the Stroke patient    Intervention: NIHSS completed and documented  NIHSS completed.  Intervention: Telemetry monitoring (discontinue after 24 hours unless contraindicated. If monitoring required beyond 24 hours obtain order for monitored bed)  Pt in tele monitoring in ICU post alteplase.

## 2018-06-25 NOTE — CARE PLAN
Problem: Safety  Goal: Free from accidental injury    Intervention: Initiate Safety Measures  Bed in low, locked position, near nursing station, call light within reach. Bed alarm activated, appropriate fall identifiers in place. Bedrest until 2235 on 6/25/18 per post-alteplase protocol.      Problem: Pain  Goal: Alleviation of Pain or a reduction in pain to the patient's comfort goal    Intervention: Pain Management--Non Pharmacologic techniques.  Examples: Relaxation, Distraction, Massage, Cold or Heat Therapy  Calm, soothing environment provided. Patient demonstrates ability for meditation through personal iPhone monica. Pt visibly calm and relaxed.

## 2018-06-25 NOTE — ED NOTES
Tele stroke done. Per Neurologist will proceed to alteplase administration. ERP aware. Per Neurologist patient need an interventionalist consult.

## 2018-06-25 NOTE — CARE PLAN
Problem: Knowledge Deficit  Goal: Knowledge of disease process/condition, treatment plan, diagnostic tests, and medications will improve    Intervention: Assess knowledge level of disease process/condition, treatment plan, diagnostic tests, and medications  Stoke book given to pt and .

## 2018-06-25 NOTE — H&P
" Hospital Medicine History and Physical    Date of Service  6/24/2018    Chief Complaint  Garbled speech and difficulty finding the correct words    History of Presenting Illness  67 y.o. female who presented on 6/24/2018 speech changes. At approximately 8 PM last night, the patient had been sitting at a table in speaking with her  and daughter when they suddenly noted that she momentarily lost the capacity for speech and minutes later had return of speech but that it was \"jumbled\". The patient had near resolution of her speech changes by the time of my arrival at her bedside although she still has occasional expressive aphasia. She confirms this history given by her  who is at bedside. All day today, she has had decreased energy and poor appetite. She also reports waking up at 3:00 in the morning and being unable to go back to sleep which is unusual for her. She also reports increased urinating all day long without any dysuria or sensation of urgency. Prior to this, she was in her usual state of health and denies any fevers, chills, nausea, vomiting, chest pain, shortness of breath, diarrhea or dysuria. Family members at bedside report that the patient's speech has improved greatly since completion of the TPA. Of note, the patient states that she was diagnosed with atrial fibrillation in the past but was never on any treatment including anticoagulation or rate control because it \"just went away\".        Primary Care Physician  Rachelle Isaac M.D.    Consultants  Dr. Perry, pulmonary medicine  Tele nneurology    Code Status  Full    Review of Systems  Review of Systems   Constitutional: Positive for malaise/fatigue. Negative for chills and fever.   HENT: Negative for congestion and sore throat.    Eyes: Negative for photophobia.   Respiratory: Negative for cough, shortness of breath and wheezing.    Cardiovascular: Negative for chest pain and palpitations.   Gastrointestinal: Negative for abdominal " pain, diarrhea, nausea and vomiting.   Genitourinary: Positive for frequency. Negative for dysuria.   Musculoskeletal: Negative for myalgias.   Skin: Negative.    Neurological: Positive for speech change. Negative for dizziness, tingling, focal weakness and headaches.   Psychiatric/Behavioral: Negative for depression and suicidal ideas.        Past Medical History  Past Medical History:   Diagnosis Date   • A-fib (HCC)    • Hypokalemia    • Hypotension    • Syncope        Surgical History  Past Surgical History:   Procedure Laterality Date   • COLONOSCOPY     • ENDOSCOPY     • TONSILLECTOMY     • TUBAL COAGULATION LAPAROSCOPIC BILATERAL         Medications  No current facility-administered medications on file prior to encounter.      Current Outpatient Prescriptions on File Prior to Encounter   Medication Sig Dispense Refill   • Multiple Vitamins-Minerals (OCUVITE PO) Take 1 Tab by mouth every day.     • POTASSIUM PO Take 1 Tab by mouth every day.     • ferrous sulfate 325 (65 FE) MG tablet Take 1 Tab by mouth every day. 30 Tab 0   • Omega-3 Fatty Acids (FISH OIL) 1000 MG Cap capsule Take 1,000 mg by mouth every day.     • calcium citrate (CALCITRATE) 950 MG Tab Take 950 mg by mouth every day.     • Zinc 50 MG Tab Take 50 mg by mouth every day.     • Vitamin D, Cholecalciferol, 1000 UNITS Cap Take 1,000 Units by mouth every day.     • aspirin EC (ECOTRIN) 81 MG Tablet Delayed Response Take 81 mg by mouth every day.         Family History  Family History   Problem Relation Age of Onset   • Dementia Mother    • Cancer Sister      breast   • Other Brother      wegeners grandulomatosis   • Lung Disease Paternal Grandfather      TB. Lung removal       Social History  Social History   Substance Use Topics   • Smoking status: Never Smoker   • Smokeless tobacco: Never Used   • Alcohol use No       Allergies  Allergies   Allergen Reactions   • Other Food      Corn   • Penicillins Rash     Minor RASH        Physical Exam   Laboratory   Hemodynamics  Temp (24hrs), Av.5 °C (97.7 °F), Min:36.5 °C (97.7 °F), Max:36.5 °C (97.7 °F)   Temperature: 36.5 °C (97.7 °F)  Pulse  Av.8  Min: 62  Max: 73 Heart Rate (Monitored): 62  Blood Pressure : 155/75, NIBP: 148/77      Respiratory      Respiration: 15, Pulse Oximetry: 97 %             Physical Exam   Constitutional: She is oriented to person, place, and time. No distress.   HENT:   Head: Normocephalic and atraumatic.   Right Ear: External ear normal.   Left Ear: External ear normal.   Eyes: EOM are normal. Right eye exhibits no discharge. Left eye exhibits no discharge.   Neck: Neck supple. No JVD present.   Cardiovascular: Normal rate, regular rhythm and normal heart sounds.    Pulmonary/Chest: Effort normal and breath sounds normal. No respiratory distress. She exhibits no tenderness.   Abdominal: Soft. Bowel sounds are normal. She exhibits no distension. There is no tenderness.   Musculoskeletal: Normal range of motion. She exhibits no edema.   Neurological: She is alert and oriented to person, place, and time. No cranial nerve deficit.   Mild expressive aphasia, no other focal deficits noted   Skin: Skin is warm and dry. She is not diaphoretic. No erythema.   Psychiatric: She has a normal mood and affect. Her behavior is normal.   Nursing note and vitals reviewed.    Capillary refill less than 3 seconds, distal pulses intact    Recent Labs      18   WBC  7.6   RBC  3.73*   HEMOGLOBIN  11.8*   HEMATOCRIT  33.4*   MCV  89.5   MCH  31.6   MCHC  35.3*   RDW  40.5   PLATELETCT  258   MPV  10.0     Recent Labs      18   SODIUM  124*   POTASSIUM  3.4*   CHLORIDE  90*   CO2  25   GLUCOSE  157*   BUN  9   CREATININE  0.64   CALCIUM  9.1     Recent Labs      18   ALTSGPT  33   ASTSGOT  32   ALKPHOSPHAT  67   TBILIRUBIN  0.8   GLUCOSE  157*     Recent Labs      18   INR  1.05             Lab Results   Component Value Date    TROPONINI <0.01  06/24/2018       Imaging  Ct-cta Head With & W/o-post Process    Result Date: 6/24/2018 6/24/2018 9:13 PM HISTORY/REASON FOR EXAM:  Altered mental status, unresponsiveness TECHNIQUE/EXAM DESCRIPTION: CT angiogram of the [place] without and with contrast.  Initial precontrast images were obtained of the head from the skull base through the vertex.  Postcontrast images were obtained of the [place] following the power injection of nonionic contrast at 5.0 mL/sec. Thin-section helical images were obtained with overlapping reconstruction interval. Coronal and sagittal multiplanar volume reformats were generated.  3D angiographic images were reviewed on PACS.  Maximum intensity projection (MIP) images were generated and reviewed. 80 mL of Omnipaque 350 nonionic contrast was injected intravenously. Low dose optimization technique was utilized for this CT exam including automated exposure control and adjustment of the mA and/or kV according to patient size. COMPARISON:  None. FINDINGS: The posterior circulation shows the distal vertebral arteries to be patent. The vertebrobasilar confluence is intact. The basilar artery is patent. No aneurysm or occlusive lesion is evident. There is small filling defect seen in the right M1 segment proximally, otherwise the anterior circulation shows no stenotic or occlusive lesion. No aneurysm is evident about the [place]. The brain is unremarkable.     1.  Filling defect in the proximal right M1 segment, appears to likely represent small nonocclusive thrombus. 2.  Otherwise CT angiogram of the [place] within normal limits. These findings were discussed with the patient's clinician, Jono De Leon, on 6/24/2018 9:38 PM.    Ct-cta Neck With & W/o-post Processing    Result Date: 6/24/2018 6/24/2018 9:13 PM HISTORY/REASON FOR EXAM:  Stroke symptoms TECHNIQUE/EXAM DESCRIPTION: CT angiogram of the neck with contrast. Postcontrast images were obtained of  the neck from the great vessels through the skull base following the power injection of nonionic contrast at 5.0 mL/sec. Thin-section helical images were obtained with overlapping reconstruction interval. Coronal and oblique multiplanar volume reformats were generated. Cervical internal carotid artery percent stenosis is calculated using the standard method according to the NASCET criteria wherein a segment of uniform caliber mid or distal cervical internal carotid is used as the reference denominator. 3D angiographic images were reviewed on PACS.  Maximum intensity projection (MIP) images were generated and reviewed 80 mL of Omnipaque 350 nonionic contrast was injected intravenously. Low dose optimization technique was utilized for this CT exam including automated exposure control and adjustment of the mA and/or kV according to patient size. COMPARISON:  None. FINDINGS: The arch origins of the great vessels appear intact. The aortic arch shows no abnormality. The right common carotid artery, cervical carotid bifurcation, and cervical internal carotid artery are within normal limits. There is no evidence of significant stenosis, thrombus, or other filling defect or ulceration. There is no evidence of dissection or aneurysm. The left common carotid artery, cervical carotid bifurcation, and cervical internal carotid artery are within normal limits. There is no evidence of significant stenosis, thrombus, or other filling defect or ulceration. There is no evidence of dissection  or aneurysm. The cervical vertebral arteries are normal bilaterally. The neck soft tissues and lung apices in the field of view are unremarkable. See dedicated CT angiogram of the head for intracranial findings     1.  CT angiogram of the neck within normal limits.    Ct-head W/o    Result Date: 6/24/2018 6/24/2018 9:13 PM HISTORY/REASON FOR EXAM: Altered Mental Status TECHNIQUE/EXAM DESCRIPTION:  CT of the head without contrast. Sequential  axial images were obtained from the vertex to the skull base without contrast. Up to date radiation dose reduction adjustments have been utilized to meet ALARA standards for radiation dose reduction. COMPARISON:  July 9, 2017 FINDINGS: There is mild diffuse parenchymal volume loss observed. Periventricular and subcortical white matter low-attenuation changes are seen, most commonly associated with small vessel ischemic disease. There is mild bilateral symmetric ventricular dilatation seen, with appearance likely representing ex vacuo dilatation. No space occupying lesions or areas of acute vascular territory infarctions are identified. There are no abnormal extra axial fluid collections or extra axial hemorrhage identified. The visualized paranasal sinuses and mastoid air cells are well aerated bilaterally. No depressed calvarial fractures are identified. The visualized globes and retrobulbar soft tissues appear within normal limits.     1.  No acute intracranial abnormality is identified, there are changes of small vessel ischemic disease with mild atrophy noted.    Dx-chest-limited (1 View)    Result Date: 6/24/2018 6/24/2018 8:57 PM HISTORY/REASON FOR EXAM:  Altered level of consciousness TECHNIQUE/EXAM DESCRIPTION:  Single AP view of the chest. COMPARISON: June 9, 2017 FINDINGS: Overlying cardiac leads are present. The cardiac silhouette appears within normal limits. The mediastinal contour appears within normal limits.  The central pulmonary vasculature appears normal. The lungs appear hyperexpanded bilaterally with flattening of the diaphragms.  Bilateral lungs are clear. No significant pleural effusions are identified. The bony structures appear age-appropriate.     1.  No acute cardiopulmonary disease. 2.  Hyperexpansion of lungs favors changes of COPD.    Ct-cerebral Perfusion Analysis    Result Date: 6/24/2018 6/24/2018 9:13 PM HISTORY/REASON FOR EXAM: Sudden onset unresponsiveness TECHNIQUE/EXAM  DESCRIPTION AND NUMBER OF VIEWS: CT Cerebral Perfusion Analysis. The study was performed on a 128 slice [employer] Lightspeed Multidetector CT scanner. Perfusion data and corresponding time-activity curves are processed and displayed as color-coded maps in the axial plane for Cerebral Blood Flow (CBF), Cerebral Blood Volume  (CBV), T Max and Mean Transit Time (MTT) and are post processed on the Ischemia view-RAPID virtual . 40 mL of Omnipaque 350 nonionic contrast was injected intravenously. Low dose optimization technique was utilized for this CT exam including automated exposure control and adjustment of the mA and/or kV according to patient size. COMPARISON:  None. FINDINGS: CT perfusion examination over the limited sections of brain reveal that 0 mL of brain parenchyma has less than 30% of cerebral blood flow (CBF < 30%).     1.  CT perfusion examination over the limited section of brain reveals 0 mL of brain parenchyma has less than 30% of cerebral blood flow (CBF). 2.  Please note that the cerebral perfusion was performed on the limited brain tissue around the basal ganglia region. Infarct/ischemia outside the CT perfusion sections can be missed in this study.     Assessment/Plan     Anticipate that patient will need greater than 2 midnights for management of the discussed medical issues.    * CVA (cerebral vascular accident) (HCC)   Assessment & Plan    Patient is now status post-TPA given at 20:35 on June 24th, expressive aphasia has begun to improve. She will be admitted to the ICU for continuous neurology checks. No anticoagulation for the 1st 24 hours. Start statin once passes swallow evaluation. Check an MRI and echocardiogram. Neurology was consulted by the emergency room physician and I appreciate their recommendations. We will allow permissive hypertension overnight. Reportedly has a history of atrial fibrillation but it was not treated in the past as it had spontaneously resolved. Will continue to  monitor on telemetry, this may be her underlying etiology. PT, OT, and speech therapy pending.        Hyponatremia   Assessment & Plan    Check serum and urine osmolality levels, start IV fluids and trend chemistries.        Hyperglycemia   Assessment & Plan    This is a nonfasting sample, check HbA1c and begin hypoglycemics if indicated.          Prophylaxis: Sequential compression devices for DVT prophylaxis, no PPI indicated, bowel protocol as needed    I spent a total of 35 minutes of critical care time during this clinical encounter of which > 50% was devoted to counseling where able and coordinating care including review of records, pertinent lab data and studies, as well as discussing diagnostic evaluation and work up, planned therapeutic interventions and future disposition of care. Where indicated, the assessment and plan reflect discussion of patient with consultants, other healthcare providers, family members, and additional research needed to obtain further information in formulating the plan of care of this patient. This time includes no procedures or overlap with other providers.

## 2018-06-25 NOTE — ED PROVIDER NOTES
"ED Provider Note    CHIEF COMPLAINT  Chief Complaint   Patient presents with   • Altered Mental Status     starting around 2000       HPI  Azul Milan is a 67 y.o. female here for evaluation of TIA versus stroke. The patient was sitting earlier this evening at home, approximately 2 hours ago, and was normal per the daughter. She then had episode of staring at the daughter, but being unresponsive. When she tried to speak she had a garbled speech, and then this resolved after a few minutes. The daughter states her mom has not been \"acting right since then.\" She has no headache, no vomiting, no fever, and does not take any anticoagulants. She does have a history of atrial fibrillation, and this has been under control. She denies any history of this in the past. Nothing alleviates or exacerbates her symptoms, and they have been since their onset.    PAST MEDICAL HISTORY   has a past medical history of A-fib (HCC); Hypokalemia; Hypotension; and Syncope.    SOCIAL HISTORY  Social History     Social History Main Topics   • Smoking status: Never Smoker   • Smokeless tobacco: Never Used   • Alcohol use No   • Drug use: No   • Sexual activity: Yes     Partners: Male     Family history:  No bleeding disorders.       SURGICAL HISTORY   has a past surgical history that includes tonsillectomy; tubal coagulation laparoscopic bilateral; colonoscopy; and endoscopy.    CURRENT MEDICATIONS  Home Medications     Reviewed by Grace Moulton R.N. (Registered Nurse) on 06/24/18 at 2031  Med List Status: Complete   Medication Last Dose Status   aspirin EC (ECOTRIN) 81 MG Tablet Delayed Response 6/24/2018 Active   B Complex Vitamins (B COMPLEX 1 PO) 6/24/2018 Active   calcium citrate (CALCITRATE) 950 MG Tab 6/24/2018 Active   ferrous sulfate 325 (65 FE) MG tablet 6/24/2018 Active   IRON PO 6/24/2018 Active   magnesium gluconate (MAG-G) 500 MG tablet 6/24/2018 Active   Multiple Vitamins-Minerals (OCUVITE PO) 6/24/2018 Active "   Omega-3 Fatty Acids (FISH OIL) 1000 MG Cap capsule 6/24/2018 Active   POTASSIUM PO 6/24/2018 Active   Vitamin D, Cholecalciferol, 1000 UNITS Cap 6/24/2018 Active   Zinc 50 MG Tab 6/24/2018 Active                ALLERGIES  Allergies   Allergen Reactions   • Other Food      Corn   • Penicillins Rash     Minor RASH       REVIEW OF SYSTEMS  See HPI for further details. Review of systems as above, otherwise all other systems are negative.     PHYSICAL EXAM  Constitutional: Well developed, well nourished. No acute distress.  HEENT: Normocephalic, atraumatic. Posterior pharynx clear and moist.  Eyes:  EOMI. Normal sclera.  Neck: Supple, Full range of motion, nontender.  Chest/Pulmonary: clear to ausculation. Symmetrical expansion.   Cardio: Regular rate and rhythm with no murmur.   Abdomen: Soft, nontender. No peritoneal signs. No guarding. No palpable masses.  Musculoskeletal: No deformity, no edema, neurovascular intact.   Neuro: garbled speech, appropriate, cooperative, cranial nerves II-XII grossly intact.  Left  4/5, right  5/5, good finger to nose, dorsi plant flex ext 4/5 on the left, 5/5 on the right.  Psych: Normal mood and affect    CT-CTA HEAD WITH & W/O-POST PROCESS   Final Result         1.  Filling defect in the proximal right M1 segment, appears to likely represent small nonocclusive thrombus.   2.  Otherwise CT angiogram of the [place] within normal limits.      These findings were discussed with the patient's clinician, Jono De Leon, on 6/24/2018 9:38 PM.      CT-CTA NECK WITH & W/O-POST PROCESSING   Final Result         1.  CT angiogram of the neck within normal limits.      CT-CEREBRAL PERFUSION ANALYSIS   Final Result         1.  CT perfusion examination over the limited section of brain reveals 0 mL of brain parenchyma has less than 30% of cerebral blood flow (CBF).      2.  Please note that the cerebral perfusion was performed on the limited brain tissue around the basal ganglia  region. Infarct/ischemia outside the CT perfusion sections can be missed in this study.      CT-HEAD W/O   Final Result         1.  No acute intracranial abnormality is identified, there are changes of small vessel ischemic disease with mild atrophy noted.      DX-CHEST-LIMITED (1 VIEW)   Final Result         1.  No acute cardiopulmonary disease.   2.  Hyperexpansion of lungs favors changes of COPD.      Echocardiogram Comp W/O cont    (Results Pending)   MR-BRAIN-W/O    (Results Pending)     Results for orders placed or performed during the hospital encounter of 06/24/18   CBC WITH DIFFERENTIAL   Result Value Ref Range    WBC 7.6 4.8 - 10.8 K/uL    RBC 3.73 (L) 4.20 - 5.40 M/uL    Hemoglobin 11.8 (L) 12.0 - 16.0 g/dL    Hematocrit 33.4 (L) 37.0 - 47.0 %    MCV 89.5 81.4 - 97.8 fL    MCH 31.6 27.0 - 33.0 pg    MCHC 35.3 (H) 33.6 - 35.0 g/dL    RDW 40.5 35.9 - 50.0 fL    Platelet Count 258 164 - 446 K/uL    MPV 10.0 9.0 - 12.9 fL    Neutrophils-Polys 70.00 44.00 - 72.00 %    Lymphocytes 23.20 22.00 - 41.00 %    Monocytes 6.10 0.00 - 13.40 %    Eosinophils 0.10 0.00 - 6.90 %    Basophils 0.30 0.00 - 1.80 %    Immature Granulocytes 0.30 0.00 - 0.90 %    Nucleated RBC 0.00 /100 WBC    Neutrophils (Absolute) 5.30 2.00 - 7.15 K/uL    Lymphs (Absolute) 1.76 1.00 - 4.80 K/uL    Monos (Absolute) 0.46 0.00 - 0.85 K/uL    Eos (Absolute) 0.01 0.00 - 0.51 K/uL    Baso (Absolute) 0.02 0.00 - 0.12 K/uL    Immature Granulocytes (abs) 0.02 0.00 - 0.11 K/uL    NRBC (Absolute) 0.00 K/uL   COMP METABOLIC PANEL   Result Value Ref Range    Sodium 124 (L) 135 - 145 mmol/L    Potassium 3.4 (L) 3.6 - 5.5 mmol/L    Chloride 90 (L) 96 - 112 mmol/L    Co2 25 20 - 33 mmol/L    Anion Gap 9.0 0.0 - 11.9    Glucose 157 (H) 65 - 99 mg/dL    Bun 9 8 - 22 mg/dL    Creatinine 0.64 0.50 - 1.40 mg/dL    Calcium 9.1 8.5 - 10.5 mg/dL    AST(SGOT) 32 12 - 45 U/L    ALT(SGPT) 33 2 - 50 U/L    Alkaline Phosphatase 67 30 - 99 U/L    Total Bilirubin 0.8 0.1 -  1.5 mg/dL    Albumin 4.3 3.2 - 4.9 g/dL    Total Protein 6.9 6.0 - 8.2 g/dL    Globulin 2.6 1.9 - 3.5 g/dL    A-G Ratio 1.7 g/dL   TROPONIN   Result Value Ref Range    Troponin I <0.01 0.00 - 0.04 ng/mL   PROTHROMBIN TIME   Result Value Ref Range    PT 13.4 12.0 - 14.6 sec    INR 1.05 0.87 - 1.13   URINALYSIS CULTURE, IF INDICATED   Result Value Ref Range    Color Yellow     Character Clear     Specific Gravity 1.018 <1.035    Ph 8.0 5.0 - 8.0    Glucose Negative Negative mg/dL    Ketones Negative Negative mg/dL    Protein Negative Negative mg/dL    Bilirubin Negative Negative    Urobilinogen, Urine 0.2 Negative    Nitrite Negative Negative    Leukocyte Esterase Negative Negative    Occult Blood Negative Negative    Micro Urine Req see below    URINE DRUG SCREEN (TRIAGE)   Result Value Ref Range    Amphetamines Urine Negative Negative    Barbiturates Negative Negative    Benzodiazepines Negative Negative    Cocaine Metabolite Negative Negative    Methadone Negative Negative    Opiates Negative Negative    Oxycodone Negative Negative    Phencyclidine -Pcp Negative Negative    Propoxyphene Negative Negative    Cannabinoid Metab Negative Negative   ESTIMATED GFR   Result Value Ref Range    GFR If African American >60 >60 mL/min/1.73 m 2    GFR If Non African American >60 >60 mL/min/1.73 m 2   TSH (Thyroid Stimulating Hormone)   Result Value Ref Range    TSH 1.160 0.380 - 5.330 uIU/mL   OSMOLALITY SERUM   Result Value Ref Range    Osmolality Serum 266 (L) 278 - 298 mOsm/kg H2O   OSMOLALITY URINE   Result Value Ref Range    Osmolality Urine 203 (L) 300 - 900 mOsm/kg H2O   LIPID PROFILE   Result Value Ref Range    Cholesterol,Tot 176 100 - 199 mg/dL    Triglycerides 45 0 - 149 mg/dL    HDL 90 >=40 mg/dL    LDL 77 <100 mg/dL   ACCU-CHEK GLUCOSE   Result Value Ref Range    Glucose - Accu-Ck 153 (H) 65 - 99 mg/dL   EKG (ER)   Result Value Ref Range    Report       [facility] Emergency Dept.    Test Date:   2018  Pt Name:    KASH MAIN            Department: ER  MRN:        [id number]                      Room:       [facility]  Gender:     Female                       Technician: [id number]  :        [id number]                   Requested By:DIDIER LEWIS  Order #:    [id number]                    Reading MD:    Measurements  Intervals                                Axis  Rate:       71                           P:          72  GA:         196                          QRS:        60  QRSD:       110                          T:          62  QT:         428  QTc:        466    Interpretive Statements  SINUS RHYTHM  NONSPECIFIC INTRAVENTRICULAR CONDUCTION DELAY  Compared to ECG 2017 18:48:51  Sinus tachycardia no longer present  Right ventricular hypertrophy no longer present           PROCEDURES     MEDICAL RECORD  I have reviewed patient's medical record and pertinent results are listed above.    COURSE & MEDICAL DECISION MAKING  I have reviewed any medical record information, laboratory studies and radiographic results as noted above.    9:07 PM  I activated the stroke protocol.  I was called into the room at approximately 905pm, because the pt was experiencing another event of garbled speech, and left upper arm weakness.      9:40 PM  I spoke with Dr. Poe who says he sees a possible filling defect in M1 segment on the right.    10:02 PM  ULICES returned page, and he will see the pt    10:20 PM  Redd NICKERSON states neurology recommended but I did not talk to the neurologist myself.  The family also states neurology verbally consented them.     10:24 PM   Neurology repaged     10:27 PM  Neurology recommends Alteplase.      10:42 PM  I spoke to Dr. Arellano, on for interventional rad, and he will look at the ct and call me back.    10:50 PM  Dr. Arellano, no intervention at this time.     11:00 PM   will admit the pt.    CRITICAL CARE  The very real possibility of a deterioration of this patient's condition  required the highest level of my preparedness for sudden, emergent intervention.  I provided critical care services, which included medication orders, frequent reevaluations of the patient's condition and response to treatment, ordering and reviewing test results, and discussing the case with various consultants.  The critical care time associated with the care of the patient was 45 minutes. Review chart for interventions. This time is exclusive of any other billable procedures.       Differential diagnoses include but not limited to: cva, tia, subdural, epidural, sah    FINAL IMPRESSION  1. Cerebrovascular accident (CVA), unspecified mechanism (HCC)    2.      Critical care time 45 minutes.         Electronically signed by: Jono De Leon, 6/24/2018 9:07 PM

## 2018-06-25 NOTE — THERAPY
PT orders received. Pt currently with active bedrest orders until Tues, 6/26/18, morning. Will initate PT evaluation once bedrest has been lifted and pt medically stable to mobilize.     Erika Mota, PT, DPT Pager: [phone]

## 2018-06-26 PROBLEM — R47.01 APHASIA: Status: ACTIVE | Noted: 2018-06-25

## 2018-06-26 PROBLEM — R41.89 COGNITIVE DEFICITS: Status: ACTIVE | Noted: 2017-07-10

## 2018-06-26 PROBLEM — I48.0 PAROXYSMAL ATRIAL FIBRILLATION (HCC): Status: ACTIVE | Noted: 2018-06-26

## 2018-06-26 LAB
ALBUMIN SERPL BCP-MCNC: 3.6 G/DL (ref 3.2–4.9)
ALBUMIN/GLOB SERPL: 1.3 G/DL
ALP SERPL-CCNC: 64 U/L (ref 30–99)
ALT SERPL-CCNC: 26 U/L (ref 2–50)
ANION GAP SERPL CALC-SCNC: 7 MMOL/L (ref 0–11.9)
AST SERPL-CCNC: 20 U/L (ref 12–45)
BASOPHILS # BLD AUTO: 0.4 % (ref 0–1.8)
BASOPHILS # BLD: 0.03 K/UL (ref 0–0.12)
BILIRUB SERPL-MCNC: 0.6 MG/DL (ref 0.1–1.5)
BUN SERPL-MCNC: 8 MG/DL (ref 8–22)
CALCIUM SERPL-MCNC: 8.3 MG/DL (ref 8.5–10.5)
CHLORIDE SERPL-SCNC: 103 MMOL/L (ref 96–112)
CO2 SERPL-SCNC: 22 MMOL/L (ref 20–33)
CORTIS SERPL-MCNC: 21.9 UG/DL (ref 0–23)
CREAT SERPL-MCNC: 0.56 MG/DL (ref 0.5–1.4)
EOSINOPHIL # BLD AUTO: 0.14 K/UL (ref 0–0.51)
EOSINOPHIL NFR BLD: 2.1 % (ref 0–6.9)
ERYTHROCYTE [DISTWIDTH] IN BLOOD BY AUTOMATED COUNT: 42.1 FL (ref 35.9–50)
GLOBULIN SER CALC-MCNC: 2.8 G/DL (ref 1.9–3.5)
GLUCOSE SERPL-MCNC: 99 MG/DL (ref 65–99)
HCT VFR BLD AUTO: 36.9 % (ref 37–47)
HGB BLD-MCNC: 12.6 G/DL (ref 12–16)
IMM GRANULOCYTES # BLD AUTO: 0.02 K/UL (ref 0–0.11)
IMM GRANULOCYTES NFR BLD AUTO: 0.3 % (ref 0–0.9)
LV EJECT FRACT  99904: 60
LV EJECT FRACT MOD 2C 99903: 76.82
LV EJECT FRACT MOD 4C 99902: 67.41
LV EJECT FRACT MOD BP 99901: 73.36
LYMPHOCYTES # BLD AUTO: 2.59 K/UL (ref 1–4.8)
LYMPHOCYTES NFR BLD: 38.5 % (ref 22–41)
MCH RBC QN AUTO: 31.3 PG (ref 27–33)
MCHC RBC AUTO-ENTMCNC: 34.1 G/DL (ref 33.6–35)
MCV RBC AUTO: 91.8 FL (ref 81.4–97.8)
MONOCYTES # BLD AUTO: 0.6 K/UL (ref 0–0.85)
MONOCYTES NFR BLD AUTO: 8.9 % (ref 0–13.4)
NEUTROPHILS # BLD AUTO: 3.35 K/UL (ref 2–7.15)
NEUTROPHILS NFR BLD: 49.8 % (ref 44–72)
NRBC # BLD AUTO: 0 K/UL
NRBC BLD-RTO: 0 /100 WBC
PLATELET # BLD AUTO: 246 K/UL (ref 164–446)
PMV BLD AUTO: 10.1 FL (ref 9–12.9)
POTASSIUM SERPL-SCNC: 4 MMOL/L (ref 3.6–5.5)
PROT SERPL-MCNC: 6.4 G/DL (ref 6–8.2)
RBC # BLD AUTO: 4.02 M/UL (ref 4.2–5.4)
SODIUM SERPL-SCNC: 132 MMOL/L (ref 135–145)
SODIUM SERPL-SCNC: 133 MMOL/L (ref 135–145)
SODIUM SERPL-SCNC: 136 MMOL/L (ref 135–145)
WBC # BLD AUTO: 6.7 K/UL (ref 4.8–10.8)

## 2018-06-26 PROCEDURE — 700111 HCHG RX REV CODE 636 W/ 250 OVERRIDE (IP): Performed by: INTERNAL MEDICINE

## 2018-06-26 PROCEDURE — G8996 SWALLOW CURRENT STATUS: HCPCS | Mod: CI

## 2018-06-26 PROCEDURE — G8997 SWALLOW GOAL STATUS: HCPCS | Mod: CH

## 2018-06-26 PROCEDURE — G8979 MOBILITY GOAL STATUS: HCPCS | Mod: CI

## 2018-06-26 PROCEDURE — G8987 SELF CARE CURRENT STATUS: HCPCS | Mod: CH

## 2018-06-26 PROCEDURE — G8989 SELF CARE D/C STATUS: HCPCS | Mod: CH

## 2018-06-26 PROCEDURE — G8980 MOBILITY D/C STATUS: HCPCS | Mod: CI

## 2018-06-26 PROCEDURE — 85025 COMPLETE CBC W/AUTO DIFF WBC: CPT

## 2018-06-26 PROCEDURE — 93306 TTE W/DOPPLER COMPLETE: CPT

## 2018-06-26 PROCEDURE — 99233 SBSQ HOSP IP/OBS HIGH 50: CPT | Performed by: HOSPITALIST

## 2018-06-26 PROCEDURE — 770006 HCHG ROOM/CARE - MED/SURG/GYN SEMI*

## 2018-06-26 PROCEDURE — 700102 HCHG RX REV CODE 250 W/ 637 OVERRIDE(OP): Performed by: HOSPITALIST

## 2018-06-26 PROCEDURE — G8988 SELF CARE GOAL STATUS: HCPCS | Mod: CH

## 2018-06-26 PROCEDURE — 97161 PT EVAL LOW COMPLEX 20 MIN: CPT

## 2018-06-26 PROCEDURE — 80053 COMPREHEN METABOLIC PANEL: CPT

## 2018-06-26 PROCEDURE — 82533 TOTAL CORTISOL: CPT

## 2018-06-26 PROCEDURE — 92523 SPEECH SOUND LANG COMPREHEN: CPT

## 2018-06-26 PROCEDURE — G8978 MOBILITY CURRENT STATUS: HCPCS | Mod: CI

## 2018-06-26 PROCEDURE — 97165 OT EVAL LOW COMPLEX 30 MIN: CPT

## 2018-06-26 PROCEDURE — 84295 ASSAY OF SERUM SODIUM: CPT

## 2018-06-26 PROCEDURE — A9270 NON-COVERED ITEM OR SERVICE: HCPCS | Performed by: HOSPITALIST

## 2018-06-26 PROCEDURE — 93306 TTE W/DOPPLER COMPLETE: CPT | Mod: 26 | Performed by: INTERNAL MEDICINE

## 2018-06-26 RX ADMIN — ATORVASTATIN CALCIUM 40 MG: 40 TABLET, FILM COATED ORAL at 20:07

## 2018-06-26 RX ADMIN — ENOXAPARIN SODIUM 40 MG: 100 INJECTION SUBCUTANEOUS at 11:09

## 2018-06-26 RX ADMIN — STANDARDIZED SENNA CONCENTRATE AND DOCUSATE SODIUM 2 TABLET: 8.6; 5 TABLET, FILM COATED ORAL at 20:07

## 2018-06-26 ASSESSMENT — COGNITIVE AND FUNCTIONAL STATUS - GENERAL
SUGGESTED CMS G CODE MODIFIER MOBILITY: CH
SUGGESTED CMS G CODE MODIFIER DAILY ACTIVITY: CH
MOBILITY SCORE: 24
DAILY ACTIVITIY SCORE: 24

## 2018-06-26 ASSESSMENT — GAIT ASSESSMENTS
GAIT LEVEL OF ASSIST: SUPERVISED
DISTANCE (FEET): 400

## 2018-06-26 ASSESSMENT — ENCOUNTER SYMPTOMS
NAUSEA: 0
DIZZINESS: 0
ABDOMINAL PAIN: 0
HEADACHES: 0
LOSS OF CONSCIOUSNESS: 0
DIARRHEA: 0
DOUBLE VISION: 0
CHILLS: 0
BACK PAIN: 0
VOMITING: 0
PHOTOPHOBIA: 0
FEVER: 0

## 2018-06-26 ASSESSMENT — PAIN SCALES - GENERAL
PAINLEVEL_OUTOF10: 0

## 2018-06-26 ASSESSMENT — ACTIVITIES OF DAILY LIVING (ADL): TOILETING: INDEPENDENT

## 2018-06-26 NOTE — CARE PLAN
Problem: Safety  Goal: Will remain free from falls    Intervention: Implement fall precautions  Bed alarm on, pt encouraged to use call light for needs.      Problem: Mobility  Goal: Risk for activity intolerance will decrease    Intervention: Assess and monitor signs of activity intolerance  Pt up to EOB.

## 2018-06-26 NOTE — PROGRESS NOTES
[facility] Hospitalist Progress Note    Date of Service: 2018    Chief Complaint  67 y.o. female admitted 2018 with dificulty with word finding.    Ms Milan has a history of atrial fibrillation, she was admitted on 18 with an episode of unresponsiveness and speech changes.  Non-contrast CT showed now evidence of ICH and CTA demonstrated a filling defect int he proximal right M1 segment.  She was evaluated by neurology and altepase was given. Subsequent MRI shows no evidence of acute infarction.    Interval Problem Update  I interviewed the patient with her daughters at the bedside  Patient is returned to normal, her daughters are in agreement that her speech is normal and she has baseline strength and mobility  Patient does have issues with short-term memory, this is been going on for months  She denies headache, no nosebleeds, no signs of overt GI bleeding  She is in sinus rhythm    Consultants/Specialty  Neurology  Critical care, I discussed the patient's condition with Dr. Avalos this morning on ICU rounds    Disposition  Okay to transfer to neurology floor  PT/OT/speech        Review of Systems   Constitutional: Negative for chills and fever.   Eyes: Negative for double vision and photophobia.   Cardiovascular: Negative for chest pain.   Gastrointestinal: Negative for abdominal pain, diarrhea, nausea and vomiting.   Genitourinary: Negative for dysuria.   Musculoskeletal: Negative for back pain.   Skin: Negative for itching and rash.   Neurological: Negative for dizziness, loss of consciousness and headaches.      Physical Exam  Laboratory/Imaging   Hemodynamics  Temp (24hrs), Av.2 °C (98.9 °F), Min:36.9 °C (98.4 °F), Max:37.5 °C (99.5 °F)   Temperature: 36.9 °C (98.4 °F), Monitored Temp: 36.8 °C (98.2 °F)  Pulse  Av.4  Min: 54  Max: 88 Heart Rate (Monitored): (!) 114  NIBP: 124/59      Respiratory      Respiration: (!) 82, Pulse Oximetry: 98 %             Fluids    Intake/Output  Summary (Last 24 hours) at 06/26/18 0932  Last data filed at 06/26/18 0600   Gross per 24 hour   Intake              770 ml   Output             2145 ml   Net            -1375 ml       Nutrition  Orders Placed This Encounter   Procedures   • Diet Order Regular     Standing Status:   Standing     Number of Occurrences:   1     Order Specific Question:   Diet:     Answer:   Regular [1]     Order Specific Question:   Consistency/Fluid modifications:     Answer:   Thin Liquids [3]     Physical Exam   Constitutional: She is oriented to person, place, and time. She appears well-developed and well-nourished. No distress.   HENT:   Head: Normocephalic and atraumatic.   Eyes: Conjunctivae are normal. Right eye exhibits no discharge. Left eye exhibits no discharge.   Neck: No JVD present.   Cardiovascular: Normal rate and regular rhythm.    Pulmonary/Chest: Effort normal. No stridor. No respiratory distress. She has no wheezes. She has no rales.   Abdominal: Soft. There is no tenderness. There is no rebound and no guarding.   Musculoskeletal: She exhibits no edema.   Neurological: She is alert and oriented to person, place, and time. No cranial nerve deficit.   Poor short term memory   Skin: Skin is warm and dry. She is not diaphoretic. No erythema.   Psychiatric: She has a normal mood and affect. Her behavior is normal.       Recent Labs      06/24/18 2055 06/25/18 0415 06/26/18   0625   WBC  7.6  8.4  6.7   RBC  3.73*  3.96*  4.02*   HEMOGLOBIN  11.8*  12.5  12.6   HEMATOCRIT  33.4*  35.9*  36.9*   MCV  89.5  90.7  91.8   MCH  31.6  31.6  31.3   MCHC  35.3*  34.8  34.1   RDW  40.5  41.4  42.1   PLATELETCT  258  279  246   MPV  10.0  10.2  10.1     Recent Labs      06/24/18 2055 06/25/18 0415 06/25/18   1841  06/26/18   0018  06/26/18   0625   SODIUM  124*  138   < >  136  133*  132*   POTASSIUM  3.4*  3.8   --    --    --   4.0   CHLORIDE  90*  104   --    --    --   103   CO2  25  27   --    --    --   22    GLUCOSE  157*  109*   --    --    --   99   BUN  9  8   --    --    --   8   CREATININE  0.64  0.66   --    --    --   0.56   CALCIUM  9.1  8.9   --    --    --   8.3*    < > = values in this interval not displayed.     Recent Labs      06/24/18 2055   INR  1.05         Recent Labs      06/24/18 2055   TRIGLYCERIDE  45   HDL  90   LDL  77          Assessment/Plan     * Aphasia- (present on admission)   Assessment & Plan    Patient presented with aphasia  Symptoms resolved after TPA  CTA did show nonocclusive thrombus in the right M1 segment  MRI does not demonstrate any area of acute infarction  ?seizure or TIA  Medical management for TIA is appropriate          Paroxysmal atrial fibrillation (HCC)- (present on admission)   Assessment & Plan    Per chart patient has history of atrial fibrillation, she presents with probable TIA  Currently rate controlled sinus rhythm  Anticoagulation is appropriate        Hyponatremia- (present on admission)   Assessment & Plan    Sodium has corrected to the normal range        Cognitive deficits- (present on admission)   Assessment & Plan    Family is concerned about patient's short-term memory loss  Outpatient follow-up        Hyperglycemia- (present on admission)   Assessment & Plan    A1c 5.7          Quality-Core Measures   Reviewed items::  Labs reviewed and Medications reviewed  Rosario catheter::  No Rosario  DVT prophylaxis pharmacological::  Enoxaparin (Lovenox)  DVT prophylaxis - mechanical:  SCDs  Ulcer Prophylaxis::  Not indicated

## 2018-06-26 NOTE — THERAPY
"Occupational Therapy Evaluation completed.   Functional Status:  Pt currently able to perform basic ADL's, functional mobility & bed mobility with supervision.  Pt has no strength or co-ord deficits in either UE.  Pt's  present & reports he is available to assist 24/7 if needed.  Plan of Care: Patient with no further skilled OT needs in the acute care setting at this time  Discharge Recommendations:  Equipment: No Equipment Needed. Post-acute therapy Currently anticipate no further skilled therapy needs once patient is discharged from the inpatient setting.    See \"Rehab Therapy-Acute\" Patient Summary Report for complete documentation.    "

## 2018-06-26 NOTE — PROGRESS NOTES
Critical Care Progress Note        Chief Complaint: Altered mental status    History of Present Illness: 67 y.o. female who presented evening of 6/24 w/ above chief complaints.  Symptoms started 2 hours prior to ED triage. Head CT w/o hemorrhage.  CTA showed right M1 segment filling defect; received tPA 20:35 6/24 and admitted to ICU.    ROS:  Respiratory: negative, Cardiac: negative, GI: negative.  All other systems negative.    Interval Events:  -Q2 neuro check  -NIHSS 1 this AM  -Walked in hallway today  -Na+ 132 this AM    Past Medical History:   Diagnosis Date   • A-fib (HCC)    • Hypokalemia    • Hypotension    • Syncope          Past Surgical History:   Procedure Laterality Date   • COLONOSCOPY     • ENDOSCOPY     • TONSILLECTOMY     • TUBAL COAGULATION LAPAROSCOPIC BILATERAL           Family History   Problem Relation Age of Onset   • Dementia Mother    • Cancer Sister      breast   • Other Brother      wegeners grandulomatosis   • Lung Disease Paternal Grandfather      TB. Lung removal         Social History   Substance Use Topics   • Smoking status: Never Smoker   • Smokeless tobacco: Never Used   • Alcohol use No         Respiratory:     Pulse Oximetry: 98 %  Chest Tube Drains:          Exam: unlabored respirations, no intercostal retractions or accessory muscle use and clear to auscultation without rales or wheezes  ImagingNone - Reviewed     HemoDynamics:  Pulse: 82, Heart Rate (Monitored): (!) 114  NIBP: 124/59       Exam: S1S2 regular rate and rhythm; no murmurs  Imaging: None - Reviewed  Recent Labs      06/24/18 2055   TROPONINI  <0.01       Neuro:  GCS Total Valerie Coma Score: 15       Exam: L hand  strength 5/5 as well and L foor plantarflexion; speech fluid; attention span normal; RASS 0; orients and follows commands  Imaging: Available data reviewed    Fluids:  Intake/Output       06/24/18 0700 - 06/25/18 0659 06/25/18 0700 - 06/26/18 0659 06/26/18 0700 - 06/27/18 0659      [id number]   Total  Total  Total       Intake    P.O.  --  0 0  220  480 700  --  -- --    P.O. -- 0 0 220 480 700 -- -- --    I.V.  --  700 700  300  20 320  --  -- --    IV Piggyback Volume (POtassium Chloride) -- 200 200 -- -- -- -- -- --    IV Volume (ns) -- 500 500 200 -- 200 -- -- --    IV Volume (TKO) -- -- -- 50 20 70 -- -- --    IV Volume (IVPB) -- -- -- 50 -- 50 -- -- --    Total Intake -- 700 700  -- -- --       Output    Urine  --  4650 4650  1335  1185 2520  --  -- --    Output (mL) (Urinary Catheter Indwelling Catheter fr 16) -- 4650 4650 1335 1185 2520 -- -- --    Stool  --  -- --  --  -- --  --  -- --    Number of Times Stooled -- -- -- -- 0 x 0 x -- -- --    Total Output -- 4650 4650 1335 1185 2520 -- -- --       Net I/O     -- -3950 -3950 -005 -025 -1500 -- -- --           Recent Labs      18   1841  18   0018  18   0625   SODIUM  124*  138   < >  136  133*  132*   POTASSIUM  3.4*  3.8   --    --    --   4.0   CHLORIDE  90*  104   --    --    --   103   CO2  25  27   --    --    --   22   BUN  9  8   --    --    --   8   CREATININE  0.64  0.66   --    --    --   0.56   CALCIUM  9.1  8.9   --    --    --   8.3*    < > = values in this interval not displayed.       GI/Nutrition:  Exam: soft, nontender, nondistended, no hepatosplenomegaly  Imaging: None - Reviewed  taking PO  Liver Function  Recent Labs      1818   0625   ALTSGPT  33  29  26   ASTSGOT  32  24  20   ALKPHOSPHAT  67  61  64   TBILIRUBIN  0.8  0.9  0.6   GLUCOSE  157*  109*  99       Heme:  Recent Labs      18   RBC  3.73*  3.96*  4.02*   HEMOGLOBIN  11.8*  12.5  12.6   HEMATOCRIT  33.4*  35.9*  36.9*   PLATELETCT  258  279  246   PROTHROMBTM  13.4   --    --    INR  1.05   --    --        Infectious Disease:  Monitored Temp 2  Av.2 °C (98.9 °F)  Min:  36.8 °C (98.2 °F)  Max: 37.5 °C (99.5 °F)  Temp  Av.2 °C (98.9 °F)  Min: 36.9 °C (98.4 °F)  Max: 37.5 °C (99.5 °F)  Micro: none  Recent Labs      18   WBC  7.6  8.4  6.7   NEUTSPOLYS  70.00  72.10*  49.80   LYMPHOCYTES  23.20  20.30*  38.50   MONOCYTES  6.10  6.90  8.90   EOSINOPHILS  0.10  0.00  2.10   BASOPHILS  0.30  0.50  0.40   ASTSGOT  32  24  20   ALTSGPT  33  29  26   ALKPHOSPHAT  67  61  64   TBILIRUBIN  0.8  0.9  0.6     Current Facility-Administered Medications   Medication Dose Frequency Provider Last Rate Last Dose   • atorvastatin (LIPITOR) tablet 40 mg  40 mg QHS MIGUE CesarO.   40 mg at 18   • senna-docusate (PERICOLACE or SENOKOT S) 8.6-50 MG per tablet 2 Tab  2 Tab BID Fina Galicia M.D.   1 Tab at 18    And   • polyethylene glycol/lytes (MIRALAX) PACKET 1 Packet  1 Packet QDAY PRN Fina Galicia M.D.        And   • magnesium hydroxide (MILK OF MAGNESIA) suspension 30 mL  30 mL QDAY PRN Fina Galicia M.D.        And   • bisacodyl (DULCOLAX) suppository 10 mg  10 mg QDAY PRN Fina Galicia M.D.       • Respiratory Care per Protocol   Continuous RT Fina Galicia M.D.       • labetalol (NORMODYNE,TRANDATE) injection 10 mg  10 mg Q4HRS PRN Fina Galicia M.D.        Or   • hydrALAZINE (APRESOLINE) injection 10 mg  10 mg Q2HRS PRN Fina Galicia M.D.       • aspirin (ASA) tablet 325 mg  325 mg DAILY Fina Galicia M.D.   325 mg at 18    Or   • aspirin (ASA) chewable tab 324 mg  324 mg DAILY Fina Galicia M.D.        Or   • aspirin (ASA) suppository 300 mg  300 mg DAILY Fina Galicia M.D.         Last reviewed on 2018  9:14 PM by Tristen Rendon    Quality  Measures:   Labs reviewed and Medications reviewed   Rosario catheter: Critically Ill - Requiring Accurate Measurement of Urinary Output       DVT Prophylaxis: Enoxaparin (Lovenox)   DVT prophylaxis - mechanical: SCDs        Assessed for rehab: Patient was assess for and/or received rehabilitation services during this hospitalization        Assessment / Plan    CVA due to proximal right M1 segment occlusion; s/p tPA 20:35 6/24.  LDL was 77.  Troponin was (-).  6/25 EEG w/o epileptiform discharges  -Continue ischemic stroke protocol  -Continue ASA  -Continue rehab     Hyponatremia--somewhat worse.  TSH OK. Cortisol 21.9 this AM  -Follow closely    History of paroxysmal atrial fibrillation  - ? Need for long-term anticoagulation  - Cardiac monitoring     Polyuria 6/24-25; s/p ddAVP  -Monitor urine output  -Monitor Na+    Disposition  -Can leave ICU

## 2018-06-26 NOTE — PROGRESS NOTES
Pt transported from [facility] to [facility]. Family at bedside. All belongings with patient.

## 2018-06-26 NOTE — PROCEDURES
"DATE OF SERVICE:  06/25/2018    This is inpatient EEG, was done on 06/25/2018.    ROUTINE VIDEO ELECTROENCEPHALOGRAM REPORT        NAME: Azul Milan \"Regina\"     REFERRING Dr:Fina Galicia M.D.     DURATION: 23 minutes     INDICATION: Garbled speech and difficulty finding the correct words        TECHNIQUE: 30 channel routine electroencephalogram (EEG) was performed in accordance with the international 10-20 system. The study was reviewed in bipolar and referential montages. The recording examined the patient during wakeful and drowsy state(s).      DESCRIPTION OF THE RECORD:        Background rhythm during awake stage shows well-organized, well-developed, average voltage 10 to 11 hertz alpha activity in the posterior regions.  It blocks with eye opening and it is bilaterally synchronous and symmetrical.  No spike-and-wave discharges but generalized delta bursts are seen.  Photic stimulation did not produce any abnormalities. Stage I sleep was achieved.        ACTIVATION PROCEDURES:       Photic Stimulation were done     ICTAL AND/OR INTERICTAL FINDINGS:    No focal or generalized epileptiform activity noted. No regional slowing was seen during this routine study.  No clinical events or seizures were reported or recorded during the study.       EKG: sampling of the EKG recording demonstrated sinus rhythm.          INTERPRETATION:           ________________________________________________________________________     This is mildly abnormal routine video EEG recording in the awake and drowsy/sleep state(s).     This scalp EEG denotes                  1. Diffuse nonspecific cortical dysfunction - Mild      This nonspecific abnormalities could be secondary to metabolic, toxic, polypharmacy or even post-ictal     There are no epileptiform discharges in this record     Of note, unremarkable EEG does not completely exclude the diagnosis  of seizures since seizure is an episodic phenomena.  Clinical " correlation may help     If clinical suspicion of seizure remains high.  Prolonged outpatient EEG   monitoring may be of help.     EEG 06/25/18 5:50 PM     ________________________________________________________________________                           ____________________________________     MD JOSEPH MEYER    DD:  06/25/2018 17:51:43  DT:  06/25/2018 18:02:30    D#:  [id number]  Job#:  [id number]

## 2018-06-26 NOTE — PROGRESS NOTES
Report called to receiving RN for S198.  updated on patient's transfer status. Transfer personnel notified that patient is ready.

## 2018-06-26 NOTE — CARE PLAN
Problem: Discharge Barriers/Planning  Goal: Patient's Continuum of care needs are met    Intervention: Identify potential discharge barriers on admission and throughout hospital stay  PT/OT working with patient to determine ADLs at home.      Problem: Risk for Deep Vein Thrombosis/Venous Thromboembolism  Goal: DVT/VTE Prevention Measures in Place    Intervention: DVT/VTE prophylaxis assessed and initiated by day 1 or 2 of hospital admission. (Both pharmacologic and mechanical considered. If none needed, reason documented by LIP.)  Lovenox initiated.

## 2018-06-26 NOTE — THERAPY
"Speech Language Therapy Evaluation completed to address speech, communication and cognition    Functional Status: Upon entry, patient was AAOx2 with confusion regarding day and reason (although pointing to her head). She reported being responsible for paying the bills although later daughter reported patient requires assistance with ADLs/IADLs. Daughter reported patient does not have a formal diagnosis of dementia and expressed concern regarding rapid decline in the last few years and how to best prepare patient's SO who currently assists with patient's needs. The [place] Cognitive Assessment (MOCA) along with portions of other standardized measures were used to assess various cognitive linguistic domains. Patient scored 17/30 on the MOCA placing patient below the range of normal. Severe deficits evident during STM (verbal and visual memory tasks), executive functioning, and generative naming tasks. Moderate deficits noted with verbal problem solving, following 3-step directives, and reading comprehension at the sentence level. Minimal deficits with reasoning and auditory comprehension at the paragraph level. Speech at times tangential and off-topic requiring redirection which may be a tactic to compensate for deficits.     Recommendations: At this time, there is concern for independent living and furthermore for patient operating a motorized vehicle. Would recommend 24-hour supervision with IADLs and speech therapy to address deficits stated above. Consider outpatient neurology consult for definitive diagnosis of dementia, per family wishes/concerns.     Plan of Care: Will benefit from Speech Therapy 3 times per week    Post-Acute Therapy: Discharge to a transitional care facility for continued skilled therapy services.    See \"Rehab Therapy-Acute\" Patient Summary Report for complete documentation.   "

## 2018-06-26 NOTE — PROGRESS NOTES
Pt impulsive and forgetful at times, some word finding impairment still present. Pt disoriented to place and event. Pt reorients easily. PERRL 3mm bilaterally.  AUGUSTINE, denies n/t. BS+, denies n/v. Rosario down to gravity, clear yellow urine visualized in chamber, 1200 ml produced for this shift.   MRI completed over night, interpretation pending.

## 2018-06-26 NOTE — EEG PROGRESS NOTE
"ROUTINE VIDEO ELECTROENCEPHALOGRAM REPORT      NAME: Azul Milan \"Regina\"    REFERRING Dr:Fina Galicia M.D.    DURATION: 23 minutes    INDICATION: Garbled speech and difficulty finding the correct words      TECHNIQUE: 30 channel routine electroencephalogram (EEG) was performed in accordance with the international 10-20 system. The study was reviewed in bipolar and referential montages. The recording examined the patient during wakeful and drowsy state(s).     DESCRIPTION OF THE RECORD:      Background rhythm during awake stage shows well-organized, well-developed, average voltage 10 to 11 hertz alpha activity in the posterior regions.  It blocks with eye opening and it is bilaterally synchronous and symmetrical.  No spike-and-wave discharges but generalized delta bursts are seen.  Photic stimulation did not produce any abnormalities. Stage I sleep was achieved.      ACTIVATION PROCEDURES:      Photic Stimulation were done    ICTAL AND/OR INTERICTAL FINDINGS:    No focal or generalized epileptiform activity noted. No regional slowing was seen during this routine study.  No clinical events or seizures were reported or recorded during the study.      EKG: sampling of the EKG recording demonstrated sinus rhythm.        INTERPRETATION:        ________________________________________________________________________    This is mildly abnormal routine video EEG recording in the awake and drowsy/sleep state(s).    This scalp EEG denotes      1. Diffuse nonspecific cortical dysfunction - Mild     This nonspecific abnormalities could be secondary to metabolic, toxic, polypharmacy or even post-ictal    There are no epileptiform discharges in this record    Of note, unremarkable EEG does not completely exclude the diagnosis  of seizures since seizure is an episodic phenomena.  Clinical correlation may help     If clinical suspicion of seizure remains high.  Prolonged outpatient EEG   monitoring may be of " help.    EEG 06/25/18 5:50 PM    ________________________________________________________________________

## 2018-06-26 NOTE — THERAPY
"Physical Therapy Evaluation completed.   Bed Mobility:  Supine to Sit: Modified Independent  Transfers: Sit to Stand: Supervised  Gait: Level Of Assist: Supervised with No Equipment Needed       Plan of Care: Patient with no further skilled PT needs in the acute care setting at this time  Discharge Recommendations: Equipment: No Equipment Needed.     See \"Rehab Therapy-Acute\" Patient Summary Report for complete documentation.     Pt presented to PT for primary risk reduction for LOB/falling. Pt was able to demonstrate Mod I to SPV for all functional mobility at this time with no AD. Pt was able to complete a flight of stairs and high dynamic balance testing with no apparent LOB and risk for falling. Pt reports of no concerns with functional mobility and feels safe to d/c home. Encouraged to continue ambulating while in house as needed. Pt is in no acute skilled PT needs at this time. Antcipate pt to d/c home once medically clear.   "

## 2018-06-27 ENCOUNTER — PATIENT OUTREACH (OUTPATIENT)
Dept: HEALTH INFORMATION MANAGEMENT | Facility: OTHER | Age: 68
End: 2018-06-27

## 2018-06-27 LAB
ALBUMIN SERPL BCP-MCNC: 3.9 G/DL (ref 3.2–4.9)
ALBUMIN/GLOB SERPL: 1.4 G/DL
ALP SERPL-CCNC: 60 U/L (ref 30–99)
ALT SERPL-CCNC: 20 U/L (ref 2–50)
ANION GAP SERPL CALC-SCNC: 9 MMOL/L (ref 0–11.9)
AST SERPL-CCNC: 17 U/L (ref 12–45)
BASOPHILS # BLD AUTO: 0.4 % (ref 0–1.8)
BASOPHILS # BLD: 0.03 K/UL (ref 0–0.12)
BILIRUB SERPL-MCNC: 0.6 MG/DL (ref 0.1–1.5)
BUN SERPL-MCNC: 10 MG/DL (ref 8–22)
CALCIUM SERPL-MCNC: 8.6 MG/DL (ref 8.5–10.5)
CHLORIDE SERPL-SCNC: 106 MMOL/L (ref 96–112)
CO2 SERPL-SCNC: 23 MMOL/L (ref 20–33)
CREAT SERPL-MCNC: 0.6 MG/DL (ref 0.5–1.4)
EOSINOPHIL # BLD AUTO: 0.14 K/UL (ref 0–0.51)
EOSINOPHIL NFR BLD: 1.9 % (ref 0–6.9)
ERYTHROCYTE [DISTWIDTH] IN BLOOD BY AUTOMATED COUNT: 42 FL (ref 35.9–50)
GLOBULIN SER CALC-MCNC: 2.8 G/DL (ref 1.9–3.5)
GLUCOSE SERPL-MCNC: 95 MG/DL (ref 65–99)
HCT VFR BLD AUTO: 37.9 % (ref 37–47)
HGB BLD-MCNC: 13 G/DL (ref 12–16)
IMM GRANULOCYTES # BLD AUTO: 0.01 K/UL (ref 0–0.11)
IMM GRANULOCYTES NFR BLD AUTO: 0.1 % (ref 0–0.9)
LYMPHOCYTES # BLD AUTO: 2.18 K/UL (ref 1–4.8)
LYMPHOCYTES NFR BLD: 30.4 % (ref 22–41)
MCH RBC QN AUTO: 31.4 PG (ref 27–33)
MCHC RBC AUTO-ENTMCNC: 34.3 G/DL (ref 33.6–35)
MCV RBC AUTO: 91.5 FL (ref 81.4–97.8)
MONOCYTES # BLD AUTO: 0.76 K/UL (ref 0–0.85)
MONOCYTES NFR BLD AUTO: 10.6 % (ref 0–13.4)
NEUTROPHILS # BLD AUTO: 4.06 K/UL (ref 2–7.15)
NEUTROPHILS NFR BLD: 56.6 % (ref 44–72)
NRBC # BLD AUTO: 0 K/UL
NRBC BLD-RTO: 0 /100 WBC
PLATELET # BLD AUTO: 295 K/UL (ref 164–446)
PMV BLD AUTO: 10.1 FL (ref 9–12.9)
POTASSIUM SERPL-SCNC: 3.7 MMOL/L (ref 3.6–5.5)
PROT SERPL-MCNC: 6.7 G/DL (ref 6–8.2)
RBC # BLD AUTO: 4.14 M/UL (ref 4.2–5.4)
SODIUM SERPL-SCNC: 138 MMOL/L (ref 135–145)
WBC # BLD AUTO: 7.2 K/UL (ref 4.8–10.8)

## 2018-06-27 PROCEDURE — 80053 COMPREHEN METABOLIC PANEL: CPT

## 2018-06-27 PROCEDURE — 700102 HCHG RX REV CODE 250 W/ 637 OVERRIDE(OP): Performed by: HOSPITALIST

## 2018-06-27 PROCEDURE — A9270 NON-COVERED ITEM OR SERVICE: HCPCS | Performed by: HOSPITALIST

## 2018-06-27 PROCEDURE — 85025 COMPLETE CBC W/AUTO DIFF WBC: CPT

## 2018-06-27 PROCEDURE — 36415 COLL VENOUS BLD VENIPUNCTURE: CPT

## 2018-06-27 PROCEDURE — 770006 HCHG ROOM/CARE - MED/SURG/GYN SEMI*

## 2018-06-27 PROCEDURE — 700111 HCHG RX REV CODE 636 W/ 250 OVERRIDE (IP): Performed by: INTERNAL MEDICINE

## 2018-06-27 PROCEDURE — 99232 SBSQ HOSP IP/OBS MODERATE 35: CPT | Performed by: HOSPITALIST

## 2018-06-27 RX ADMIN — ENOXAPARIN SODIUM 40 MG: 100 INJECTION SUBCUTANEOUS at 08:29

## 2018-06-27 RX ADMIN — ASPIRIN 325 MG: 325 TABLET ORAL at 08:29

## 2018-06-27 RX ADMIN — ATORVASTATIN CALCIUM 40 MG: 40 TABLET, FILM COATED ORAL at 19:46

## 2018-06-27 ASSESSMENT — ENCOUNTER SYMPTOMS
STRIDOR: 0
COUGH: 0
PHOTOPHOBIA: 0
CLAUDICATION: 0
MYALGIAS: 0
HEADACHES: 0
HEMOPTYSIS: 0
TINGLING: 0
DEPRESSION: 0
SENSORY CHANGE: 0
NERVOUS/ANXIOUS: 0
TREMORS: 0
WEAKNESS: 0
BLOOD IN STOOL: 0
FEVER: 0
PND: 0
SORE THROAT: 0
DIZZINESS: 0
ORTHOPNEA: 0
VOMITING: 0
BACK PAIN: 0
SPUTUM PRODUCTION: 0
CHILLS: 0
SHORTNESS OF BREATH: 0
BLURRED VISION: 0
NECK PAIN: 0
EYE PAIN: 0
NAUSEA: 0
CONSTIPATION: 0
HEARTBURN: 0
PALPITATIONS: 0
MEMORY LOSS: 0
DOUBLE VISION: 0
SPEECH CHANGE: 0

## 2018-06-27 ASSESSMENT — PAIN SCALES - GENERAL
PAINLEVEL_OUTOF10: 0

## 2018-06-27 NOTE — CARE PLAN
Problem: Infection  Goal: Will remain free from infection  Outcome: PROGRESSING AS EXPECTED  No s/sx of infection at this time.    Problem: Mobility  Goal: Risk for activity intolerance will decrease    Intervention: Assess and monitor signs of activity intolerance  Pt ok to ambulate independently.

## 2018-06-27 NOTE — PROGRESS NOTES
Pt. A&Ox4 with a little short term memory loss. Pt. Resting comfortably in bed reading a magazine in her own clothing. Up self and independent. Prefers to go by the name Calderon.  , [place]ne, is at bedside.

## 2018-06-27 NOTE — PROGRESS NOTES
[facility] Medicine Daily Progress Note    Date of Service  6/27/2018    Chief Complaint  67 y.o. female admitted 6/24/2018 for evaluation of difficulty word finding.    Interval Problem Update  Symptoms of aphasia and word finding difficulty resolved.  MRI: neg for acute infarct.  Neurology to consult.     Consultants/Specialty  Neurology    Disposition  TBD    Review of Systems  Review of Systems   Constitutional: Negative for chills, fever and malaise/fatigue.   HENT: Negative for congestion, hearing loss, sore throat and tinnitus.    Eyes: Negative for blurred vision, double vision, photophobia and pain.   Respiratory: Negative for cough, hemoptysis, sputum production, shortness of breath and stridor.    Cardiovascular: Negative for chest pain, palpitations, orthopnea, claudication and PND.   Gastrointestinal: Negative for blood in stool, constipation, heartburn, melena, nausea and vomiting.   Genitourinary: Negative for dysuria, frequency and urgency.   Musculoskeletal: Negative for back pain, myalgias and neck pain.   Neurological: Negative for dizziness, tingling, tremors, sensory change, speech change, weakness and headaches.   Psychiatric/Behavioral: Negative for depression, memory loss and suicidal ideas. The patient is not nervous/anxious.         Physical Exam  Blood Pressure : 119/65   Temperature: 37.6 °C (99.6 °F)   Pulse: 70   Respiration: 16   Pulse Oximetry: 97 %     Physical Exam   Constitutional: She is oriented to person, place, and time. She appears well-developed and well-nourished. No distress.   HENT:   Head: Normocephalic and atraumatic.   Mouth/Throat: No oropharyngeal exudate.   Eyes: Conjunctivae are normal. Pupils are equal, round, and reactive to light. Right eye exhibits no discharge. No scleral icterus.   Neck: Neck supple. No JVD present. No thyromegaly present.   Cardiovascular: Normal rate and intact distal pulses.    No murmur heard.  Pulmonary/Chest: Effort normal and breath  sounds normal. No stridor. No respiratory distress. She has no wheezes. She has no rales. She exhibits no tenderness.   Abdominal: Soft. Bowel sounds are normal. She exhibits no distension. There is no tenderness. There is no rebound.   Musculoskeletal: Normal range of motion. She exhibits no edema.   Neurological: She is alert and oriented to person, place, and time. No cranial nerve deficit.   Skin: Skin is warm. She is not diaphoretic. No erythema.   Psychiatric: She has a normal mood and affect. Her behavior is normal. Thought content normal.       Fluids    Intake/Output Summary (Last 24 hours) at 06/27/18 0836  Last data filed at 06/26/18 1200   Gross per 24 hour   Intake              415 ml   Output              975 ml   Net             -560 ml       Laboratory  Recent Labs      06/25/18 0415  06/26/18   0625 06/27/18   0511   WBC  8.4  6.7  7.2   RBC  3.96*  4.02*  4.14*   HEMOGLOBIN  12.5  12.6  13.0   HEMATOCRIT  35.9*  36.9*  37.9   MCV  90.7  91.8  91.5   MCH  31.6  31.3  31.4   MCHC  34.8  34.1  34.3   RDW  41.4  42.1  42.0   PLATELETCT  279  246  295   MPV  10.2  10.1  10.1     Recent Labs      06/25/18 0415   06/26/18   0625  06/26/18   1250  06/27/18   0511   SODIUM  138   < >  132*  136  138   POTASSIUM  3.8   --   4.0   --   3.7   CHLORIDE  104   --   103   --   106   CO2  27   --   22   --   23   GLUCOSE  109*   --   99   --   95   BUN  8   --   8   --   10   CREATININE  0.66   --   0.56   --   0.60   CALCIUM  8.9   --   8.3*   --   8.6    < > = values in this interval not displayed.     Recent Labs      06/24/18 2055   INR  1.05         Recent Labs      06/24/18 2055   TRIGLYCERIDE  45   HDL  90   LDL  77       Imaging  Echocardiogram Comp W/O cont   Final Result      MR-BRAIN-W/O   Final Result      1.  No acute abnormality.   2.  Moderate cerebral atrophy.   3.  Moderate chronic microvascular ischemic disease.      CT-CTA HEAD WITH & W/O-POST PROCESS   Final Result         1.  Filling  defect in the proximal right M1 segment, appears to likely represent small nonocclusive thrombus.   2.  Otherwise CT angiogram of the [place] within normal limits.      These findings were discussed with the patient's clinician, Jono De Leon, on 6/24/2018 9:38 PM.      CT-CTA NECK WITH & W/O-POST PROCESSING   Final Result         1.  CT angiogram of the neck within normal limits.      CT-CEREBRAL PERFUSION ANALYSIS   Final Result         1.  CT perfusion examination over the limited section of brain reveals 0 mL of brain parenchyma has less than 30% of cerebral blood flow (CBF).      2.  Please note that the cerebral perfusion was performed on the limited brain tissue around the basal ganglia region. Infarct/ischemia outside the CT perfusion sections can be missed in this study.      CT-HEAD W/O   Final Result         1.  No acute intracranial abnormality is identified, there are changes of small vessel ischemic disease with mild atrophy noted.      DX-CHEST-LIMITED (1 VIEW)   Final Result         1.  No acute cardiopulmonary disease.   2.  Hyperexpansion of lungs favors changes of COPD.           Assessment/Plan  * Aphasia- (present on admission)   Assessment & Plan    Status post TPA  Aphasia resolved  CTA showed nonocclusive thrombus in the right M1 segment  MRI does not demonstrate any area of acute infarction  Continue aspirin high intensity statin for neuro protective measures. Echocardiogram: Normal right and left ventricular size and function.         Paroxysmal atrial fibrillation (HCC)- (present on admission)   Assessment & Plan    Patient's heart rate is in normal sinus  Rate controlled. Not on AV gurinder blockade medications.  CTM on telemetry,   Will need anticoagulation and event monitor as symptoms of stroke probably 2/2 to PAF??  Loop recorder? May consider cardiology consult.        Hyponatremia- (present on admission)   Assessment & Plan    Sodium 138  Corrected.      Cognitive deficits-  (present on admission)   Assessment & Plan    Hx of short-term memory loss  Outpatient follow-up        Hyperglycemia- (present on admission)   Assessment & Plan    A1c 5.7        Patient plan of care discussed at multidisplinary team rounds and with patient and R.N at beside.

## 2018-06-27 NOTE — ASSESSMENT & PLAN NOTE
Per chart patient has history of atrial fibrillation, she presents with probable TIA  Currently rate controlled sinus rhythm  Anticoagulation is appropriate

## 2018-06-27 NOTE — CONSULTS
CC:  Think was having a stroke    Date of Admission: 6/24/2018    Today's Date: 06/27/18    Consulting Physician: Getachew Ahmadi M.D.      HPI:    Azul Milan is a 67 y.o. female was hiking Sunday not able to fully keep up on fluids very hot at [place] altitude all day, later around 1930 sudden severe speech difficulty unable to get out words and thoughts disjointed no improvement with time but did improve after IVT now normal.  No worsening factor. Lasted hours. No SOB or palpatations.  No other complaints.       ROS:   Constitutional: No fevers or chills.  Eyes: No blurry vision or eye pain.  ENT: No dysphagia or hearing loss.  Respiratory: No cough or shortness of breath.  Cardiovascular: No chest pain or palpitations.  GI: No nausea, vomiting, or diarrhea.  : No urinary incontinence or dysuria.  Musculoskeletal: No joint swelling or arthralgias.  Skin: No skin rashes.  Neuro: See HPI.  Endocrine: No heat or cold intolerance. No polydipsia or polyuria.  Psych: No depression or anxiety.  Heme/Lymph: No easy bruising or swollen lymph nodes.  All other systems were reviewed and were negative.       Past Medical History:   Past Medical History:   Diagnosis Date   • A-fib (HCC)    • Hypokalemia    • Hypotension    • Syncope        Past Surgical History:   Past Surgical History:   Procedure Laterality Date   • COLONOSCOPY     • ENDOSCOPY     • TONSILLECTOMY     • TUBAL COAGULATION LAPAROSCOPIC BILATERAL         Social History:   Social History     Social History   • Marital status:      Spouse name: N/A   • Number of children: N/A   • Years of education: N/A     Occupational History   • Not on file.     Social History Main Topics   • Smoking status: Never Smoker   • Smokeless tobacco: Never Used   • Alcohol use No   • Drug use: No   • Sexual activity: Yes     Partners: Male     Other Topics Concern   • Not on file     Social History Narrative   • No narrative on file       Family History:    Family History   Problem Relation Age of Onset   • Dementia Mother    • Cancer Sister      breast   • Other Brother      wegeners grandulomatosis   • Lung Disease Paternal Grandfather      TB. Lung removal       Allergies:   Allergies   Allergen Reactions   • Other Food      Corn   • Penicillins Rash     Minor RASH         Current Facility-Administered Medications:   •  enoxaparin (LOVENOX) inj 40 mg, 40 mg, Subcutaneous, DAILY, Zeke Avalos Jr., M.D., 40 mg at 06/27/18 0829  •  atorvastatin (LIPITOR) tablet 40 mg, 40 mg, Oral, QHS, Omer Abernathy D.O., 40 mg at 06/26/18 2007  •  senna-docusate (PERICOLACE or SENOKOT S) 8.6-50 MG per tablet 2 Tab, 2 Tab, Oral, BID, 2 Tab at 06/26/18 2007 **AND** polyethylene glycol/lytes (MIRALAX) PACKET 1 Packet, 1 Packet, Oral, QDAY PRN **AND** magnesium hydroxide (MILK OF MAGNESIA) suspension 30 mL, 30 mL, Oral, QDAY PRN **AND** bisacodyl (DULCOLAX) suppository 10 mg, 10 mg, Rectal, QDAY PRN, Fina Galicia M.D.  •  Respiratory Care per Protocol, , Nebulization, Continuous RT, Fina Galicia M.D.  •  labetalol (NORMODYNE,TRANDATE) injection 10 mg, 10 mg, Intravenous, Q4HRS PRN **OR** hydrALAZINE (APRESOLINE) injection 10 mg, 10 mg, Intravenous, Q2HRS PRN, Fina Galicia M.D.  •  aspirin (ASA) tablet 325 mg, 325 mg, Oral, DAILY, 325 mg at 06/27/18 0829 **OR** aspirin (ASA) chewable tab 324 mg, 324 mg, Oral, DAILY **OR** aspirin (ASA) suppository 300 mg, 300 mg, Rectal, DAILY, Fina Galicia M.D.      PHYSICAL EXAM    Vitals:    06/26/18 2000 06/27/18 0000 06/27/18 0500 06/27/18 0810   BP: 137/79 122/76 119/65 130/65   Pulse: 68 72 70 77   Resp: 16 14 16 16   Temp: 37.2 °C (98.9 °F) 38 °C (100.4 °F) 37.6 °C (99.6 °F) 37.4 °C (99.3 °F)   TempSrc:       SpO2: 96% 98% 97% 97%   Weight:       Height:           Head/Neck: NCAT no meningismus neg kernig neg brudzinski. No obvious mass or heard bruit. No tender arteries or lost pulses.  No rash of head or neck.  Skin:  Warm, dry, intact. No rashes observed head/neck or body  Eyes/Funduscopic: unable    Mental Status: Awake, alert, oriented x 3. Names/repeats/fluent/follows commands. No neglect/extinction. Attention and concentration, recent & remote memory, Fund of Knowledge wnl.  Cranial Nerves: CN II-XII intact. PERRL.   No afferent pupillary defect. EOM full. VF full. sym grimace & eye closure. No nystagmus.     Motor: bulk/tone wnl. intact and sym. no abn mvmts.   Sensory: Intact light touch & sharp  Coordination: finger to nose & heel to shin intact.   DTR's: intact/sym. no clonus. Toes mute.  Gait/Station: n/a fall concern       Labs:  Recent Labs      06/25/18 0415 06/26/18 0625 06/27/18 0511   WBC  8.4  6.7  7.2   RBC  3.96*  4.02*  4.14*   HEMOGLOBIN  12.5  12.6  13.0   HEMATOCRIT  35.9*  36.9*  37.9   MCV  90.7  91.8  91.5   MCH  31.6  31.3  31.4   MCHC  34.8  34.1  34.3   RDW  41.4  42.1  42.0   PLATELETCT  279  246  295   MPV  10.2  10.1  10.1     Recent Labs      06/25/18 0415 06/26/18 0625 06/26/18 1250 06/27/18   0511   SODIUM  138   < >  132*  136  138   POTASSIUM  3.8   --   4.0   --   3.7   CHLORIDE  104   --   103   --   106   CO2  27   --   22   --   23   GLUCOSE  109*   --   99   --   95   BUN  8   --   8   --   10   CREATININE  0.66   --   0.56   --   0.60   CALCIUM  8.9   --   8.3*   --   8.6    < > = values in this interval not displayed.     Recent Labs      06/24/18 2055   INR  1.05         Recent Labs      06/24/18 2055   TROPONINI  <0.01     Recent Labs      06/24/18 2055   TRIGLYCERIDE  45   HDL  90   LDL  77     Recent Labs      06/25/18 0415 06/26/18 0625 06/26/18   1250  06/27/18   0511   SODIUM  138   < >  132*  136  138   POTASSIUM  3.8   --   4.0   --   3.7   CHLORIDE  104   --   103   --   106   CO2  27   --   22   --   23   GLUCOSE  109*   --   99   --   95   BUN  8   --   8   --   10    < > = values in this interval not displayed.     Recent Labs      06/25/18    0415   06/26/18   0625  06/26/18   1250  06/27/18   0511   SODIUM  138   < >  132*  136  138   POTASSIUM  3.8   --   4.0   --   3.7   CHLORIDE  104   --   103   --   106   CO2  27   --   22   --   23   BUN  8   --   8   --   10   CREATININE  0.66   --   0.56   --   0.60   CALCIUM  8.9   --   8.3*   --   8.6    < > = values in this interval not displayed.     Recent Labs      06/24/18 2055   INR  1.05     Results for orders placed or performed during the hospital encounter of 06/24/18   Echocardiogram Comp W/O cont   Result Value Ref Range    Eject.Frac. MOD BP 73.36     Eject.Frac. MOD 4C 67.41     Eject.Frac. MOD 2C 76.82     Left Ventrical Ejection Fraction 60               Imaging: neuroimaging reviewed and directly visualized by me  Echocardiogram Comp W/O cont   Final Result      MR-BRAIN-W/O   Final Result      1.  No acute abnormality.   2.  Moderate cerebral atrophy.   3.  Moderate chronic microvascular ischemic disease.      CT-CTA HEAD WITH & W/O-POST PROCESS   Final Result         1.  Filling defect in the proximal right M1 segment, appears to likely represent small nonocclusive thrombus.   2.  Otherwise CT angiogram of the [place] within normal limits.      These findings were discussed with the patient's clinician, Jono De Leon, on 6/24/2018 9:38 PM.      CT-CTA NECK WITH & W/O-POST PROCESSING   Final Result         1.  CT angiogram of the neck within normal limits.      CT-CEREBRAL PERFUSION ANALYSIS   Final Result         1.  CT perfusion examination over the limited section of brain reveals 0 mL of brain parenchyma has less than 30% of cerebral blood flow (CBF).      2.  Please note that the cerebral perfusion was performed on the limited brain tissue around the basal ganglia region. Infarct/ischemia outside the CT perfusion sections can be missed in this study.      CT-HEAD W/O   Final Result         1.  No acute intracranial abnormality is identified, there are changes of small  vessel ischemic disease with mild atrophy noted.      DX-CHEST-LIMITED (1 VIEW)   Final Result         1.  No acute cardiopulmonary disease.   2.  Hyperexpansion of lungs favors changes of COPD.      eeg INTERPRETATION:           ________________________________________________________________________     This is mildly abnormal routine video EEG recording in the awake and drowsy/sleep state(s).     This scalp EEG denotes                  1. Diffuse nonspecific cortical dysfunction - Mild      This nonspecific abnormalities could be secondary to metabolic, toxic, polypharmacy or even post-ictal     There are no epileptiform discharges in this record     Of note, unremarkable EEG does not completely exclude the diagnosis  of seizures since seizure is an episodic phenomena.  Clinical correlation may help     If clinical suspicion of seizure remains high.  Prolonged outpatient EEG   monitoring may be of help.     EEG 06/25/18 5:50 PM    Assessment/Plan:    TIA VS ABORTED STROKE S/P IVT BY TELENEURO MD ON 6/24  R M1 THROMBUS NOTED ON CTA, NO ROXANE PROCEDURE DONE APPARENTLY NO LVO ONLY PARTIAL OCCLUSION SMALL MURAL THROMBUS     DEHYDRATION AT ALTITUDE Sunday was a likely trigger, but must r/o cardioembolism with AF hx    NIHSS score:  1a. LOC:   1b. LOC Questions:   1c. LOC Commands:   2. Best Gaze:   3. Visual Fields:   4. Facial Paresis:   5a. Motor arm left:   5b. Motor arm right:   6a. Motor leg left:   6b. Motor leg right:   7. Sensory:  8. Best Language:   9. Limb Ataxia:   10. Dysarthria:   11. Extinction/Inattention:     Total NIHSS: 0      Presumed mechanism by TOAST:  __Large Artery Atherosclerosis  __Small Vessel (Lacunar)  _X_Cardioembolic  __Other (Sickle Cell, Vasculitis, Hypercoagulable)  __Unknown        TTE with bubble, telemetry. Card MD consult for need for long term ICM or NAHID  Stroke labs include lipids/TFT/a1c/trop/UA    Aspirin 325MG PO q daily. If need switch to anticoagulation, ok to start asap  with no injury on MRB.  STATIN FULL DOSE CRESTOR OR LIPITOR  ACEI/ARB, HCTZ, AND/OR CCB FOR JNC GOALS   BG MONITOR/CNTRL, DIET & LIFESTYLE/AVOID SMOKING DISCUSSED    No further neuro workup as inpatient if aforementioned studies WNL & maintains neuro baseline.  Neuro sign off, reconsult PRN.  F/u otpt Neuro ASAP.         Merlin Mcleod M.D.  , Neurohospitalist & Stroke  Clinical Professor, [employer]  Diplomate, Neurology & Neuroimaging

## 2018-06-27 NOTE — PROGRESS NOTES
Patient alert and oriented to self, place, and situation, disoriented to time.  She has not complained of any pain and vs are stable. She is dressed in her own clothes, and has a visitor at her bedside.

## 2018-06-28 ENCOUNTER — HOME HEALTH ADMISSION (OUTPATIENT)
Dept: HOME HEALTH SERVICES | Facility: HOME HEALTHCARE | Age: 68
End: 2018-06-28
Payer: MEDICARE

## 2018-06-28 VITALS
BODY MASS INDEX: 19.65 KG/M2 | TEMPERATURE: 98.3 F | OXYGEN SATURATION: 99 % | SYSTOLIC BLOOD PRESSURE: 116 MMHG | WEIGHT: 125.22 LBS | HEART RATE: 66 BPM | HEIGHT: 67 IN | DIASTOLIC BLOOD PRESSURE: 66 MMHG | RESPIRATION RATE: 17 BRPM

## 2018-06-28 PROBLEM — I48.0 PAROXYSMAL ATRIAL FIBRILLATION (HCC): Status: RESOLVED | Noted: 2018-06-26 | Resolved: 2018-06-28

## 2018-06-28 PROBLEM — E87.1 HYPONATREMIA: Status: RESOLVED | Noted: 2018-06-25 | Resolved: 2018-06-28

## 2018-06-28 PROBLEM — R47.01 APHASIA: Status: RESOLVED | Noted: 2018-06-25 | Resolved: 2018-06-28

## 2018-06-28 PROBLEM — R73.9 HYPERGLYCEMIA: Status: RESOLVED | Noted: 2018-06-25 | Resolved: 2018-06-28

## 2018-06-28 LAB
ALBUMIN SERPL BCP-MCNC: 3.8 G/DL (ref 3.2–4.9)
ALBUMIN/GLOB SERPL: 1.5 G/DL
ALP SERPL-CCNC: 58 U/L (ref 30–99)
ALT SERPL-CCNC: 19 U/L (ref 2–50)
ANION GAP SERPL CALC-SCNC: 6 MMOL/L (ref 0–11.9)
AST SERPL-CCNC: 16 U/L (ref 12–45)
BASOPHILS # BLD AUTO: 0.7 % (ref 0–1.8)
BASOPHILS # BLD: 0.05 K/UL (ref 0–0.12)
BILIRUB SERPL-MCNC: 0.5 MG/DL (ref 0.1–1.5)
BUN SERPL-MCNC: 12 MG/DL (ref 8–22)
CALCIUM SERPL-MCNC: 8.7 MG/DL (ref 8.5–10.5)
CHLORIDE SERPL-SCNC: 106 MMOL/L (ref 96–112)
CO2 SERPL-SCNC: 27 MMOL/L (ref 20–33)
CREAT SERPL-MCNC: 0.77 MG/DL (ref 0.5–1.4)
EKG IMPRESSION: NORMAL
EOSINOPHIL # BLD AUTO: 0.21 K/UL (ref 0–0.51)
EOSINOPHIL NFR BLD: 2.9 % (ref 0–6.9)
ERYTHROCYTE [DISTWIDTH] IN BLOOD BY AUTOMATED COUNT: 43.3 FL (ref 35.9–50)
GLOBULIN SER CALC-MCNC: 2.6 G/DL (ref 1.9–3.5)
GLUCOSE SERPL-MCNC: 116 MG/DL (ref 65–99)
HCT VFR BLD AUTO: 38.6 % (ref 37–47)
HGB BLD-MCNC: 13 G/DL (ref 12–16)
IMM GRANULOCYTES # BLD AUTO: 0.01 K/UL (ref 0–0.11)
IMM GRANULOCYTES NFR BLD AUTO: 0.1 % (ref 0–0.9)
LYMPHOCYTES # BLD AUTO: 2.49 K/UL (ref 1–4.8)
LYMPHOCYTES NFR BLD: 34.9 % (ref 22–41)
MCH RBC QN AUTO: 31.1 PG (ref 27–33)
MCHC RBC AUTO-ENTMCNC: 33.7 G/DL (ref 33.6–35)
MCV RBC AUTO: 92.3 FL (ref 81.4–97.8)
MONOCYTES # BLD AUTO: 0.61 K/UL (ref 0–0.85)
MONOCYTES NFR BLD AUTO: 8.6 % (ref 0–13.4)
NEUTROPHILS # BLD AUTO: 3.76 K/UL (ref 2–7.15)
NEUTROPHILS NFR BLD: 52.8 % (ref 44–72)
NRBC # BLD AUTO: 0 K/UL
NRBC BLD-RTO: 0 /100 WBC
PLATELET # BLD AUTO: 284 K/UL (ref 164–446)
PMV BLD AUTO: 9.8 FL (ref 9–12.9)
POTASSIUM SERPL-SCNC: 3.9 MMOL/L (ref 3.6–5.5)
PROT SERPL-MCNC: 6.4 G/DL (ref 6–8.2)
RBC # BLD AUTO: 4.18 M/UL (ref 4.2–5.4)
SODIUM SERPL-SCNC: 139 MMOL/L (ref 135–145)
WBC # BLD AUTO: 7.1 K/UL (ref 4.8–10.8)

## 2018-06-28 PROCEDURE — 700102 HCHG RX REV CODE 250 W/ 637 OVERRIDE(OP): Performed by: HOSPITALIST

## 2018-06-28 PROCEDURE — A9270 NON-COVERED ITEM OR SERVICE: HCPCS | Performed by: HOSPITALIST

## 2018-06-28 PROCEDURE — 700111 HCHG RX REV CODE 636 W/ 250 OVERRIDE (IP): Performed by: INTERNAL MEDICINE

## 2018-06-28 PROCEDURE — 85025 COMPLETE CBC W/AUTO DIFF WBC: CPT

## 2018-06-28 PROCEDURE — 99239 HOSP IP/OBS DSCHRG MGMT >30: CPT | Performed by: HOSPITALIST

## 2018-06-28 PROCEDURE — 80053 COMPREHEN METABOLIC PANEL: CPT

## 2018-06-28 PROCEDURE — 36415 COLL VENOUS BLD VENIPUNCTURE: CPT

## 2018-06-28 RX ADMIN — STANDARDIZED SENNA CONCENTRATE AND DOCUSATE SODIUM 2 TABLET: 8.6; 5 TABLET, FILM COATED ORAL at 09:09

## 2018-06-28 RX ADMIN — ENOXAPARIN SODIUM 40 MG: 100 INJECTION SUBCUTANEOUS at 09:13

## 2018-06-28 RX ADMIN — ASPIRIN 325 MG: 325 TABLET ORAL at 09:12

## 2018-06-28 RX ADMIN — METOPROLOL TARTRATE 12.5 MG: 25 TABLET, FILM COATED ORAL at 09:10

## 2018-06-28 NOTE — DISCHARGE PLANNING
ATTN: Case Management  RE: Referral for Home Health                We would like to take this opportunity to thank you for submitting a referral for your patient to continue care with [facility]. Our skilled team is dedicated to helping all patients recover and gain independence in the home setting.            As of 6/28/18, we have accepted the above patient into our service. A [facility] clinician will be out to see the patient within 48 hours to conduct our initial visit. If you have any questions or concerns regarding the patient’s transition to Home Health, please do not hesitate to contact us. We are open for referrals 7 days a week from 8AM to 5PM at [phone].      We look forward to collaborating with you,  [facility] Team

## 2018-06-28 NOTE — DISCHARGE SUMMARY
Discharge Summary    CHIEF COMPLAINT ON ADMISSION  Chief Complaint   Patient presents with   • Altered Mental Status     starting around 2000       Reason for Admission  Confusion; Difficulty Talking     Admission Date  6/24/2018    CODE STATUS  Full Code    HPI & HOSPITAL COURSE  67 y.o. female admitted 6/24/2018 for evaluation of difficulty word finding. On admission patient underwent emergent CTA of the head and neck which revealed a Filling defect in the proximal right M1 segment, appears to likely represent small nonocclusive thrombus. In addition to a  CT perfusion examination over the limited section of brain reveals 0 mL of brain parenchyma has less than 30% of cerebral blood flow (CBF).  As a result patient underwent TPA infusion, was placed in the ICU for close monitoring. Neurology was consulted. Patient was closely monitored on telemetry for any acute arrhythmias. And luckily after TPA infusion her aphasia did resolve. At this point given that the patient is fairly healthy is on very minimal medications and has always lived a very active healthy lifestyle was concerned about this nonocclusive thrombus formation. Hence monitoring closely on telemetry with did notice that she had several episodes of SVT and SVT in the past. However my concern was that maybe patient does have atrial fibrillation from time to time, which may predispose her to this event. As a result cardiology was consulted further recommendations they believe that perhaps she is having PAF, and would highly benefit from anticoagulation therapy, I also discussed possibility of using a loop recorder however cardiology did not believe this would provide us with a great yield. Case discussed with neurology who is agreement with starting anticoagulation as patient did not suffer any acute CVA that would make us suspicious of possible hemorrhagic conversion with the use of anticoagulation. At this point patient has been walking fast across the  halls without any difficulty. Patient denies having any vision changes headaches chest pain shortness of breath or nausea vomiting.    We have made an outpatient appointment for her to see a neurologist in addition to a cardiologist    Therefore, she is discharged in good and stable condition to home with close outpatient follow-up.    The patient met 2-midnight criteria for an inpatient stay at the time of discharge.    Discharge Date  6/28/2018    FOLLOW UP ITEMS POST DISCHARGE  PCP in 1 week  Neurology per schedulers.  Cardiology as directed     DISCHARGE DIAGNOSES  Principal Problem (Resolved):    Aphasia POA: Yes  Active Problems:    Cognitive deficits POA: Yes  Resolved Problems:    Hyponatremia POA: Yes    Paroxysmal atrial fibrillation (HCC) POA: Yes    Hyperglycemia POA: Yes      FOLLOW UP  Future Appointments  Date Time Provider Department Center   7/12/2018 10:35 AM Joey Trevino M.D. [facility]   9/17/2018 2:00 PM Aiden Castelan M.D. [facility] None     No follow-up provider specified.    MEDICATIONS ON DISCHARGE     Medication List      START taking these medications      Instructions   apixaban 5mg Tabs  Commonly known as:  ELIQUIS   Take 1 Tab by mouth 2 Times a Day.  Dose:  5 mg     metoprolol 25 MG Tabs  Commonly known as:  LOPRESSOR   Take 0.5 Tabs by mouth 2 Times a Day.  Dose:  12.5 mg        CONTINUE taking these medications      Instructions   B COMPLEX 1 PO   Take  by mouth.     calcium citrate 950 MG Tabs  Commonly known as:  CALCITRATE   Take 950 mg by mouth every day.  Dose:  950 mg     ferrous sulfate 325 (65 Fe) MG tablet   Take 1 Tab by mouth every day.  Dose:  325 mg     fish oil 1000 MG Caps capsule   Take 1,000 mg by mouth every day.  Dose:  1000 mg     magnesium gluconate 500 MG tablet  Commonly known as:  MAG-G   Take 500 mg by mouth 3 times a day.  Dose:  500 mg     OCUVITE PO   Take 1 Tab by mouth every day.  Dose:  1 Tab     POTASSIUM PO   Take 1 Tab by mouth every day.  Dose:  1  Tab     Vitamin D (Cholecalciferol) 1000 units Caps   Take 1,000 Units by mouth every day.  Dose:  1000 Units     Zinc 50 MG Tabs   Take 50 mg by mouth every day.  Dose:  50 mg        STOP taking these medications    aspirin EC 81 MG Tbec  Commonly known as:  ECOTRIN            Allergies  Allergies   Allergen Reactions   • Other Food      Corn   • Penicillins Rash     Minor RASH       DIET  Orders Placed This Encounter   Procedures   • Diet Order Regular     Standing Status:   Standing     Number of Occurrences:   1     Order Specific Question:   Diet:     Answer:   Regular [1]     Order Specific Question:   Consistency/Fluid modifications:     Answer:   Thin Liquids [3]   • DISCONTINUE DIET TRAY     Standing Status:   Standing     Number of Occurrences:   1       ACTIVITY  As tolerated.  Weight bearing as tolerated    CONSULTATIONS  Neurology  Cardiology    PROCEDURES  None    LABORATORY  Lab Results   Component Value Date    SODIUM 139 06/28/2018    POTASSIUM 3.9 06/28/2018    CHLORIDE 106 06/28/2018    CO2 27 06/28/2018    GLUCOSE 116 (H) 06/28/2018    BUN 12 06/28/2018    CREATININE 0.77 06/28/2018        Lab Results   Component Value Date    WBC 7.1 06/28/2018    HEMOGLOBIN 13.0 06/28/2018    HEMATOCRIT 38.6 06/28/2018    PLATELETCT 284 06/28/2018        Total time of the discharge process exceeds 40 minutes.

## 2018-06-28 NOTE — DISCHARGE PLANNING
Anticipated Discharge Disposition: Home w/ HH    Action: LSW made tc to pt's Pharmacy ([id number] on [address]) [phone]. Eliquis will be $404.65. Pharmacist suggest going to [email or web address] for a coupon.     This worker printed off a coupon for pt's use.     MD made aware of cost. Pt has been discharged.     LSW faxed coupon to pt's [facility] pharmacy at [phone]      Barriers to Discharge: none    Plan: Pt to dc home w/ HH

## 2018-06-28 NOTE — PROGRESS NOTES
Patient discharged with family. Discharge instructions and stroke, Afib,tachycardia,beta blocker education reviewed with patient and family. Vss. Ambulatory PIV removed.

## 2018-06-28 NOTE — CONSULTS
Cardiology Consult Note:    Stanton To  Date & Time note created:    6/28/2018   12:17 PM     Referring MD:  Dr. Ahmadi    Patient ID:   Name:             Azul Milan   YOB: 1950  Age:                 67 y.o.  female   MRN:               [id number]                                                             Chief Complaint / Reason for consult:  SVT with Stroke and the need for loop recorder implant.    History of Present Illness:    This is a 67 years old woman who presented to the hospital with acute stroke.  Patient received TPA successfully.  Cardiology is been consulted for assessment of implantable loop recorder.  I reviewed the actual rhythm strip on telemetry.  At 6 AM and 7 AM this morning, patient had paroxysmal SVT suggestive of atrial arrhythmias.  Her patient's daughter's report, one year ago, patient presented to the ER and was told by 1 of the ER physician that he saw atrial fibrillation on patient's telemetry strip at the time.  I do not have any records of that at this time.      Otherwise, no prior cardiac condition or issues.  No prior cardiac surgeries.  No family history of sudden cardiac death.    Patient is very active.  She rides bicycles with her  and long distances frequently.    I have independently reviewed patient's ECG with patient in clinic today, which shows normal sinus rhythm, normal TN, QT intervals. No evidence of acute coronary syndrome.    I have independently reviewed echocardiogram's actual images with patient in clinic which shows normal left ventricular systolic function. No wall motion abnormality. No evidence of pulmonary hypertension. No significant valvular disease.    Review of Systems:      Constitutional: Denies fevers, Denies weight changes  Eyes: Denies changes in vision, no eye pain  Ears/Nose/Throat/Mouth: Denies nasal congestion or sore throat   Cardiovascular: no chest pain, no palpitations   Respiratory: no  shortness of breath , Denies cough  Gastrointestinal/Hepatic: Denies abdominal pain, nausea, vomiting, diarrhea, constipation or GI bleeding   Genitourinary: Denies dysuria or frequency  Musculoskeletal/Rheum: Denies  joint pain and swelling   Skin: Denies rash  Neurological: Denies headache, confusion, memory loss or focal weakness/parasthesias  Psychiatric: denies mood disorder   Endocrine: Cleopatra thyroid problems  Heme/Oncology/Lymph Nodes: Denies enlarged lymph nodes, denies brusing or known bleeding disorder  All other systems were reviewed and are negative (AMA/CMS criteria)                Past Medical History:   Past Medical History:   Diagnosis Date   • A-fib (HCC)    • Hypokalemia    • Hypotension    • Syncope      Active Hospital Problems    Diagnosis   • Aphasia [R47.01]     Priority: High   • Paroxysmal atrial fibrillation (HCC) [I48.0]     Priority: Medium   • Hyponatremia [E87.1]     Priority: Medium   • Cognitive deficits [R41.89]     Priority: Medium   • Hyperglycemia [R73.9]     Priority: Low       Past Surgical History:  Past Surgical History:   Procedure Laterality Date   • COLONOSCOPY     • ENDOSCOPY     • TONSILLECTOMY     • TUBAL COAGULATION LAPAROSCOPIC BILATERAL         Hospital Medications:    Current Facility-Administered Medications:   •  metoprolol (LOPRESSOR) tablet 12.5 mg, 12.5 mg, Oral, TWICE DAILY, Getachew Ahmadi M.D., 12.5 mg at 06/28/18 0910  •  enoxaparin (LOVENOX) inj 40 mg, 40 mg, Subcutaneous, DAILY, Zeke Avalos Jr., M.D., 40 mg at 06/28/18 0913  •  atorvastatin (LIPITOR) tablet 40 mg, 40 mg, Oral, QHS, Omer Abernathy D.O., 40 mg at 06/27/18 1946  •  senna-docusate (PERICOLACE or SENOKOT S) 8.6-50 MG per tablet 2 Tab, 2 Tab, Oral, BID, 2 Tab at 06/28/18 0909 **AND** polyethylene glycol/lytes (MIRALAX) PACKET 1 Packet, 1 Packet, Oral, QDAY PRN **AND** magnesium hydroxide (MILK OF MAGNESIA) suspension 30 mL, 30 mL, Oral, QDAY PRN **AND** bisacodyl (DULCOLAX)  suppository 10 mg, 10 mg, Rectal, QDAY PRN, Fina Galicia M.D.  •  Respiratory Care per Protocol, , Nebulization, Continuous RT, Fina Galicia M.D.  •  labetalol (NORMODYNE,TRANDATE) injection 10 mg, 10 mg, Intravenous, Q4HRS PRN **OR** [DISCONTINUED] hydrALAZINE (APRESOLINE) injection 10 mg, 10 mg, Intravenous, Q2HRS PRN, Fina Galicia M.D.  •  aspirin (ASA) tablet 325 mg, 325 mg, Oral, DAILY, 325 mg at 06/28/18 0912 **OR** aspirin (ASA) chewable tab 324 mg, 324 mg, Oral, DAILY **OR** aspirin (ASA) suppository 300 mg, 300 mg, Rectal, DAILY, Fina Galicia M.D.    Current Outpatient Medications:  Prescriptions Prior to Admission   Medication Sig Dispense Refill Last Dose   • B Complex Vitamins (B COMPLEX 1 PO) Take  by mouth.   6/24/2018 at Unknown time   • magnesium gluconate (MAG-G) 500 MG tablet Take 500 mg by mouth 3 times a day.   6/24/2018 at Unknown time   • Multiple Vitamins-Minerals (OCUVITE PO) Take 1 Tab by mouth every day.   6/24/2018 at Unknown time   • POTASSIUM PO Take 1 Tab by mouth every day.   6/24/2018 at Unknown time   • ferrous sulfate 325 (65 FE) MG tablet Take 1 Tab by mouth every day. 30 Tab 0 6/24/2018 at Unknown time   • Omega-3 Fatty Acids (FISH OIL) 1000 MG Cap capsule Take 1,000 mg by mouth every day.   6/24/2018 at Unknown time   • calcium citrate (CALCITRATE) 950 MG Tab Take 950 mg by mouth every day.   6/24/2018 at Unknown time   • Zinc 50 MG Tab Take 50 mg by mouth every day.   6/24/2018 at Unknown time   • Vitamin D, Cholecalciferol, 1000 UNITS Cap Take 1,000 Units by mouth every day.   6/24/2018 at Unknown time   • aspirin EC (ECOTRIN) 81 MG Tablet Delayed Response Take 81 mg by mouth every day.   6/24/2018 at Unknown time       Medication Allergy:  Allergies   Allergen Reactions   • Other Food      Corn   • Penicillins Rash     Minor RASH       Family History:  Family History   Problem Relation Age of Onset   • Dementia Mother    • Cancer Sister      breast   • Other  "Brother      wegeners grandulomatosis   • Lung Disease Paternal Grandfather      TB. Lung removal       Social History:  Social History     Social History   • Marital status:      Spouse name: N/A   • Number of children: N/A   • Years of education: N/A     Occupational History   • Not on file.     Social History Main Topics   • Smoking status: Never Smoker   • Smokeless tobacco: Never Used   • Alcohol use No   • Drug use: No   • Sexual activity: Yes     Partners: Male     Other Topics Concern   • Not on file     Social History Narrative   • No narrative on file         Physical Exam:  Vitals/ General Appearance:   Weight/BMI: Body mass index is 19.61 kg/m².  Blood pressure 120/85, pulse 80, temperature 36.4 °C (97.5 °F), resp. rate 16, height 1.702 m (5' 7\"), weight 56.8 kg (125 lb 3.5 oz), SpO2 97 %.  Vitals:    06/27/18 2000 06/28/18 0000 06/28/18 0400 06/28/18 0800   BP: 109/55 124/58 129/72 120/85   Pulse: 89 63 85 80   Resp: 16 16 16 16   Temp: 37.2 °C (98.9 °F) 36.3 °C (97.4 °F) 37.3 °C (99.1 °F) 36.4 °C (97.5 °F)   TempSrc:       SpO2: 99% 95% 98% 97%   Weight:       Height:         Oxygen Therapy:  Pulse Oximetry: 97 %, O2 (LPM): 0, O2 Delivery: None (Room Air)    Constitutional:   Well developed, Well nourished, No acute distress  HENMT:  Normocephalic, Atraumatic, Oropharynx moist mucous membranes, No oral exudates, Nose normal.  No thyromegaly.  Eyes:  EOMI, Conjunctiva normal, No discharge.  Neck:  Normal range of motion, No cervical tenderness,  no JVD.  Cardiovascular:  Normal heart rate, Normal rhythm, No murmurs, No rubs, No gallops.   Extremitites with intact distal pulses, no cyanosis, or edema.  Lungs:  Normal breath sounds, breath sounds clear to auscultation bilaterally,  no rales, no rhonchi, no wheezing.   Abdomen: Bowel sounds normal, Soft, No tenderness, No guarding, No rebound, No masses, No hepatosplenomegaly.  Skin: Warm, Dry, No erythema, No rash, no induration.  Neurologic: " dementia, No focal deficits noted, cranial nerves II through X are intact.  Psychiatric: baseline dementia.    MDM (Data Review):     Records reviewed and summarized in current documentation    Lab Data Review:  Recent Results (from the past 24 hour(s))   CBC with Differential    Collection Time: 06/28/18  4:07 AM   Result Value Ref Range    WBC 7.1 4.8 - 10.8 K/uL    RBC 4.18 (L) 4.20 - 5.40 M/uL    Hemoglobin 13.0 12.0 - 16.0 g/dL    Hematocrit 38.6 37.0 - 47.0 %    MCV 92.3 81.4 - 97.8 fL    MCH 31.1 27.0 - 33.0 pg    MCHC 33.7 33.6 - 35.0 g/dL    RDW 43.3 35.9 - 50.0 fL    Platelet Count 284 164 - 446 K/uL    MPV 9.8 9.0 - 12.9 fL    Neutrophils-Polys 52.80 44.00 - 72.00 %    Lymphocytes 34.90 22.00 - 41.00 %    Monocytes 8.60 0.00 - 13.40 %    Eosinophils 2.90 0.00 - 6.90 %    Basophils 0.70 0.00 - 1.80 %    Immature Granulocytes 0.10 0.00 - 0.90 %    Nucleated RBC 0.00 /100 WBC    Neutrophils (Absolute) 3.76 2.00 - 7.15 K/uL    Lymphs (Absolute) 2.49 1.00 - 4.80 K/uL    Monos (Absolute) 0.61 0.00 - 0.85 K/uL    Eos (Absolute) 0.21 0.00 - 0.51 K/uL    Baso (Absolute) 0.05 0.00 - 0.12 K/uL    Immature Granulocytes (abs) 0.01 0.00 - 0.11 K/uL    NRBC (Absolute) 0.00 K/uL   Comp Metabolic Panel (CMP)    Collection Time: 06/28/18  4:07 AM   Result Value Ref Range    Sodium 139 135 - 145 mmol/L    Potassium 3.9 3.6 - 5.5 mmol/L    Chloride 106 96 - 112 mmol/L    Co2 27 20 - 33 mmol/L    Anion Gap 6.0 0.0 - 11.9    Glucose 116 (H) 65 - 99 mg/dL    Bun 12 8 - 22 mg/dL    Creatinine 0.77 0.50 - 1.40 mg/dL    Calcium 8.7 8.5 - 10.5 mg/dL    AST(SGOT) 16 12 - 45 U/L    ALT(SGPT) 19 2 - 50 U/L    Alkaline Phosphatase 58 30 - 99 U/L    Total Bilirubin 0.5 0.1 - 1.5 mg/dL    Albumin 3.8 3.2 - 4.9 g/dL    Total Protein 6.4 6.0 - 8.2 g/dL    Globulin 2.6 1.9 - 3.5 g/dL    A-G Ratio 1.5 g/dL   ESTIMATED GFR    Collection Time: 06/28/18  4:07 AM   Result Value Ref Range    GFR If African American >60 >60 mL/min/1.73 m 2    GFR  If Non African American >60 >60 mL/min/1.73 m 2       Imaging/Procedures Review:    Chest Xray:  Reviewed    EKG:   As in HPI.     MDM (Assessment and Plan):     Active Hospital Problems    Diagnosis   • Aphasia [R47.01]     Priority: High   • Paroxysmal atrial fibrillation (HCC) [I48.0]     Priority: Medium   • Hyponatremia [E87.1]     Priority: Medium   • Cognitive deficits [R41.89]     Priority: Medium   • Hyperglycemia [R73.9]     Priority: Low         At this time, we certainly have enough evidence that patient had paroxysmal SVT.  In the setting of stroke, I do think that patient is indicated to be on long-term anticoagulation therapy.  Based on her lifestyle, I did go over the risk of bleeding with patient and her family members.  I do not think that there is an indication for loop recording at this time.  I do not think we should proceed with a transesophageal echocardiogram either.  I did explain to patient and her daughter about the overall plans.  Patient and family agreed.    Okay to be discharged from cardiac standpoint.  Start anticoagulation as soon as possible based on neurology assessment.      Thank you for referring this patient to our cardiology service.    Stanton Jeffrey MD.  Cardiology Inpatient Service.  [facility].  [phone].  Leti Dexter.

## 2018-06-28 NOTE — DISCHARGE PLANNING
Transitional Care Navigator:    TCN met with patient and family to discuss transitional care services for discharge planning.  Home health level of care has been recommended.  TCN explained home health level of care in detail.  Pt in agreement.  Choice is [facility] .  Choice form completed and faxed to [facility]. SW aware.  TCN will follow-up as needed.

## 2018-06-28 NOTE — DISCHARGE INSTRUCTIONS
Discharge Instructions    Discharged to home by car with relative. Discharged via walking, hospital escort: Yes.  Special equipment needed: Not Applicable    Be sure to schedule a follow-up appointment with your primary care doctor or any specialists as instructed.     Discharge Plan:   Influenza Vaccine Indication: Not indicated: Previously immunized this influenza season and > 8 years of age    I understand that a diet low in cholesterol, fat, and sodium is recommended for good health. Unless I have been given specific instructions below for another diet, I accept this instruction as my diet prescription.   Other diet: Regular      Special Instructions:     Stroke/CVA/TIA/Hemorrhagic Ischemia Discharge Instructions  You have had a stroke. Your risk factors have been identified as follows:  Atrial Fibrillation  It is important that you reduce your risk factors to avoid another stroke in the future. Here are some general guidelines to follow:  · Eat healthy - avoid food high in fat.  · Get regular exercise.  · Maintain a healthy weight.  · Avoid smoking.  · Avoid alcohol and illegal drug use.  · Take your medications as directed.  For more information regarding risk factors, refer to pages 17-19 in your Stroke Patient Education Guide. Stroke Education Guide was given to patient.    Warning signs of a stroke include (which can also be found on page 3 of your Stroke Patient Education Guide):  · Sudden numbness of weakness of the face, arm or leg (especially on one side of the body).  · Sudden confusion, trouble speaking or understanding.  · Sudden trouble seeing in one or both eyes.  · Sudden trouble walking, dizziness, loss of balance or coordination.  · Sudden severe headache with no known cause.  It is very important to get treatment quickly when a stroke occurs. If you experience any of the above warning signs, call 911 immediately.     Some patients who have had a stroke will be going home on a blood thinner  medication called Warfarin (Coumadin).  This medication requires very close monitoring and follow up.  This follow up can be provided by either your Primary Care Physician or by [facility]'s Outpatient Anticoagulation Service.  The Outpatient Anticoagulation Service is located at the [facility] at [facility] ([facility]).  If you do not know when your follow up appointment is scheduled, call [phone] to verify your appointment time.      · Is patient discharged on Warfarin / Coumadin?   No     Depression / Suicide Risk    As you are discharged from this [facility], it is important to learn how to keep safe from harming yourself.    Recognize the warning signs:  · Abrupt changes in personality, positive or negative- including increase in energy   · Giving away possessions  · Change in eating patterns- significant weight changes-  positive or negative  · Change in sleeping patterns- unable to sleep or sleeping all the time   · Unwillingness or inability to communicate  · Depression  · Unusual sadness, discouragement and loneliness  · Talk of wanting to die  · Neglect of personal appearance   · Rebelliousness- reckless behavior  · Withdrawal from people/activities they love  · Confusion- inability to concentrate     If you or a loved one observes any of these behaviors or has concerns about self-harm, here's what you can do:  · Talk about it- your feelings and reasons for harming yourself  · Remove any means that you might use to hurt yourself (examples: pills, rope, extension cords, firearm)  · Get professional help from the community (Mental Health, Substance Abuse, psychological counseling)  · Do not be alone:Call your Safe Contact- someone whom you trust who will be there for you.  · Call your local CRISIS HOTLINE 360-4830 or 602-535-2064  · Call your local Children's Mobile Crisis Response Team [place] (791) 387-5792 or  www.[employer]Mentor  · Call the toll free National Suicide Prevention Hotlines   · National Suicide Prevention Lifeline 564-596-MEMZ (4371)  · National Hope Line Network 800-SUICIDE (355-0327)

## 2018-06-28 NOTE — DISCHARGE PLANNING
Received Choice form at 1258  Agency/Facility Name: [facility]  Referral sent per Choice form at 1300.  Choice obtained by Chelle HA.

## 2018-06-28 NOTE — FACE TO FACE
Face to Face Supporting Documentation - Home Health    The encounter with this patient was in whole or in part the primary reason for home health admission.    Date of encounter:   Patient:                    MRN:                       YOB: 2019  Azul Mialn  [id number]  1950     Home health to see patient for:  Skilled Nursing care for assessment, interventions & education, Wound Care, Registered dietitian consult, Medical social work consult, Home health aide, Physical Therapy evaluation and treatment, Occupational therapy evaluation and treatment and Speech Language Pathology evaluation and treatment    Skilled need for:  New Onset Medical Diagnosis CVA    Skilled nursing interventions to include:  Comment: na    Homebound status evidenced by:  Need the aid of supportive devices such as crutches, canes, wheelchairs or walkers, Require the use of special transportation, Needs the assistance of another person in order to leave the home or Have a condition such that leaving his or her home is medically contraindicated. Leaving home requires a considerable and taxing effort. There is a normal inability to leave the home.    Community Physician to provide follow up care: Rachelle Isaac M.D.     Optional Interventions? No      I certify the face to face encounter for this home health care referral meets the CMS requirements and the encounter/clinical assessment with the patient was, in whole, or in part, for the medical condition(s) listed above, which is the primary reason for home health care. Based on my clinical findings: the service(s) are medically necessary, support the need for home health care, and the homebound criteria are met.  I certify that this patient has had a face to face encounter by myself.  Getachew Ahmadi M.D. - NPI: [id number]

## 2018-06-28 NOTE — PROGRESS NOTES
Spoke with Dr. Ahmadi at 0725 to discuss my concerns about SVT. Patient had an accelerated rhythm >150 at 0600 this morning and also at 0700. Both times were with ambulation and sustained while patient was ambulatory. Orders obtained for metoprolol. Education about medication provided to patient.

## 2018-06-29 ENCOUNTER — TELEPHONE (OUTPATIENT)
Dept: MEDICAL GROUP | Facility: PHYSICIAN GROUP | Age: 68
End: 2018-06-29

## 2018-06-29 NOTE — TELEPHONE ENCOUNTER
Received a fax from [facility].  The ER doctor prescribed the patient Eliquis.  They would like to know if this can be switched to something else.  The copay is too expensive for the patient.  Please advise.  Thank you.

## 2018-06-30 ENCOUNTER — HOME CARE VISIT (OUTPATIENT)
Dept: HOME HEALTH SERVICES | Facility: HOME HEALTHCARE | Age: 68
End: 2018-06-30

## 2018-07-03 NOTE — TELEPHONE ENCOUNTER
The pharmacy was contacted and she picked up the Eliquis for $246 for a 30 day supply.  The cheapest that was given was coumadin.  The pharmacy stated that they do not know which one would be the cheapest.  You would have to send over a prescription to see which one would cheaper.  Spoke to Tremayne, patient's spouse.  He was given this information.  He is asking if you can send over both and he will talk to the pharmacy and see which is cheaper.    Thank you.

## 2018-07-12 ENCOUNTER — OFFICE VISIT (OUTPATIENT)
Dept: MEDICAL GROUP | Facility: PHYSICIAN GROUP | Age: 68
End: 2018-07-12
Payer: MEDICARE

## 2018-07-12 VITALS
OXYGEN SATURATION: 99 % | BODY MASS INDEX: 19.78 KG/M2 | WEIGHT: 126 LBS | RESPIRATION RATE: 14 BRPM | SYSTOLIC BLOOD PRESSURE: 134 MMHG | HEART RATE: 57 BPM | TEMPERATURE: 99 F | HEIGHT: 67 IN | DIASTOLIC BLOOD PRESSURE: 84 MMHG

## 2018-07-12 DIAGNOSIS — I63.9 CEREBROVASCULAR ACCIDENT (CVA), UNSPECIFIED MECHANISM (HCC): ICD-10-CM

## 2018-07-12 PROCEDURE — 99213 OFFICE O/P EST LOW 20 MIN: CPT | Performed by: INTERNAL MEDICINE

## 2018-07-12 NOTE — ASSESSMENT & PLAN NOTE
Regina is a 68 yo female patient of Dr. Isaac who was admitted to [facility] from 6/24/2018 - 6/28/2018 with word finding difficulty with neuroimaging revealing a filling defect in the proximal right M1 segment. She received TPA and was evaluated by neurology and cardiology. Suspected to have pAF and started on eliquis and metoprolol. They have felt that the eliquis is cost prohibitive and Dr. Isaac had sent Xarelto to their pharmacy to see if it was cheaper. Regina and her  haven't checked with their pharmacy yet to see what the co-pay for Xarelto would be. Coumadin is the cheapest per telephone encounter from Kacie Acevedo MA when she spoke with the patient's pharmacy. Since hospital discharge Regina says she has felt really well. She is an avid hiker and has returned to her usual activity. Brings a log of her pedometer from her recent hikes.

## 2018-07-12 NOTE — PROGRESS NOTES
PRIMARY CARE CLINIC FOLLOW UP VISIT  Chief Complaint   Patient presents with   • Hospital Follow-up     Stroke 6/28/18       History of Present Illness     Regina is a pleasant 68 yo female patient of Dr. Isaac presenting with her  for the following:     Stroke (HCC)  Regina is a 68 yo female patient of Dr. Isaac who was admitted to [facility] from 6/24/2018 - 6/28/2018 with word finding difficulty with neuroimaging revealing a filling defect in the proximal right M1 segment. She received TPA and was evaluated by neurology and cardiology. Suspected to have pAF and started on eliquis and metoprolol. They have felt that the eliquis is cost prohibitive and Dr. Isaac had sent Xarelto to their pharmacy to see if it was cheaper. Regina and her  haven't checked with their pharmacy yet to see what the co-pay for Xarelto would be. Coumadin is the cheapest per telephone encounter from Kacie Acevedo MA when she spoke with the patient's pharmacy. Since hospital discharge Regina says she has felt really well. She is an avid hiker and has returned to her usual activity. Brings a log of her pedometer from her recent hikes.     Current Outpatient Prescriptions   Medication Sig Dispense Refill   • rivaroxaban (XARELTO) 15 MG Tab tablet Take 1 Tab by mouth with dinner. 90 Tab 3   • metoprolol (LOPRESSOR) 25 MG Tab Take 0.5 Tabs by mouth 2 Times a Day. 60 Tab 1   • apixaban (ELIQUIS) 5mg Tab Take 1 Tab by mouth 2 Times a Day. 60 Tab 1   • B Complex Vitamins (B COMPLEX 1 PO) Take  by mouth.     • magnesium gluconate (MAG-G) 500 MG tablet Take 500 mg by mouth 3 times a day.     • Multiple Vitamins-Minerals (OCUVITE PO) Take 1 Tab by mouth every day.     • POTASSIUM PO Take 1 Tab by mouth every day.     • ferrous sulfate 325 (65 FE) MG tablet Take 1 Tab by mouth every day. 30 Tab 0   • Omega-3 Fatty Acids (FISH OIL) 1000 MG Cap capsule Take 1,000 mg by mouth every day.     • calcium citrate (CALCITRATE) 950  "MG Tab Take 950 mg by mouth every day.     • Zinc 50 MG Tab Take 50 mg by mouth every day.     • Vitamin D, Cholecalciferol, 1000 UNITS Cap Take 1,000 Units by mouth every day.       No current facility-administered medications for this visit.      ROS  As per HPI above. All other systems reviewed and negative.        Objective   Blood pressure 134/84, pulse (!) 57, temperature 37.2 °C (99 °F), resp. rate 14, height 1.702 m (5' 7\"), weight 57.2 kg (126 lb), SpO2 99 %. Body mass index is 19.73 kg/m².    General: alert and oriented, pleasant, cooperative  HEENT: Normocephalic, atraumatic. No thyroid masses. Oropharynx clear without exudate or injection.   Cardiovascular: regular rate and rhythm, normal S1/S2  Pulmonary: lungs clear to auscultation bilaterally  Skin: warm and dry, no lesions or rashes  Neuro: CN II-XII intact. Clear speech and no focal deficits  Psychiatric: appropriate mood and affect. Good insight and appropriate judgment       Assessment and Plan   The following treatment plan was discussed     Stroke  Discharged from hospitalization 6/24/2018-6/28/2018 for embolic stroke. Evaluated by cardiology and neurology as inpatient and noted to have pAF and cardiology recommended lifelong anticoagulation. She says eliquis is cost prohibitive and informed them that Dr. Isaac has sent for xarelto to her pharmacy and to check with pharmacy if this is more affordable. Advised that she keep up with her active lifestyle.     Return if symptoms worsen or fail to improve.    Joey Trevino MD  Internal Medicine  [facility]                   "

## 2018-07-31 ENCOUNTER — APPOINTMENT (OUTPATIENT)
Dept: RADIOLOGY | Facility: MEDICAL CENTER | Age: 68
DRG: 643 | End: 2018-07-31
Attending: EMERGENCY MEDICINE
Payer: MEDICARE

## 2018-07-31 ENCOUNTER — HOSPITAL ENCOUNTER (INPATIENT)
Facility: MEDICAL CENTER | Age: 68
LOS: 9 days | DRG: 643 | End: 2018-08-09
Attending: EMERGENCY MEDICINE | Admitting: HOSPITALIST
Payer: MEDICARE

## 2018-07-31 DIAGNOSIS — G93.40 ENCEPHALOPATHY: ICD-10-CM

## 2018-07-31 DIAGNOSIS — E87.1 HYPONATREMIA: ICD-10-CM

## 2018-07-31 DIAGNOSIS — R41.89 COGNITIVE DEFICITS: ICD-10-CM

## 2018-07-31 LAB
ABO GROUP BLD: NORMAL
ABO GROUP BLD: NORMAL
ALBUMIN SERPL BCP-MCNC: 4.4 G/DL (ref 3.2–4.9)
ALBUMIN/GLOB SERPL: 1.8 G/DL
ALP SERPL-CCNC: 77 U/L (ref 30–99)
ALT SERPL-CCNC: 33 U/L (ref 2–50)
ANION GAP SERPL CALC-SCNC: 11 MMOL/L (ref 0–11.9)
APTT PPP: 35.6 SEC (ref 24.7–36)
AST SERPL-CCNC: 41 U/L (ref 12–45)
BASOPHILS # BLD AUTO: 0.2 % (ref 0–1.8)
BASOPHILS # BLD: 0.02 K/UL (ref 0–0.12)
BILIRUB SERPL-MCNC: 0.7 MG/DL (ref 0.1–1.5)
BLD GP AB SCN SERPL QL: NORMAL
BUN SERPL-MCNC: 10 MG/DL (ref 8–22)
CALCIUM SERPL-MCNC: 8.6 MG/DL (ref 8.5–10.5)
CHLORIDE SERPL-SCNC: 85 MMOL/L (ref 96–112)
CO2 SERPL-SCNC: 21 MMOL/L (ref 20–33)
CREAT SERPL-MCNC: 0.63 MG/DL (ref 0.5–1.4)
EKG IMPRESSION: NORMAL
EOSINOPHIL # BLD AUTO: 0.01 K/UL (ref 0–0.51)
EOSINOPHIL NFR BLD: 0.1 % (ref 0–6.9)
ERYTHROCYTE [DISTWIDTH] IN BLOOD BY AUTOMATED COUNT: 41.1 FL (ref 35.9–50)
GLOBULIN SER CALC-MCNC: 2.4 G/DL (ref 1.9–3.5)
GLUCOSE BLD-MCNC: 137 MG/DL (ref 65–99)
GLUCOSE SERPL-MCNC: 147 MG/DL (ref 65–99)
HCT VFR BLD AUTO: 32.6 % (ref 37–47)
HGB BLD-MCNC: 11.5 G/DL (ref 12–16)
IMM GRANULOCYTES # BLD AUTO: 0.04 K/UL (ref 0–0.11)
IMM GRANULOCYTES NFR BLD AUTO: 0.4 % (ref 0–0.9)
INR PPP: 1.56 (ref 0.87–1.13)
LYMPHOCYTES # BLD AUTO: 1.89 K/UL (ref 1–4.8)
LYMPHOCYTES NFR BLD: 21.1 % (ref 22–41)
MCH RBC QN AUTO: 32.3 PG (ref 27–33)
MCHC RBC AUTO-ENTMCNC: 35.3 G/DL (ref 33.6–35)
MCV RBC AUTO: 91.6 FL (ref 81.4–97.8)
MONOCYTES # BLD AUTO: 0.55 K/UL (ref 0–0.85)
MONOCYTES NFR BLD AUTO: 6.1 % (ref 0–13.4)
NEUTROPHILS # BLD AUTO: 6.46 K/UL (ref 2–7.15)
NEUTROPHILS NFR BLD: 72.1 % (ref 44–72)
NRBC # BLD AUTO: 0 K/UL
NRBC BLD-RTO: 0 /100 WBC
PLATELET # BLD AUTO: 241 K/UL (ref 164–446)
PMV BLD AUTO: 10.3 FL (ref 9–12.9)
POTASSIUM SERPL-SCNC: 3.5 MMOL/L (ref 3.6–5.5)
PROT SERPL-MCNC: 6.8 G/DL (ref 6–8.2)
PROTHROMBIN TIME: 18.4 SEC (ref 12–14.6)
RBC # BLD AUTO: 3.56 M/UL (ref 4.2–5.4)
RH BLD: NORMAL
RH BLD: NORMAL
SODIUM SERPL-SCNC: 117 MMOL/L (ref 135–145)
TROPONIN I SERPL-MCNC: <0.01 NG/ML (ref 0–0.04)
WBC # BLD AUTO: 9 K/UL (ref 4.8–10.8)

## 2018-07-31 PROCEDURE — 99223 1ST HOSP IP/OBS HIGH 75: CPT | Performed by: HOSPITALIST

## 2018-07-31 PROCEDURE — 85025 COMPLETE CBC W/AUTO DIFF WBC: CPT

## 2018-07-31 PROCEDURE — 94760 N-INVAS EAR/PLS OXIMETRY 1: CPT

## 2018-07-31 PROCEDURE — 0042T CT-CEREBRAL PERFUSION ANALYSIS: CPT

## 2018-07-31 PROCEDURE — 700117 HCHG RX CONTRAST REV CODE 255: Performed by: EMERGENCY MEDICINE

## 2018-07-31 PROCEDURE — 36415 COLL VENOUS BLD VENIPUNCTURE: CPT

## 2018-07-31 PROCEDURE — 70450 CT HEAD/BRAIN W/O DYE: CPT

## 2018-07-31 PROCEDURE — 93005 ELECTROCARDIOGRAM TRACING: CPT | Performed by: EMERGENCY MEDICINE

## 2018-07-31 PROCEDURE — 700105 HCHG RX REV CODE 258: Performed by: HOSPITALIST

## 2018-07-31 PROCEDURE — 86901 BLOOD TYPING SEROLOGIC RH(D): CPT

## 2018-07-31 PROCEDURE — 80053 COMPREHEN METABOLIC PANEL: CPT

## 2018-07-31 PROCEDURE — 99285 EMERGENCY DEPT VISIT HI MDM: CPT

## 2018-07-31 PROCEDURE — 86850 RBC ANTIBODY SCREEN: CPT

## 2018-07-31 PROCEDURE — 84484 ASSAY OF TROPONIN QUANT: CPT

## 2018-07-31 PROCEDURE — 85610 PROTHROMBIN TIME: CPT

## 2018-07-31 PROCEDURE — 85730 THROMBOPLASTIN TIME PARTIAL: CPT

## 2018-07-31 PROCEDURE — 71045 X-RAY EXAM CHEST 1 VIEW: CPT

## 2018-07-31 PROCEDURE — 82962 GLUCOSE BLOOD TEST: CPT

## 2018-07-31 PROCEDURE — 770006 HCHG ROOM/CARE - MED/SURG/GYN SEMI*

## 2018-07-31 PROCEDURE — 70496 CT ANGIOGRAPHY HEAD: CPT

## 2018-07-31 PROCEDURE — G0378 HOSPITAL OBSERVATION PER HR: HCPCS

## 2018-07-31 PROCEDURE — 86900 BLOOD TYPING SEROLOGIC ABO: CPT

## 2018-07-31 RX ORDER — ASPIRIN 300 MG/1
300 SUPPOSITORY RECTAL DAILY
Status: DISCONTINUED | OUTPATIENT
Start: 2018-08-01 | End: 2018-07-31

## 2018-07-31 RX ORDER — LABETALOL HYDROCHLORIDE 5 MG/ML
10 INJECTION, SOLUTION INTRAVENOUS EVERY 4 HOURS PRN
Status: DISCONTINUED | OUTPATIENT
Start: 2018-07-31 | End: 2018-08-09 | Stop reason: HOSPADM

## 2018-07-31 RX ORDER — SODIUM CHLORIDE 9 MG/ML
INJECTION, SOLUTION INTRAVENOUS CONTINUOUS
Status: DISCONTINUED | OUTPATIENT
Start: 2018-07-31 | End: 2018-08-01

## 2018-07-31 RX ORDER — ASPIRIN 81 MG/1
81 TABLET, CHEWABLE ORAL DAILY
Status: DISCONTINUED | OUTPATIENT
Start: 2018-08-01 | End: 2018-08-03

## 2018-07-31 RX ORDER — ASPIRIN 81 MG/1
324 TABLET, CHEWABLE ORAL DAILY
Status: DISCONTINUED | OUTPATIENT
Start: 2018-08-01 | End: 2018-07-31

## 2018-07-31 RX ORDER — BISACODYL 10 MG
10 SUPPOSITORY, RECTAL RECTAL
Status: DISCONTINUED | OUTPATIENT
Start: 2018-07-31 | End: 2018-08-09 | Stop reason: HOSPADM

## 2018-07-31 RX ORDER — AMOXICILLIN 250 MG
2 CAPSULE ORAL 2 TIMES DAILY
Status: DISCONTINUED | OUTPATIENT
Start: 2018-07-31 | End: 2018-08-09 | Stop reason: HOSPADM

## 2018-07-31 RX ORDER — LORAZEPAM 2 MG/ML
4 INJECTION INTRAMUSCULAR
Status: DISCONTINUED | OUTPATIENT
Start: 2018-07-31 | End: 2018-08-09 | Stop reason: HOSPADM

## 2018-07-31 RX ORDER — ASPIRIN 325 MG
325 TABLET ORAL DAILY
Status: DISCONTINUED | OUTPATIENT
Start: 2018-08-01 | End: 2018-07-31

## 2018-07-31 RX ORDER — HYDRALAZINE HYDROCHLORIDE 20 MG/ML
10 INJECTION INTRAMUSCULAR; INTRAVENOUS
Status: DISCONTINUED | OUTPATIENT
Start: 2018-07-31 | End: 2018-08-09 | Stop reason: HOSPADM

## 2018-07-31 RX ORDER — ONDANSETRON 2 MG/ML
4 INJECTION INTRAMUSCULAR; INTRAVENOUS EVERY 4 HOURS PRN
Status: DISCONTINUED | OUTPATIENT
Start: 2018-07-31 | End: 2018-08-09 | Stop reason: HOSPADM

## 2018-07-31 RX ORDER — MAGNESIUM GLUCONATE 30 MG(550)
1 TABLET ORAL DAILY
Status: ON HOLD | COMMUNITY
End: 2018-08-08

## 2018-07-31 RX ORDER — ONDANSETRON 4 MG/1
4 TABLET, ORALLY DISINTEGRATING ORAL EVERY 4 HOURS PRN
Status: DISCONTINUED | OUTPATIENT
Start: 2018-07-31 | End: 2018-08-09 | Stop reason: HOSPADM

## 2018-07-31 RX ORDER — POLYETHYLENE GLYCOL 3350 17 G/17G
1 POWDER, FOR SOLUTION ORAL
Status: DISCONTINUED | OUTPATIENT
Start: 2018-07-31 | End: 2018-08-09 | Stop reason: HOSPADM

## 2018-07-31 RX ORDER — ATORVASTATIN CALCIUM 80 MG/1
80 TABLET, FILM COATED ORAL EVERY EVENING
Status: DISCONTINUED | OUTPATIENT
Start: 2018-08-01 | End: 2018-08-03

## 2018-07-31 RX ADMIN — SODIUM CHLORIDE: 9 INJECTION, SOLUTION INTRAVENOUS at 23:15

## 2018-07-31 RX ADMIN — IOHEXOL 140 ML: 350 INJECTION, SOLUTION INTRAVENOUS at 22:00

## 2018-07-31 ASSESSMENT — PAIN SCALES - GENERAL: PAINLEVEL_OUTOF10: 0

## 2018-08-01 ENCOUNTER — APPOINTMENT (OUTPATIENT)
Dept: RADIOLOGY | Facility: MEDICAL CENTER | Age: 68
DRG: 643 | End: 2018-08-01
Attending: INTERNAL MEDICINE
Payer: MEDICARE

## 2018-08-01 PROBLEM — G93.40 ENCEPHALOPATHY: Status: ACTIVE | Noted: 2018-08-01

## 2018-08-01 PROBLEM — E87.6 HYPOKALEMIA: Status: ACTIVE | Noted: 2018-08-01

## 2018-08-01 PROBLEM — I48.91 ATRIAL FIBRILLATION (HCC): Status: ACTIVE | Noted: 2018-08-01

## 2018-08-01 PROBLEM — D68.318 ACQUIRED CIRCULATING ANTICOAGULANTS (HCC): Status: ACTIVE | Noted: 2018-08-01

## 2018-08-01 PROBLEM — R33.9 URINARY RETENTION: Status: ACTIVE | Noted: 2018-08-01

## 2018-08-01 PROBLEM — E87.1 HYPONATREMIA: Status: ACTIVE | Noted: 2018-08-01

## 2018-08-01 LAB
ANION GAP SERPL CALC-SCNC: 10 MMOL/L (ref 0–11.9)
ANION GAP SERPL CALC-SCNC: 12 MMOL/L (ref 0–11.9)
ANION GAP SERPL CALC-SCNC: 15 MMOL/L (ref 0–11.9)
APPEARANCE UR: CLEAR
APPEARANCE UR: CLEAR
BACTERIA #/AREA URNS HPF: NEGATIVE /HPF
BACTERIA #/AREA URNS HPF: NEGATIVE /HPF
BILIRUB UR QL STRIP.AUTO: NEGATIVE
BILIRUB UR QL STRIP.AUTO: NEGATIVE
BUN SERPL-MCNC: 6 MG/DL (ref 8–22)
BUN SERPL-MCNC: 7 MG/DL (ref 8–22)
BUN SERPL-MCNC: 7 MG/DL (ref 8–22)
CALCIUM SERPL-MCNC: 7.7 MG/DL (ref 8.5–10.5)
CALCIUM SERPL-MCNC: 7.9 MG/DL (ref 8.5–10.5)
CALCIUM SERPL-MCNC: 7.9 MG/DL (ref 8.5–10.5)
CALCIUM SERPL-MCNC: 8 MG/DL (ref 8.5–10.5)
CALCIUM SERPL-MCNC: 8.3 MG/DL (ref 8.5–10.5)
CHLORIDE SERPL-SCNC: 87 MMOL/L (ref 96–112)
CHLORIDE SERPL-SCNC: 89 MMOL/L (ref 96–112)
CHLORIDE SERPL-SCNC: 91 MMOL/L (ref 96–112)
CHLORIDE SERPL-SCNC: 91 MMOL/L (ref 96–112)
CHLORIDE SERPL-SCNC: 94 MMOL/L (ref 96–112)
CO2 SERPL-SCNC: 17 MMOL/L (ref 20–33)
CO2 SERPL-SCNC: 19 MMOL/L (ref 20–33)
CO2 SERPL-SCNC: 20 MMOL/L (ref 20–33)
CO2 SERPL-SCNC: 21 MMOL/L (ref 20–33)
CO2 SERPL-SCNC: 22 MMOL/L (ref 20–33)
COLOR UR: YELLOW
COLOR UR: YELLOW
CORTIS SERPL-MCNC: 47.7 UG/DL (ref 0–23)
CREAT SERPL-MCNC: 0.52 MG/DL (ref 0.5–1.4)
CREAT SERPL-MCNC: 0.53 MG/DL (ref 0.5–1.4)
CREAT SERPL-MCNC: 0.53 MG/DL (ref 0.5–1.4)
CREAT SERPL-MCNC: 0.57 MG/DL (ref 0.5–1.4)
CREAT SERPL-MCNC: 0.65 MG/DL (ref 0.5–1.4)
CREAT UR-MCNC: 31.6 MG/DL
EPI CELLS #/AREA URNS HPF: NEGATIVE /HPF
EPI CELLS #/AREA URNS HPF: NEGATIVE /HPF
FERRITIN SERPL-MCNC: 134.8 NG/ML (ref 10–291)
FOLATE SERPL-MCNC: 13.4 NG/ML
GLUCOSE SERPL-MCNC: 123 MG/DL (ref 65–99)
GLUCOSE SERPL-MCNC: 132 MG/DL (ref 65–99)
GLUCOSE SERPL-MCNC: 135 MG/DL (ref 65–99)
GLUCOSE SERPL-MCNC: 138 MG/DL (ref 65–99)
GLUCOSE SERPL-MCNC: 153 MG/DL (ref 65–99)
GLUCOSE UR STRIP.AUTO-MCNC: 100 MG/DL
GLUCOSE UR STRIP.AUTO-MCNC: NEGATIVE MG/DL
HGB RETIC QN AUTO: 37.4 PG/CELL (ref 29–35)
HYALINE CASTS #/AREA URNS LPF: ABNORMAL /LPF
HYALINE CASTS #/AREA URNS LPF: ABNORMAL /LPF
IMM RETICS NFR: 11.4 % (ref 9.3–17.4)
IRON SATN MFR SERPL: 66 % (ref 15–55)
IRON SERPL-MCNC: 256 UG/DL (ref 40–170)
KETONES UR STRIP.AUTO-MCNC: ABNORMAL MG/DL
KETONES UR STRIP.AUTO-MCNC: NEGATIVE MG/DL
LEUKOCYTE ESTERASE UR QL STRIP.AUTO: ABNORMAL
LEUKOCYTE ESTERASE UR QL STRIP.AUTO: NEGATIVE
MAGNESIUM SERPL-MCNC: 2 MG/DL (ref 1.5–2.5)
MICRO URNS: ABNORMAL
MICRO URNS: ABNORMAL
NITRITE UR QL STRIP.AUTO: NEGATIVE
NITRITE UR QL STRIP.AUTO: NEGATIVE
OSMOLALITY SERPL: 256 MOSM/KG H2O (ref 278–298)
OSMOLALITY SERPL: 271 MOSM/KG H2O (ref 278–298)
OSMOLALITY UR: 338 MOSM/KG H2O (ref 300–900)
OSMOLALITY UR: 383 MOSM/KG H2O (ref 300–900)
PH UR STRIP.AUTO: 6.5 [PH]
PH UR STRIP.AUTO: 7 [PH]
POTASSIUM SERPL-SCNC: 3.2 MMOL/L (ref 3.6–5.5)
POTASSIUM SERPL-SCNC: 3.4 MMOL/L (ref 3.6–5.5)
POTASSIUM SERPL-SCNC: 3.6 MMOL/L (ref 3.6–5.5)
POTASSIUM SERPL-SCNC: 3.8 MMOL/L (ref 3.6–5.5)
POTASSIUM SERPL-SCNC: 3.8 MMOL/L (ref 3.6–5.5)
PROT UR QL STRIP: NEGATIVE MG/DL
PROT UR QL STRIP: NEGATIVE MG/DL
RBC # URNS HPF: ABNORMAL /HPF
RBC # URNS HPF: ABNORMAL /HPF
RBC UR QL AUTO: ABNORMAL
RBC UR QL AUTO: ABNORMAL
RETICS # AUTO: 0.05 M/UL (ref 0.04–0.06)
RETICS/RBC NFR: 1.4 % (ref 0.8–2.1)
SODIUM SERPL-SCNC: 119 MMOL/L (ref 135–145)
SODIUM SERPL-SCNC: 120 MMOL/L (ref 135–145)
SODIUM SERPL-SCNC: 122 MMOL/L (ref 135–145)
SODIUM SERPL-SCNC: 123 MMOL/L (ref 135–145)
SODIUM SERPL-SCNC: 124 MMOL/L (ref 135–145)
SODIUM UR-SCNC: 64 MMOL/L
SODIUM UR-SCNC: 88 MMOL/L
SP GR UR STRIP.AUTO: 1.01
SP GR UR STRIP.AUTO: 1.03
TIBC SERPL-MCNC: 389 UG/DL (ref 250–450)
TSH SERPL DL<=0.005 MIU/L-ACNC: 0.73 UIU/ML (ref 0.38–5.33)
UROBILINOGEN UR STRIP.AUTO-MCNC: 0.2 MG/DL
UROBILINOGEN UR STRIP.AUTO-MCNC: 0.2 MG/DL
VIT B12 SERPL-MCNC: 996 PG/ML (ref 211–911)
WBC #/AREA URNS HPF: ABNORMAL /HPF
WBC #/AREA URNS HPF: ABNORMAL /HPF

## 2018-08-01 PROCEDURE — 81001 URINALYSIS AUTO W/SCOPE: CPT | Mod: 91

## 2018-08-01 PROCEDURE — 82746 ASSAY OF FOLIC ACID SERUM: CPT

## 2018-08-01 PROCEDURE — G8988 SELF CARE GOAL STATUS: HCPCS | Mod: CK

## 2018-08-01 PROCEDURE — 80048 BASIC METABOLIC PNL TOTAL CA: CPT | Mod: 91

## 2018-08-01 PROCEDURE — 700111 HCHG RX REV CODE 636 W/ 250 OVERRIDE (IP): Performed by: HOSPITALIST

## 2018-08-01 PROCEDURE — 83550 IRON BINDING TEST: CPT

## 2018-08-01 PROCEDURE — 82728 ASSAY OF FERRITIN: CPT

## 2018-08-01 PROCEDURE — 84443 ASSAY THYROID STIM HORMONE: CPT

## 2018-08-01 PROCEDURE — 95951 EEG: CPT | Mod: 52

## 2018-08-01 PROCEDURE — G8987 SELF CARE CURRENT STATUS: HCPCS | Mod: CM

## 2018-08-01 PROCEDURE — 4A00X4Z MEASUREMENT OF CENTRAL NERVOUS ELECTRICAL ACTIVITY, EXTERNAL APPROACH: ICD-10-PCS | Performed by: PSYCHIATRY & NEUROLOGY

## 2018-08-01 PROCEDURE — 99291 CRITICAL CARE FIRST HOUR: CPT | Performed by: INTERNAL MEDICINE

## 2018-08-01 PROCEDURE — 83735 ASSAY OF MAGNESIUM: CPT

## 2018-08-01 PROCEDURE — 99233 SBSQ HOSP IP/OBS HIGH 50: CPT | Performed by: INTERNAL MEDICINE

## 2018-08-01 PROCEDURE — 82533 TOTAL CORTISOL: CPT

## 2018-08-01 PROCEDURE — 770020 HCHG ROOM/CARE - TELE (206)

## 2018-08-01 PROCEDURE — 83540 ASSAY OF IRON: CPT

## 2018-08-01 PROCEDURE — 700117 HCHG RX CONTRAST REV CODE 255: Performed by: INTERNAL MEDICINE

## 2018-08-01 PROCEDURE — 97165 OT EVAL LOW COMPLEX 30 MIN: CPT

## 2018-08-01 PROCEDURE — 82607 VITAMIN B-12: CPT

## 2018-08-01 PROCEDURE — 83935 ASSAY OF URINE OSMOLALITY: CPT | Mod: 91

## 2018-08-01 PROCEDURE — 85046 RETICYTE/HGB CONCENTRATE: CPT

## 2018-08-01 PROCEDURE — 70553 MRI BRAIN STEM W/O & W/DYE: CPT

## 2018-08-01 PROCEDURE — 700111 HCHG RX REV CODE 636 W/ 250 OVERRIDE (IP): Performed by: INTERNAL MEDICINE

## 2018-08-01 PROCEDURE — 84300 ASSAY OF URINE SODIUM: CPT | Mod: 91

## 2018-08-01 PROCEDURE — 700105 HCHG RX REV CODE 258: Performed by: HOSPITALIST

## 2018-08-01 PROCEDURE — 700105 HCHG RX REV CODE 258: Performed by: INTERNAL MEDICINE

## 2018-08-01 PROCEDURE — 36415 COLL VENOUS BLD VENIPUNCTURE: CPT

## 2018-08-01 PROCEDURE — A9585 GADOBUTROL INJECTION: HCPCS | Performed by: INTERNAL MEDICINE

## 2018-08-01 PROCEDURE — 82570 ASSAY OF URINE CREATININE: CPT

## 2018-08-01 PROCEDURE — 83930 ASSAY OF BLOOD OSMOLALITY: CPT | Mod: 91

## 2018-08-01 RX ORDER — GADOBUTROL 604.72 MG/ML
7.5 INJECTION INTRAVENOUS ONCE
Status: COMPLETED | OUTPATIENT
Start: 2018-08-01 | End: 2018-08-01

## 2018-08-01 RX ORDER — MORPHINE SULFATE 4 MG/ML
1-2 INJECTION, SOLUTION INTRAMUSCULAR; INTRAVENOUS
Status: DISCONTINUED | OUTPATIENT
Start: 2018-08-01 | End: 2018-08-09 | Stop reason: HOSPADM

## 2018-08-01 RX ORDER — POTASSIUM CHLORIDE 7.45 MG/ML
10 INJECTION INTRAVENOUS
Status: COMPLETED | OUTPATIENT
Start: 2018-08-01 | End: 2018-08-01

## 2018-08-01 RX ORDER — 3% SODIUM CHLORIDE 3 G/100ML
INJECTION, SOLUTION INTRAVENOUS CONTINUOUS
Status: DISCONTINUED | OUTPATIENT
Start: 2018-08-01 | End: 2018-08-02

## 2018-08-01 RX ORDER — POTASSIUM CHLORIDE 20 MEQ/1
40 TABLET, EXTENDED RELEASE ORAL ONCE
Status: DISCONTINUED | OUTPATIENT
Start: 2018-08-01 | End: 2018-08-01

## 2018-08-01 RX ADMIN — ONDANSETRON 4 MG: 2 INJECTION INTRAMUSCULAR; INTRAVENOUS at 20:22

## 2018-08-01 RX ADMIN — POTASSIUM CHLORIDE 10 MEQ: 7.46 INJECTION, SOLUTION INTRAVENOUS at 02:58

## 2018-08-01 RX ADMIN — ONDANSETRON 4 MG: 2 INJECTION INTRAMUSCULAR; INTRAVENOUS at 15:11

## 2018-08-01 RX ADMIN — SODIUM CHLORIDE: 3 INJECTION, SOLUTION INTRAVENOUS at 10:51

## 2018-08-01 RX ADMIN — SODIUM CHLORIDE: 9 INJECTION, SOLUTION INTRAVENOUS at 06:08

## 2018-08-01 RX ADMIN — MORPHINE SULFATE 1 MG: 4 INJECTION INTRAVENOUS at 18:24

## 2018-08-01 RX ADMIN — POTASSIUM CHLORIDE 10 MEQ: 7.46 INJECTION, SOLUTION INTRAVENOUS at 05:29

## 2018-08-01 RX ADMIN — GADOBUTROL 7.5 ML: 604.72 INJECTION INTRAVENOUS at 13:11

## 2018-08-01 RX ADMIN — POTASSIUM CHLORIDE 10 MEQ: 7.46 INJECTION, SOLUTION INTRAVENOUS at 06:02

## 2018-08-01 RX ADMIN — POTASSIUM CHLORIDE 10 MEQ: 7.46 INJECTION, SOLUTION INTRAVENOUS at 04:08

## 2018-08-01 RX ADMIN — MORPHINE SULFATE 1 MG: 4 INJECTION INTRAVENOUS at 09:37

## 2018-08-01 ASSESSMENT — COGNITIVE AND FUNCTIONAL STATUS - GENERAL
DRESSING REGULAR UPPER BODY CLOTHING: TOTAL
DAILY ACTIVITIY SCORE: 6
HELP NEEDED FOR BATHING: TOTAL
TOILETING: TOTAL
DRESSING REGULAR LOWER BODY CLOTHING: TOTAL
SUGGESTED CMS G CODE MODIFIER DAILY ACTIVITY: CN
EATING MEALS: TOTAL
PERSONAL GROOMING: TOTAL

## 2018-08-01 ASSESSMENT — CHA2DS2 SCORE
DIABETES: NO
CHF OR LEFT VENTRICULAR DYSFUNCTION: NO
SEX: FEMALE
AGE 75 OR GREATER: NO
AGE 65 TO 74: YES
PRIOR STROKE OR TIA OR THROMBOEMBOLISM: YES
VASCULAR DISEASE: NO
HYPERTENSION: NO
CHA2DS2 VASC SCORE: 4

## 2018-08-01 ASSESSMENT — ACTIVITIES OF DAILY LIVING (ADL): TOILETING: INDEPENDENT

## 2018-08-01 ASSESSMENT — PAIN SCALES - GENERAL
PAINLEVEL_OUTOF10: ASSUMED PAIN PRESENT
PAINLEVEL_OUTOF10: 0
PAINLEVEL_OUTOF10: 0

## 2018-08-01 NOTE — CONSULTS
Chief complaint: aphasia    HPI: 67yo woman presents with aphasia. Last known normal 7:30pm. Five weeks ago she had the same symptoms and was treated with tPA for suspected stroke. MRI was normal. Tonight she developed sudden difficulty speaking. She had multiple episodes lasting several minutes but after arriving at the hospital her speech as remained abnormal. These transient episodes are the same as 5 weeks ago. They have also been accompanied by spells of blank stares with unresponsiveness, lip smacking, humming, and grimacing. Tonight is the first time she's had these spells since the admission 5 weeks ago.    She does not have a history of seizures. She does have a history of afib but has never been anticoagulated. She is not in afib on the monitor tonight.     CT, CTA, and CTP have already been obtained and were negative for acute findings. No severe stenosis or occlusion on CTA. No mismatch on CTP.    She is pending an outpatient neurology appointment for suspected dementia, which is characterized by short term memory loss. Her language/speech is normal at baseline.    Encounter Vitals  Standard Vitals  Vitals  Blood Pressure : 142/69  Temperature: 36.7 °C (98 °F)  Pulse: 61  Respiration: 18  Pulse Oximetry: 93 %  Weight: 58.1 kg (128 lb)  Encounter Vitals  Temperature: 36.7 °C (98 °F)  Temp Source: Temporal  Blood Pressure : 142/69  BP Location: Upper Arm, Forearm  Pulse: 61  Respiration: 18  Pulse Oximetry: 93 %  O2 Delivery: None (Room Air)  Weight: 58.1 kg (128 lb)  Weight Source: (!) Stated Weight  Reason weight was either estimated or stated: Other (please comment) (stroke called)  Pulmonary-Specific Vitals     Durable Medical Equipment-Specific Vitals  DME  O2 Delivery: None (Room Air)    Exam: NAD, Opens eyes to voice, does not speak, occasionally mumbles incomprehensibly, occasionally follows simple commands, e.g. Stick out your tongue, close your eyes; face is symmetric, EOMI, tongue midline,  vision grossly intact; able to hold all extremities against gravity without drift; responds to touch in all extremities; no ataxia, gaze, or neglect    A/P: 67yo woman with spells concerning for focal seizures. Based on the history and recent negative MRI, I do not suspect ischemic stroke.  -admit to inpatient with telemetry and neuro checks  -EEG in AM  -MRI brain w/wo contrast  -seizure precautions  -ativan 1-2mg IV PRN GTC seizures > 2 minutes  -inpatient neurology consult    Thank you for the consult. Please page with questions.

## 2018-08-01 NOTE — PROCEDURES
DATE OF SERVICE:  08/01/2018    HISTORY OF PRESENT ILLNESS:  Patient is a 68-year-old female being evaluated   for intermittent bouts of aphasia and weakness.  An EEG is requested to rule   out underlying seizure activity.    CONDITION OF RECORDING:  This is a 21-channel, portable video, digital EEG   tracing of approximately 30-minute duration.  Bipolar and referential montages   are used.  Activation procedures are not done.  The patient is on no   medication.  She is awake, drowsy, and asleep for the tracing.    TRACING DESCRIPTION:  As the tracing begins, sleep stages are seen with some   minimal bilateral slowing, there appears to be some temporal focality to the   slowing, left more than right-sided.  At times, this becomes sharply contoured   and higher amplitude with sharp and slow wave activity seen.  There seems to   be an initiation over the left hemisphere with a phase reversal at the S7-T3   interval, at other times seemingly generalized outset.  As the tracing   continues, the patient is seen to become slightly altered with humming and lip   smacking, the background again shows this persistent sharp and slow wave   activity leading into with rhythmic and high amplitude theta rhythms   bilaterally over the temporal regions, becoming more generalized.  Generalized   suppression is then seen following the event as the patient becomes more   somnolent and begins to awaken.    The intermittent and brief isolated sharp and slow wave activity continues   throughout the tracing, it seems to be more apparent with drowsiness and light   stage sleep.  Again, there seems to be bitemporal onset, with rapid   generalization.    IMPRESSION:  This is an abnormal EEG for a patient of this age consistent with   an underlying paroxysmal disorder with clear epileptiform potential.  On one   occasion, sustained electroencephalographic seizure is seen in association   with clinical phenomenology on video.  Clinical  correlation is suggested.       ____________________________________     MD KRYSTAL GUTIÉRREZ / GWEN    DD:  08/01/2018 16:43:40  DT:  08/01/2018 16:54:32    D#:  [id number]  Job#:  [id number]

## 2018-08-01 NOTE — THERAPY
Attempted to see pt for clinical swallow evaluation, but RN stated that pt will be transferring to tele floor soon and asked that pt be seen after transfer. SLP to attempt CSE after transfer. Thank you.

## 2018-08-01 NOTE — CONSULTS
Consults   Nephrology Inpatient Consultation    Date of Service  8/1/2018    Reason for Consultation  Hyponatremia    History of Presenting Illness  68 y.o. female admitted 7/31/2018 with ALOC and difficulty with speech. The patient was admitted 6/24/18 with difficulty word finding and felt to have a cardioembolic stroke. She underwent TPA administration and had recovery. She was discharged with Eliquis (suspected paroxysmal afib). The patient reportedly was doing well and returned to her baseline level of function. She did see her outpatient provider on 7/12 and at that time, noted was not taking xarelto as it was cost prohibitive.     The patient has a history of sudden hyponatremia developing, such as on 6/24 where she had a Na of 124 and K of 3.4 with a Cl of 90. No renal failure. We have UOsm which quickly went from 203 to 69, and the Na level recovered nicely. This time Na was down to 117, serum Osm low, and Urine Osm noted 8/1 at 383. Rafi 64. However, she is on 3%. She is improving.    History from home is drinking about 8 water bottles daily. No severe nausea or vomiting. No diuretics.    Referring Physician  Shan Rivera M.D.    Consulting Physician  Ludwig Malave M.D.    Review of Systems  Review of Systems   Unable to perform ROS: Mental acuity      Past Medical History  Past Medical History:   Diagnosis Date   • A-fib (HCC)    • Hypokalemia    • Hypotension    • Syncope        Surgical History  Past Surgical History:   Procedure Laterality Date   • COLONOSCOPY     • ENDOSCOPY     • TONSILLECTOMY     • TUBAL COAGULATION LAPAROSCOPIC BILATERAL         Medications  No current facility-administered medications on file prior to encounter.      Current Outpatient Prescriptions on File Prior to Encounter   Medication Sig Dispense Refill   • rivaroxaban (XARELTO) 15 MG Tab tablet Take 1 Tab by mouth with dinner. 90 Tab 3   • metoprolol (LOPRESSOR) 25 MG Tab Take 0.5 Tabs by mouth 2 Times a Day. 60 Tab 1    • B Complex Vitamins (B COMPLEX 1 PO) Take  by mouth.     • magnesium gluconate (MAG-G) 500 MG tablet Take 500 mg by mouth 3 times a day.     • Multiple Vitamins-Minerals (OCUVITE PO) Take 1 Tab by mouth every day.     • Omega-3 Fatty Acids (FISH OIL) 1000 MG Cap capsule Take 1,000 mg by mouth every day.     • calcium citrate (CALCITRATE) 950 MG Tab Take 950 mg by mouth every day.     • Zinc 50 MG Tab Take 50 mg by mouth every day.     • Vitamin D, Cholecalciferol, 1000 UNITS Cap Take 1,000 Units by mouth every day.         Family History  family history includes Cancer in her sister; Dementia in her mother; Lung Disease in her paternal grandfather; Other in her brother.    Social History  Social History   Substance Use Topics   • Smoking status: Never Smoker   • Smokeless tobacco: Never Used   • Alcohol use No       Allergies  Allergies   Allergen Reactions   • Other Food      Corn   • Penicillins Rash     Minor RASH        Physical Exam  Laboratory/Imaging   Hemodynamics  Temp (24hrs), Av.9 °C (98.4 °F), Min:36.7 °C (98 °F), Max:37.1 °C (98.8 °F)   Temperature: 36.8 °C (98.3 °F)  Pulse  Av.9  Min: 61  Max: 77 Heart Rate (Monitored): 64  Blood Pressure : 135/74, NIBP: 129/55      Respiratory      Respiration: 20, Pulse Oximetry: 98 %             Fluids  Date 18 0700 - 18 0659   Shift 6866-2834 3031-1885 4068-4109 24 Hour Total   I  N  T  A  K  E   Shift Total       O  U  T  P  U  T   Urine 2500   2500    Shift Total 2500   2500   Weight (kg) 58.1 58.1 58.1 58.1         Nutrition  Orders Placed This Encounter   Procedures   • Diet NPO     Standing Status:   Standing     Number of Occurrences:   1     Order Specific Question:   Restrict to:     Answer:   Strict [1]       Physical Exam   Constitutional: She appears well-developed.   Lethargic and sleeping   Cardiovascular: Normal rate and regular rhythm.    Pulmonary/Chest: Effort normal and breath sounds normal. No respiratory distress. She has  no wheezes.   Abdominal: Soft. Bowel sounds are normal. She exhibits no distension.   Musculoskeletal: She exhibits edema. She exhibits no tenderness.   Neurological:   Wakes briefly, but returns back to sleep quickly   Skin: Skin is warm and dry. She is not diaphoretic.       Recent Labs      07/31/18 2121   WBC  9.0   RBC  3.56*   HEMOGLOBIN  11.5*   HEMATOCRIT  32.6*   MCV  91.6   MCH  32.3   MCHC  35.3*   RDW  41.1   PLATELETCT  241   MPV  10.3     Recent Labs      08/01/18   0509  08/01/18   0952  08/01/18   1356   SODIUM  119*  120*  123*   POTASSIUM  3.8  3.6  3.8   CHLORIDE  87*  89*  91*   CO2  22  19*  17*   GLUCOSE  123*  138*  153*   BUN  7*  7*  6*   CREATININE  0.52  0.53  0.65   CALCIUM  8.3*  8.0*  7.7*     Recent Labs      07/31/18 2121   APTT  35.6   INR  1.56*                  Assessment/Plan     1. Hyponatremia - Patient who has developed hyponatremia rapidly twice now. First episode with quickly improved Uosm, and corrected. This time unclear due to the hypertonic saline but more c/w inappropriate ADH release. Agree with 3%, will reorder lytes to compare and see any change. She is not drinking any water currently so no need for further water restriction. Medications reviewed, and no changes at this time.  2. Encephalopathy - neurology following    -Await repeat tests  -Agree with 3%  -Course will determine rest of the workup

## 2018-08-01 NOTE — CARE PLAN
Problem: Fluid Volume:  Goal: Will maintain balanced intake and output  Outcome: PROGRESSING AS EXPECTED  IV fluids and IV potassium replacement initiated for optimal patient fluid and electrolyte status.  Pt tolerating well.

## 2018-08-01 NOTE — ASSESSMENT & PLAN NOTE
-Continues to retain.    Status post Rosario catheter removal, continue bladder scan post protocol

## 2018-08-01 NOTE — CARE PLAN
Problem: Communication  Goal: The ability to communicate needs accurately and effectively will improve  Outcome: PROGRESSING SLOWER THAN EXPECTED  Patient unable to communicate to simple commands, continue to work with patient, physician and care team to initiate a plan for care, speech therapy, neurology, etc.

## 2018-08-01 NOTE — THERAPY
"Occupational Therapy Evaluation completed.   Functional Status:    Pt moving all extremities, though not purposefully. PROM intact, some resistance noted.   Total A for pericare in bed    Plan of Care: Will benefit from Occupational Therapy 3 times per week  Discharge Recommendations:  Equipment: Will Continue to Assess for Equipment Needs. Post-acute therapy Discharge to a transitional care facility for continued skilled therapy services.    See \"Rehab Therapy-Acute\" Patient Summary Report for complete documentation.    67 y/o female admitted for altered level of consciousness. Pt is not verbal at this time, whispered \"hi\" initially, but did not speak again after that. Family at bedside to provide info, pt allegedly fully independent and very active prior to this event. Pt opens her eyes when directed to ~75% of the time, not moving BUE on command, but moves them spontaneously to itch face, rub in chapstick placed on lips. Responding to painful and uncomfortable stimuli ie pericare. Pt to undergo further testing today. Will continue working with pt to assist with functionality and mobility - may decrease or increase frequency as appropriate should pt become more responsive and cooperative. Will most certainly require post-acute placement.   "

## 2018-08-01 NOTE — ED PROVIDER NOTES
ED Provider Note    CHIEF COMPLAINT  Chief Complaint   Patient presents with   • Possible Stroke       HPI  Azul Milan is a 68 y.o. female who presents with difficulty with speech. Family starts to start approximate 7:30 PM. They found the patient and she was stating words inappropriately and seems confused. On arrival in the emergency department admit the patient for a possible stroke and her symptoms had pretty much resolved. She can speak her name and she can follow commands and was neurologically intact. Family states that she had a similar spell of dysarthria and aphasia about 5 weeks ago and presented to [facility] and was diagnosed with a stroke and the patient received TPA with complete resolution of her symptoms. She has been doing well since that time. Family states she has been a little bit more lethargic lately. They state that she also has suspected dementia. The patient was taken for CT imaging and when she arrived back in her bed her exam was much more concerning as the patient started having difficulty with speech and would not answer questions nor she follow commands appropriately.    REVIEW OF SYSTEMS  See HPI for further details. All other systems are negative.     PAST MEDICAL HISTORY  Past Medical History:   Diagnosis Date   • A-fib (HCC)    • Hypokalemia    • Hypotension    • Syncope        SOCIAL HISTORY  Social History     Social History   • Marital status:      Spouse name: N/A   • Number of children: N/A   • Years of education: N/A     Social History Main Topics   • Smoking status: Never Smoker   • Smokeless tobacco: Never Used   • Alcohol use No   • Drug use: No   • Sexual activity: Yes     Partners: Male     Other Topics Concern   • Not on file     Social History Narrative   • No narrative on file           PHYSICAL EXAM  VITAL SIGNS: /69   Pulse 61   Temp 36.7 °C (98 °F)   Resp 18   Wt 58.1 kg (128 lb)   SpO2 93%   BMI 20.05 kg/m²    Constitutional: Altered on my second exam when the patient returned from CT imaging  HENT: Normocephalic, Atraumatic, tympanic membranes are intact and nonerythematous bilaterally, Oropharynx moist without exudates or erythema, Nose normal.   Eyes: PERRLA, EOMI, Conjunctiva normal.  Neck: Supple without meningismus  Lymphatic: No lymphadenopathy noted.   Cardiovascular: Normal heart rate, Normal rhythm, No murmurs, No rubs, No gallops.   Thorax & Lungs: Normal breath sounds, No respiratory distress, No wheezing, No chest tenderness.   Abdomen: Bowel sounds normal, Soft, No tenderness, no rebound, no guarding, no distention, No masses, No pulsatile masses.   Skin: Warm, Dry, No erythema, No rash.   Back: No tenderness, No CVA tenderness.   Extremities: Atraumatic with symmetric distal pulses, No edema, No tenderness, No cyanosis, No clubbing.   Neurologic: On arrival the patient is alert and oriented and followed commands and was neurologically intact  Psychiatric: Affect normal, Judgment normal, Mood normal.     EKG  12-lead EKG interpreted by myself shows a normal sinus rhythm with a ventricular 66, first-degree AV block, normal QRS, no ST segment elevation or depression, normal T waves.    RADIOLOGY/PROCEDURES  CT-CTA HEAD WITH & W/O-POST PROCESS   Final Result         1. Unchanged minimal indentation/focal narrowing of the proximal right M1 segment. No occlusion or thrombosis is seen.      CT-CEREBRAL PERFUSION ANALYSIS   Final Result      1.  CT perfusion examination over the limited section of brain reveals 0 mL of brain parenchyma has less than 30% of cerebral blood flow (CBF).      2.  Please note that the cerebral perfusion was performed on the limited brain tissue around the basal ganglia region. Infarct/ischemia outside the CT perfusion sections can be missed in this study.      DX-CHEST-PORTABLE (1 VIEW)   Final Result         1. No acute cardiopulmonary abnormalities are identified.      CT-HEAD W/O    Final Result         1. No acute intracranial abnormality. No evidence of acute intracranial hemorrhage or mass lesion.                     COURSE & MEDICAL DECISION MAKING  Pertinent Labs & Imaging studies reviewed. (See chart for details)  This is a 68-year-old female who presents to the emergency department as a possible stroke. On my initial exam the patient is neurologically intact. I did send the patient for a CT scan without contrast that was negative. However on the patient returned to her room she became much more altered. She would not answer questions. She will not speak. The patient would not follow commands but does not seem to have any focal weaknesses I can hold her arm up and she will keep the arm up but she would not follow commands. The same with her legs. I contacted the neurologist on call from a telemedicine standpoint and we went over her imaging studies from her last visit. The patient had an MRI subsequently after the TPA that was normal and therefore a stroke as a source of her presenting symptoms 5 weeks ago would be very unlikely with a normal MRI. Furthermore I reviewed the CT angiogram with the radiologist and he feels that the vessel was kinked but there is not truly a thrombosis or occlusive lesion. Therefore it is unclear if the patient truly had a stroke 5 weeks ago. In speaking with neurologist after he examined the patient were concerned the patient may be having some focal seizures. He does not want to start medication as he does not want to cause any further confusion. He would like to have the patient readmitted for repeat MRI with and without contrast as well as an EEG. The patient on repeat exam continues to have some confusion. Her exam is unchanged. She will follow commands at times again and move all 4 extremities. She does not have any focal deficits therefore stroke or be very unlikely. The patient's sodium is significantly decreased and this could also be causing some  of her confusion as well as some irritability making her more susceptible to focal seizures. The sodium will be corrected and the patient will receive a liter of fluid intravenously. From an infectious standpoint do not see any evidence of an infection nor does she have a leukocytosis nor fever to support an infection. The patient be admitted to the medicine team who is accepted the patient and she is currently in guarded condition.    FINAL IMPRESSION  1. Altered mental status  2. Questionable focal seizure  3. Hyponatremia  4. Critical care time 30 minutes     Disposition  The patient will be admitted in guarded condition    Electronically signed by: Robbie Martinez, 7/31/2018 11:08 PM

## 2018-08-01 NOTE — THERAPY
-Later PM-pt not transferring to tele per nurs. Okay for swallow eval when approp. MD into see pt and requesting to hold swallow eval until 1-2 days as pt's Na improves. Zohaib

## 2018-08-01 NOTE — ED NOTES
Pt aphasic, not following commands. ERP spoke to neuro, stated for pt to have CTA. Pt to CT at this time with trauma RN.

## 2018-08-01 NOTE — PROGRESS NOTES
Report taken  from ED.  Patient arrived to unit via gurney, responds to verbal voice command to open eyes only.  Restless and pulling at wires, responds to painful stimuli, oxygen not maintaining well increased to 5 iters, barely got 93 percent saturation, IVF infusing.  Could not complete admission due to patient not responding, occasionally answer in one simple statement - yes or hi, etc.   Sat up rapidly and threw up undigested food. Returned back to sleep.  Dr. Doand for iv orders vs oral.

## 2018-08-01 NOTE — ED NOTES
Pt brought to RD 11 from CT scan. Placed on the monitor, all alarms audible. Family at bedside. Call light within reach.

## 2018-08-01 NOTE — ED NOTES
Med rec updated and complete.  Allergies reviewed.  Pt is unable at this time to participate in an interview.  Family (  and daughter) are currently at bedside.  Dicussed medications with  as well as last doses taken.

## 2018-08-01 NOTE — ED NOTES
Per report from trauma RN, pt was AOx4, following commands. In CT pt became aphasic, not following commands. Upon assuming care, pt's daughter states pt condition is worse than when pt arrived to ER.

## 2018-08-01 NOTE — ED NOTES
Pts family left. Attempted to call report x 1 no answer. Daughter [phone], [place]  [phone]

## 2018-08-01 NOTE — PROGRESS NOTES
[facility] Hospitalist Progress Note    Date of Service: 2018    Chief Complaint  68 y.o. female with A. fib, recent diagnosis of stroke receiving TPA 5 weeks ago with complete resolution of symptoms admitted 2018 with altered level of consciousness with confusion, difficulty with speech, as well as following commands.  Initial labs showed sodium of 117 potassium 3.5.  CT of the head was unremarkable.  CTA of the head showed no occlusion or thrombosis.  Chest x-ray was NAD.  Neurology was consulted, felt that she did not have any CVA, but recommended brain MRI, and EEG.  Started on IV fluids.    Interval Problem Update  2018 - uneventful night. VSS. Afebrile. Saturating well on 5L O2 NC.  Sodium 119.  Potassium is normalized.  Vitamin B12, ferritin, and folate normal.  Iron level normal.    > Seen and examined.  Confused, awake with eyes open, but does not converse, and nonverbal.  Holding lower abdomen frequently, and grimaces to pain.      Consultants/Specialty  Neurology  Nephrology    Disposition  Monitor on neurology. PT/OT eval.        ROS     Unable to obtain due to lethargy/encephalopathy     Physical Exam  Laboratory/Imaging   Hemodynamics  Temp (24hrs), Av.9 °C (98.5 °F), Min:36.7 °C (98 °F), Max:37.1 °C (98.8 °F)   Temperature: 37.1 °C (98.8 °F)  Pulse  Av  Min: 61  Max: 77 Heart Rate (Monitored): 64  Blood Pressure : 150/72, NIBP: 129/55      Respiratory      Respiration: 18, Pulse Oximetry: 94 %             Fluids  No intake or output data in the 24 hours ending 18 0832    Nutrition  Orders Placed This Encounter   Procedures   • Diet NPO     Standing Status:   Standing     Number of Occurrences:   1     Order Specific Question:   Restrict to:     Answer:   Strict [1]     Physical Exam   Constitutional: She appears well-developed. No distress.   Confused, awake, but nonverbal, and incoherent.  Grimaces to pain.   HENT:   Head: Normocephalic and atraumatic.   Mouth/Throat: No  oropharyngeal exudate.   Eyes: Pupils are equal, round, and reactive to light. Conjunctivae are normal. No scleral icterus.   Neck: Normal range of motion. Neck supple.   Cardiovascular: Normal rate and regular rhythm.  Exam reveals no gallop and no friction rub.    No murmur heard.  Pulmonary/Chest: Effort normal and breath sounds normal. No respiratory distress. She has no wheezes. She has no rales. She exhibits no tenderness.   Abdominal: Soft. Bowel sounds are normal. There is no tenderness. There is no rebound and no guarding.   Palpable urinary bladder.  No tenderness.   Musculoskeletal: She exhibits no tenderness.   Lymphadenopathy:     She has no cervical adenopathy.   Neurological: No cranial nerve deficit.   Moves all 4 extremities.  Symmetric movement.   Skin: Skin is warm and dry. No rash noted. No erythema. No pallor.   Psychiatric:   Psychiatric exam unable to obtain due to encephalopathy.   Nursing note and vitals reviewed.      Recent Labs      07/31/18 2121   WBC  9.0   RBC  3.56*   HEMOGLOBIN  11.5*   HEMATOCRIT  32.6*   MCV  91.6   MCH  32.3   MCHC  35.3*   RDW  41.1   PLATELETCT  241   MPV  10.3     Recent Labs      07/31/18 2121 08/01/18   0509   SODIUM  117*  119*   POTASSIUM  3.5*  3.8   CHLORIDE  85*  87*   CO2  21  22   GLUCOSE  147*  123*   BUN  10  7*   CREATININE  0.63  0.52   CALCIUM  8.6  8.3*     Recent Labs      07/31/18 2121   APTT  35.6   INR  1.56*                  Assessment/Plan     * Acute metabolic encephalopathy due to severe hyponatremia- (present on admission)   Assessment & Plan    -No focal neurologic deficits.  Doubt CVA.  Although seizures possible, this is most likely due to severe hyponatremia with initial sodium of 117.  Urinalysis unimpressive.  -Start 3% sodium chloride solution.  Closely monitor blood sodium levels every 4 hours, would want sodium levels to rise no more than 5-6 mmoL in 24 hours.  Nephrology has been consulted for further guidance.  -Further  workup with brain MRI, and EEG.  If anything comes back positive, we will get neurology on board.  -PT/OT evaluation.        Severe symptomatic hyponatremia- (present on admission)   Assessment & Plan    -With encephalopathy/confusion due to severe hyponatremia.   -Given symptomatic, I will start her on 3% sodium chloride.  Needs very close monitoring of sodium levels to avoid rapid rise, and risk of developing CPM.  Check BMP every 4 hours.  I have consulted nephrology for further guidance.  -Check urine sodium, urine osmolality, plasma osmolality, TSH, and cortisol levels.  -Replace potassium to improve distal tubular reabsorption of sodium.         Hypokalemia- (present on admission)   Assessment & Plan    -Potassium normal.  Continue to monitor, and replace if low to increase distal tubular reabsorption with sodium.  Check magnesium level.        Urinary retention- (present on admission)   Assessment & Plan    -Check bladder scan.  Insert Rosario catheter if greater than 500 cc residual.        Acquired circulating anticoagulants (HCC)- (present on admission)   Assessment & Plan    -Continue home dose Xarelto for PAF.        Paroxysmal atrial fibrillation (HCC)- (present on admission)   Assessment & Plan    -Currently sinus.  Continue Xarelto.        Anemia- (present on admission)   Assessment & Plan    - no signs of acute bleeding.  Iron, ferritin, B12, folate levels normal.  Continue to monitor.  Restrictive transfusion started these, for hemoglobin less than 7.          Quality-Core Measures   Reviewed items::  Labs reviewed, Medications reviewed and Radiology images reviewed  Rosario catheter::  No Rosario  DVT: xarelto.  Assessed for rehabilitation services:  Patient was assess for and/or received rehabilitation services during this hospitalization          Time spent: The patient is critically ill,, with high chance of deterioration into worsening encephalopathy, cardiac arrest, and eventually death if left  untreated. The care that has been undertaken is medically complex. I spent 33 minutes CRITICAL CARE TIME, including managing medical  issues, coordination of care, not including doing procedures, with no overlap in critical care time.

## 2018-08-01 NOTE — ED NOTES
Pt non-verbal at this time, able to follow commands. ERP notified and at bedside. UC notified to call [facility].

## 2018-08-01 NOTE — H&P
Hospital Medicine History & Physical Note    Date of Service  7/31/2018    Primary Care Physician  Rachelle Isaac M.D.    Consultants  Neuro     Code Status  Full    Chief Complaint  Confusion    History of Presenting Illness  68 y.o. female who presented 7/31/2018 with altered level of consciousness.  She has been having difficulty with speech as well as following commands started at 7:30 PM.  She is been having slurred words and inappropriately confused.  She has had no known focal deficits.  Her symptoms seem to be waxing and waning she has had a similar spell of dysarthria and aphasia about 5 weeks ago diagnosed with a stroke patient received TPA and had complete reliant resolution of symptoms on her own at that time.  She does have suspected dementia.  History is significantly limited secondary to mental status change.    Review of Systems  Review of Systems   Unable to perform ROS: Mental status change       Past Medical History   has a past medical history of A-fib (HCC); Hypokalemia; Hypotension; and Syncope.    Surgical History   has a past surgical history that includes tonsillectomy; tubal coagulation laparoscopic bilateral; colonoscopy; and endoscopy.     Family History  family history includes Cancer in her sister; Dementia in her mother; Lung Disease in her paternal grandfather; Other in her brother.     Social History   reports that she has never smoked. She has never used smokeless tobacco. She reports that she does not drink alcohol or use drugs.    Allergies  Allergies   Allergen Reactions   • Other Food      Corn   • Penicillins Rash     Minor RASH       Medications  Prior to Admission Medications   Prescriptions Last Dose Informant Patient Reported? Taking?   B Complex Vitamins (B COMPLEX 1 PO) 6/24/2018 at Unknown time  Yes No   Sig: Take  by mouth.   Multiple Vitamins-Minerals (OCUVITE PO) 6/24/2018 at Unknown time  Yes No   Sig: Take 1 Tab by mouth every day.   Omega-3 Fatty Acids (FISH OIL)  1000 MG Cap capsule 6/24/2018 at Unknown time Patient Yes No   Sig: Take 1,000 mg by mouth every day.   Vitamin D, Cholecalciferol, 1000 UNITS Cap 6/24/2018 at Unknown time Patient Yes No   Sig: Take 1,000 Units by mouth every day.   Zinc 50 MG Tab 6/24/2018 at Unknown time Patient Yes No   Sig: Take 50 mg by mouth every day.   calcium citrate (CALCITRATE) 950 MG Tab 6/24/2018 at Unknown time Patient Yes No   Sig: Take 950 mg by mouth every day.   magnesium gluconate (MAG-G) 500 MG tablet 6/24/2018 at Unknown time  Yes No   Sig: Take 500 mg by mouth 3 times a day.   metoprolol (LOPRESSOR) 25 MG Tab 7/12/2018  No No   Sig: Take 0.5 Tabs by mouth 2 Times a Day.   rivaroxaban (XARELTO) 15 MG Tab tablet 7/12/2018  No No   Sig: Take 1 Tab by mouth with dinner.      Facility-Administered Medications: None       Physical Exam  Blood Pressure : 142/69   Temperature: 36.7 °C (98 °F)   Pulse: 68   Respiration: 18   Pulse Oximetry: 97 %     Physical Exam   Constitutional: She appears well-developed and well-nourished. No distress.   HENT:   Head: Normocephalic and atraumatic.   Mouth/Throat: Oropharynx is clear and moist.   Eyes: Pupils are equal, round, and reactive to light. Conjunctivae and EOM are normal.   Neck: Neck supple. No JVD present.   Cardiovascular: Normal rate, regular rhythm and intact distal pulses.    No murmur heard.  Pulmonary/Chest: Effort normal and breath sounds normal. No respiratory distress. She has no wheezes.   Abdominal: Soft. Bowel sounds are normal. She exhibits no distension. There is no tenderness.   Musculoskeletal: Normal range of motion. She exhibits no edema.   Neurological: She is alert.   inapropriate words, does not follow comands. No obvious focal deficits    Skin: Skin is warm and dry.   Psychiatric:   encephalopathic   Nursing note and vitals reviewed.      Laboratory:  Recent Labs      07/31/18 2121   WBC  9.0   RBC  3.56*   HEMOGLOBIN  11.5*   HEMATOCRIT  32.6*   MCV  91.6   MCH   32.3   MCHC  35.3*   RDW  41.1   PLATELETCT  241   MPV  10.3     Recent Labs      07/31/18 2121   SODIUM  117*   POTASSIUM  3.5*   CHLORIDE  85*   CO2  21   GLUCOSE  147*   BUN  10   CREATININE  0.63   CALCIUM  8.6     Recent Labs      07/31/18 2121   ALTSGPT  33   ASTSGOT  41   ALKPHOSPHAT  77   TBILIRUBIN  0.7   GLUCOSE  147*     Recent Labs      07/31/18 2121   APTT  35.6   INR  1.56*             Lab Results   Component Value Date    TROPONINI <0.01 07/31/2018       Urinalysis:    No results found     Imaging:  CT-CTA HEAD WITH & W/O-POST PROCESS   Final Result         1. Unchanged minimal indentation/focal narrowing of the proximal right M1 segment. No occlusion or thrombosis is seen.      CT-CEREBRAL PERFUSION ANALYSIS   Final Result      1.  CT perfusion examination over the limited section of brain reveals 0 mL of brain parenchyma has less than 30% of cerebral blood flow (CBF).      2.  Please note that the cerebral perfusion was performed on the limited brain tissue around the basal ganglia region. Infarct/ischemia outside the CT perfusion sections can be missed in this study.      DX-CHEST-PORTABLE (1 VIEW)   Final Result         1. No acute cardiopulmonary abnormalities are identified.      CT-HEAD W/O   Final Result         1. No acute intracranial abnormality. No evidence of acute intracranial hemorrhage or mass lesion.                     Assessment/Plan:  I anticipate this patient will require at least two midnights for appropriate medical management, necessitating inpatient admission.    * Encephalopathy   Assessment & Plan    This patient presents with encephalopathy evaluated by tele-neurologist; recommended MRI with and without contrast as well as EEG as he is concerned for possible seizures  We will also rule out stroke  Allow for permissive hypertension   check PT OT speech evaluation  A1c lipid panel  Negative head CT as well as CT angiogram; checking MRI  Patient is also severely  hyponatremic which is likely contributing to symptoms we will attempt correction of sodium  Will also check UA   CNS infection unlikely no neck stiffness, no fever, no wbc count          Hypokalemia   Assessment & Plan    Replace and recheck        Atrial fibrillation (HCC)   Assessment & Plan    On xarelto will continue        Urinary retention   Assessment & Plan    Bladder scan and check urinalysis        Hyponatremia   Assessment & Plan    Check urine and serum osmo   IVF and trend BMP avoid over correction         Anemia- (present on admission)   Assessment & Plan    Mild below baseline no signs of acute bleeding   Will order lab workup             VTE prophylaxis: SCD

## 2018-08-01 NOTE — ASSESSMENT & PLAN NOTE
-Much improved today.    -Continue oral Keppra 500 mg twice a day.    - EEG + abnormal, with frontal cortical abnormality  -Maintaining normal sodium levels.  Discontinue salt tabs, sodium within normal limits  -Continue to monitor.  -Skilled nursing facility evaluation versus home with home health in 1-2 days  Ambulating with standby assist

## 2018-08-01 NOTE — ED NOTES
Ann from Lab called with critical result of  at 2213. Critical lab result read back to Jaja.   Dr. Martinez notified of critical lab result at 2213.  Critical lab result read back by Dr. Martinez.

## 2018-08-01 NOTE — ASSESSMENT & PLAN NOTE
- no signs of acute bleeding.  Iron, ferritin, B12, folate levels normal.  Continue to monitor.    - Restrictive transfusion, transfuse for hemoglobin less than 7.

## 2018-08-01 NOTE — ED NOTES
Chief Complaint   Patient presents with   • Possible Stroke     /69   Pulse 65   Temp 36.7 °C (98 °F)   Resp 18   Wt 58.1 kg (128 lb)   SpO2 94%   BMI 20.05 kg/m²     Pt ambulated into triage, complaints as above. Per family pt having intermittent episodes of aphasia, word salad, head ache and generalized weakness that started at 1930. Hx of previous stroke with similar symptoms per family. Pt AOx4 on arrival, AUGUSTINE,  equal. . PIV line established. Blood drawn and sent to the lab. ERP to charge desk to evaluate pt.

## 2018-08-01 NOTE — DISCHARGE PLANNING
SW responded to stroke pt. SW provided support to pts family. SW escorted family to the pts room where they reported having no further needs. SW encouraged follow up and will remain available.

## 2018-08-02 LAB
ANION GAP SERPL CALC-SCNC: 10 MMOL/L (ref 0–11.9)
ANION GAP SERPL CALC-SCNC: 12 MMOL/L (ref 0–11.9)
ANION GAP SERPL CALC-SCNC: 7 MMOL/L (ref 0–11.9)
ANION GAP SERPL CALC-SCNC: 8 MMOL/L (ref 0–11.9)
ANION GAP SERPL CALC-SCNC: 9 MMOL/L (ref 0–11.9)
ANION GAP SERPL CALC-SCNC: 9 MMOL/L (ref 0–11.9)
BASOPHILS # BLD AUTO: 0.2 % (ref 0–1.8)
BASOPHILS # BLD: 0.02 K/UL (ref 0–0.12)
BUN SERPL-MCNC: 5 MG/DL (ref 8–22)
BUN SERPL-MCNC: 6 MG/DL (ref 8–22)
CALCIUM SERPL-MCNC: 7.9 MG/DL (ref 8.5–10.5)
CALCIUM SERPL-MCNC: 8.1 MG/DL (ref 8.5–10.5)
CALCIUM SERPL-MCNC: 8.1 MG/DL (ref 8.5–10.5)
CALCIUM SERPL-MCNC: 8.2 MG/DL (ref 8.5–10.5)
CHLORIDE SERPL-SCNC: 96 MMOL/L (ref 96–112)
CHLORIDE SERPL-SCNC: 96 MMOL/L (ref 96–112)
CHLORIDE SERPL-SCNC: 97 MMOL/L (ref 96–112)
CHLORIDE SERPL-SCNC: 98 MMOL/L (ref 96–112)
CO2 SERPL-SCNC: 21 MMOL/L (ref 20–33)
CO2 SERPL-SCNC: 22 MMOL/L (ref 20–33)
CO2 SERPL-SCNC: 22 MMOL/L (ref 20–33)
CO2 SERPL-SCNC: 23 MMOL/L (ref 20–33)
CO2 SERPL-SCNC: 23 MMOL/L (ref 20–33)
CO2 SERPL-SCNC: 24 MMOL/L (ref 20–33)
CREAT SERPL-MCNC: 0.43 MG/DL (ref 0.5–1.4)
CREAT SERPL-MCNC: 0.49 MG/DL (ref 0.5–1.4)
CREAT SERPL-MCNC: 0.5 MG/DL (ref 0.5–1.4)
CREAT SERPL-MCNC: 0.53 MG/DL (ref 0.5–1.4)
CREAT SERPL-MCNC: 0.54 MG/DL (ref 0.5–1.4)
CREAT SERPL-MCNC: 0.55 MG/DL (ref 0.5–1.4)
EOSINOPHIL # BLD AUTO: 0 K/UL (ref 0–0.51)
EOSINOPHIL NFR BLD: 0 % (ref 0–6.9)
ERYTHROCYTE [DISTWIDTH] IN BLOOD BY AUTOMATED COUNT: 38.5 FL (ref 35.9–50)
GLUCOSE SERPL-MCNC: 122 MG/DL (ref 65–99)
GLUCOSE SERPL-MCNC: 125 MG/DL (ref 65–99)
GLUCOSE SERPL-MCNC: 131 MG/DL (ref 65–99)
GLUCOSE SERPL-MCNC: 134 MG/DL (ref 65–99)
GLUCOSE SERPL-MCNC: 138 MG/DL (ref 65–99)
GLUCOSE SERPL-MCNC: 144 MG/DL (ref 65–99)
HCT VFR BLD AUTO: 34.4 % (ref 37–47)
HGB BLD-MCNC: 12.3 G/DL (ref 12–16)
IMM GRANULOCYTES # BLD AUTO: 0.05 K/UL (ref 0–0.11)
IMM GRANULOCYTES NFR BLD AUTO: 0.5 % (ref 0–0.9)
LYMPHOCYTES # BLD AUTO: 0.81 K/UL (ref 1–4.8)
LYMPHOCYTES NFR BLD: 7.4 % (ref 22–41)
MCH RBC QN AUTO: 31.4 PG (ref 27–33)
MCHC RBC AUTO-ENTMCNC: 35.8 G/DL (ref 33.6–35)
MCV RBC AUTO: 87.8 FL (ref 81.4–97.8)
MONOCYTES # BLD AUTO: 0.5 K/UL (ref 0–0.85)
MONOCYTES NFR BLD AUTO: 4.5 % (ref 0–13.4)
NEUTROPHILS # BLD AUTO: 9.63 K/UL (ref 2–7.15)
NEUTROPHILS NFR BLD: 87.4 % (ref 44–72)
NRBC # BLD AUTO: 0 K/UL
NRBC BLD-RTO: 0 /100 WBC
OSMOLALITY UR: 512 MOSM/KG H2O (ref 300–900)
PLATELET # BLD AUTO: 246 K/UL (ref 164–446)
PMV BLD AUTO: 10.5 FL (ref 9–12.9)
POTASSIUM SERPL-SCNC: 3.1 MMOL/L (ref 3.6–5.5)
POTASSIUM SERPL-SCNC: 3.1 MMOL/L (ref 3.6–5.5)
POTASSIUM SERPL-SCNC: 3.2 MMOL/L (ref 3.6–5.5)
POTASSIUM SERPL-SCNC: 3.3 MMOL/L (ref 3.6–5.5)
POTASSIUM SERPL-SCNC: 3.4 MMOL/L (ref 3.6–5.5)
POTASSIUM SERPL-SCNC: 3.5 MMOL/L (ref 3.6–5.5)
RBC # BLD AUTO: 3.92 M/UL (ref 4.2–5.4)
SODIUM SERPL-SCNC: 126 MMOL/L (ref 135–145)
SODIUM SERPL-SCNC: 127 MMOL/L (ref 135–145)
SODIUM SERPL-SCNC: 129 MMOL/L (ref 135–145)
SODIUM SERPL-SCNC: 129 MMOL/L (ref 135–145)
SODIUM SERPL-SCNC: 130 MMOL/L (ref 135–145)
SODIUM SERPL-SCNC: 132 MMOL/L (ref 135–145)
WBC # BLD AUTO: 11 K/UL (ref 4.8–10.8)

## 2018-08-02 PROCEDURE — 770020 HCHG ROOM/CARE - TELE (206)

## 2018-08-02 PROCEDURE — 92610 EVALUATE SWALLOWING FUNCTION: CPT

## 2018-08-02 PROCEDURE — 99233 SBSQ HOSP IP/OBS HIGH 50: CPT | Performed by: INTERNAL MEDICINE

## 2018-08-02 PROCEDURE — G8978 MOBILITY CURRENT STATUS: HCPCS | Mod: CI

## 2018-08-02 PROCEDURE — 36415 COLL VENOUS BLD VENIPUNCTURE: CPT

## 2018-08-02 PROCEDURE — G8997 SWALLOW GOAL STATUS: HCPCS | Mod: CH

## 2018-08-02 PROCEDURE — 99232 SBSQ HOSP IP/OBS MODERATE 35: CPT | Performed by: INTERNAL MEDICINE

## 2018-08-02 PROCEDURE — A9270 NON-COVERED ITEM OR SERVICE: HCPCS | Performed by: HOSPITALIST

## 2018-08-02 PROCEDURE — 83935 ASSAY OF URINE OSMOLALITY: CPT

## 2018-08-02 PROCEDURE — 85025 COMPLETE CBC W/AUTO DIFF WBC: CPT

## 2018-08-02 PROCEDURE — G8979 MOBILITY GOAL STATUS: HCPCS | Mod: CI

## 2018-08-02 PROCEDURE — 80048 BASIC METABOLIC PNL TOTAL CA: CPT | Mod: 91

## 2018-08-02 PROCEDURE — 700105 HCHG RX REV CODE 258: Performed by: INTERNAL MEDICINE

## 2018-08-02 PROCEDURE — 700102 HCHG RX REV CODE 250 W/ 637 OVERRIDE(OP): Performed by: HOSPITALIST

## 2018-08-02 PROCEDURE — G8996 SWALLOW CURRENT STATUS: HCPCS | Mod: CK

## 2018-08-02 PROCEDURE — 700111 HCHG RX REV CODE 636 W/ 250 OVERRIDE (IP): Performed by: HOSPITALIST

## 2018-08-02 PROCEDURE — 700111 HCHG RX REV CODE 636 W/ 250 OVERRIDE (IP): Performed by: INTERNAL MEDICINE

## 2018-08-02 PROCEDURE — 97161 PT EVAL LOW COMPLEX 20 MIN: CPT

## 2018-08-02 RX ORDER — POTASSIUM CHLORIDE 7.45 MG/ML
10 INJECTION INTRAVENOUS
Status: DISCONTINUED | OUTPATIENT
Start: 2018-08-02 | End: 2018-08-02

## 2018-08-02 RX ORDER — SODIUM CHLORIDE 9 MG/ML
INJECTION, SOLUTION INTRAVENOUS
Status: ACTIVE
Start: 2018-08-02 | End: 2018-08-03

## 2018-08-02 RX ORDER — POTASSIUM CHLORIDE 7.45 MG/ML
10 INJECTION INTRAVENOUS
Status: COMPLETED | OUTPATIENT
Start: 2018-08-02 | End: 2018-08-02

## 2018-08-02 RX ORDER — SODIUM CHLORIDE 9 MG/ML
INJECTION, SOLUTION INTRAVENOUS CONTINUOUS
Status: DISCONTINUED | OUTPATIENT
Start: 2018-08-02 | End: 2018-08-03

## 2018-08-02 RX ADMIN — POTASSIUM CHLORIDE 10 MEQ: 7.46 INJECTION, SOLUTION INTRAVENOUS at 08:52

## 2018-08-02 RX ADMIN — STANDARDIZED SENNA CONCENTRATE AND DOCUSATE SODIUM 2 TABLET: 8.6; 5 TABLET, FILM COATED ORAL at 17:30

## 2018-08-02 RX ADMIN — POTASSIUM CHLORIDE 10 MEQ: 7.46 INJECTION, SOLUTION INTRAVENOUS at 10:00

## 2018-08-02 RX ADMIN — MORPHINE SULFATE 1 MG: 4 INJECTION INTRAVENOUS at 08:12

## 2018-08-02 RX ADMIN — SODIUM CHLORIDE: 9 INJECTION, SOLUTION INTRAVENOUS at 15:51

## 2018-08-02 RX ADMIN — ONDANSETRON 4 MG: 2 INJECTION INTRAMUSCULAR; INTRAVENOUS at 15:49

## 2018-08-02 RX ADMIN — ATORVASTATIN CALCIUM 80 MG: 80 TABLET, FILM COATED ORAL at 17:30

## 2018-08-02 RX ADMIN — RIVAROXABAN 20 MG: 20 TABLET, FILM COATED ORAL at 17:31

## 2018-08-02 ASSESSMENT — GAIT ASSESSMENTS
DISTANCE (FEET): 150
ASSISTIVE DEVICE: HAND HELD ASSIST
GAIT LEVEL OF ASSIST: CONTACT GUARD ASSIST

## 2018-08-02 ASSESSMENT — COGNITIVE AND FUNCTIONAL STATUS - GENERAL
SUGGESTED CMS G CODE MODIFIER MOBILITY: CJ
MOVING FROM LYING ON BACK TO SITTING ON SIDE OF FLAT BED: A LITTLE
MOBILITY SCORE: 20
STANDING UP FROM CHAIR USING ARMS: A LITTLE
CLIMB 3 TO 5 STEPS WITH RAILING: A LITTLE
WALKING IN HOSPITAL ROOM: A LITTLE

## 2018-08-02 ASSESSMENT — PAIN SCALES - GENERAL
PAINLEVEL_OUTOF10: 6
PAINLEVEL_OUTOF10: 0
PAINLEVEL_OUTOF10: 5
PAINLEVEL_OUTOF10: 0

## 2018-08-02 NOTE — PROGRESS NOTES
Called Dr. Fontaine, hospitalist - Re:  Labs  Sodium 127, trending upward, not to normal - order to hold infusion for now.   Potassium 3.1  Orders placed to replace with 20 meq IVPB.

## 2018-08-02 NOTE — PROGRESS NOTES
Monitor Summary: SR , MO.24, QRS.08, QT.40 with two 2.1 sp with two episodes of bradycardia to the 30s per strip from monitor room.

## 2018-08-02 NOTE — PROGRESS NOTES
[facility] Hospitalist Progress Note    Date of Service: 8/2/2018    Chief Complaint  68 y.o. female with A. fib, recent diagnosis of stroke receiving TPA 5 weeks ago with complete resolution of symptoms admitted 7/31/2018 with altered level of consciousness with confusion, difficulty with speech, as well as following commands.  Initial labs showed sodium of 117 potassium 3.5.  CT of the head was unremarkable.  CTA of the head showed no occlusion or thrombosis.  Chest x-ray was NAD.  Neurology was consulted, felt that she did not have any CVA, but recommended brain MRI, and EEG.  Started on IV fluids.  Sodium remained very low, with continued encephalopathy.  Hypertonic saline was started.  Nephrology was consulted. Vitamin B12, ferritin, and folate normal.  Iron level normal.    Interval Problem Update  8/2/2018 - no overnight events. Remains hemodynamically stable and afebrile. Stable on 1L O2 NC.  WBC 11,000, no bandemia.  Sodium is improved to 127 from 120 yesterday, hypertonic saline was discontinued.  Potassium 3.1. Posm 256, Uosm 338, Rafi 88 consistent with SIADH.  EEG showed underlying paroxysmal disorder with clear epileptiform potential, with sustained electroencephalographic seizure in one location with clinical phenomenology on video.  Brain MRI with and without contrast were within normal limits for age with mild to moderate atrophy and mild to moderate white matter changes.  OT recommended SNF placement.  Potassium has been replaced.  Magnesium normal.    > Seen and examined.  More awake, answers questions appropriately but still slow and distractible.  Knows that she is in the hospital, but thinks she is at [place], and it is 2017 in spring.  Able to recite days of the week forward, but not backwards.  Denied any pain.  No nausea, vomiting, abdominal pain.  Per RN, able to walk around.        Consultants/Specialty  Neurology  Nephrology    Disposition  Monitor on neurology.  SNF placement.        SAMEERA      Pertinent positives/negatives as mentioned above.     A complete review of systems was done, limited by confusion. All other systems were negative.      Physical Exam  Laboratory/Imaging   Hemodynamics  Temp (24hrs), Av.9 °C (98.4 °F), Min:36.7 °C (98 °F), Max:37.2 °C (98.9 °F)   Temperature: 36.8 °C (98.2 °F)  Pulse  Av.6  Min: 61  Max: 94    Blood Pressure : 125/58      Respiratory      Respiration: 20, Pulse Oximetry: 92 %             Fluids    Intake/Output Summary (Last 24 hours) at 18 07  Last data filed at 18 0200   Gross per 24 hour   Intake                0 ml   Output             4000 ml   Net            -4000 ml       Nutrition  Orders Placed This Encounter   Procedures   • Diet NPO     Standing Status:   Standing     Number of Occurrences:   1     Order Specific Question:   Restrict to:     Answer:   Strict [1]     Physical Exam   Constitutional: She appears well-developed and well-nourished. No distress.   HENT:   Head: Normocephalic and atraumatic.   Mouth/Throat: No oropharyngeal exudate.   Eyes: Pupils are equal, round, and reactive to light. Conjunctivae are normal. No scleral icterus.   Neck: Normal range of motion. Neck supple.   Cardiovascular: Normal rate and regular rhythm.  Exam reveals no gallop and no friction rub.    No murmur heard.  Pulmonary/Chest: Effort normal and breath sounds normal. No respiratory distress. She has no wheezes. She has no rales. She exhibits no tenderness.   Abdominal: Soft. Bowel sounds are normal. She exhibits no distension. There is no tenderness. There is no rebound and no guarding.   Musculoskeletal: Normal range of motion. She exhibits no edema or tenderness.   Lymphadenopathy:     She has no cervical adenopathy.   Neurological: She is alert. No cranial nerve deficit.   Awake, answers questions appropriately.   Knows that she is in the hospital, but thinks she is at [place], and it is 2017 in spring.  Able to recite days of the  week forward, but not backwards.    Skin: Skin is warm and dry. No rash noted. She is not diaphoretic. No erythema. No pallor.   Psychiatric:   Unable to fully assess due to confusion   Nursing note and vitals reviewed.      Recent Labs      07/31/18 2121 08/02/18 0155   WBC  9.0  11.0*   RBC  3.56*  3.92*   HEMOGLOBIN  11.5*  12.3   HEMATOCRIT  32.6*  34.4*   MCV  91.6  87.8   MCH  32.3  31.4   MCHC  35.3*  35.8*   RDW  41.1  38.5   PLATELETCT  241  246   MPV  10.3  10.5     Recent Labs      08/01/18   2211 08/02/18   0155  08/02/18   0616   SODIUM  124*  127*  129*   POTASSIUM  3.2*  3.1*  3.1*   CHLORIDE  94*  96  98   CO2  20  22  22   GLUCOSE  135*  125*  122*   BUN  6*  5*  5*   CREATININE  0.53  0.55  0.53   CALCIUM  7.9*  7.9*  7.9*     Recent Labs      07/31/18 2121   APTT  35.6   INR  1.56*                  Assessment/Plan     * Acute metabolic encephalopathy due to severe hyponatremia, nonconvulsive seizure- (present on admission)   Assessment & Plan    -Much improved mentation with improvement in her sodium levels.  However, EEG showed electroencephalographic seizure with epileptiform potential.  -Discontinue hypertonic fluid for now.  Continue to closely monitor blood sodium levels every 4 hours to make sure that she is sustaining her sodium levels.  May need salt tablets if sodium drops again.  Nephrology on board.  -Discussed with neurology, Dr. Mendoza will see her, and may need to be started on antiepileptics.  Seizure precautions.  -Needs SNF placement per PT/OT. CM/SW on board.         Severe symptomatic hyponatremia, likely SIADH- (present on admission)   Assessment & Plan    -With encephalopathy/confusion due to severe hyponatremia.  Much improved mentation with improvement in her sodium levels.  Sodium improved at an appropriate rate.  -Discontinue hypertonic fluid for now.  Continue very close monitoring of sodium levels to ensure that she maintains her levels.  May needs salt tablets  if sodium drops again.  Nephrology following   -Continue to replace potassium to improve distal tubular reabsorption of sodium.         Hypokalemia- (present on admission)   Assessment & Plan    -Replace with K-Lyte 50mEq x1. Continue to monitor, and replace if low to increase distal tubular reabsorption with sodium.  Mg WNL.  Continue BMP monitoring.        Urinary retention- (present on admission)   Assessment & Plan    -Urinating okay.  Continue bladder scans.        Acquired circulating anticoagulants (HCC)- (present on admission)   Assessment & Plan    -Continue home dose Xarelto for PAF.        Paroxysmal atrial fibrillation (HCC)- (present on admission)   Assessment & Plan    -Currently sinus.  Continue Xarelto.        Anemia- (present on admission)   Assessment & Plan    - no signs of acute bleeding.  Iron, ferritin, B12, folate levels normal.  Continue to monitor.  Restrictive transfusion, transfuse for hemoglobin less than 7.          Quality-Core Measures   Reviewed items::  Labs reviewed, Medications reviewed and Radiology images reviewed  Rosario catheter::  No Rosario  DVT: xarelto.  Assessed for rehabilitation services:  Patient was assess for and/or received rehabilitation services during this hospitalization

## 2018-08-02 NOTE — PROGRESS NOTES
Received report, assumed pt care. Pt VSS, assessment completed. Resting comfortably in bed with call light, bedside table in reach. No c/o at this time. Side rails up 2. Family instructed to use call light when needing assistance verbalized understanding. Bed in low position. Will continue to monitor.

## 2018-08-02 NOTE — DISCHARGE PLANNING
Anticipated Discharge Disposition:   TBD but MD recommends SNF    Action:   Discussed with IDT :  MD recommends SNF placement    Addendum:  Met with family. Discussed SNF placement.  confirmed that MD talked to him about SNF placement. The family will look into [facility] tomorrow then sign the choice form if they are agreeable.     Barriers to Discharge:   Medical clearance    Plan:   Get choice for SNF  Follow up with MD.

## 2018-08-02 NOTE — PROGRESS NOTES
Hospital Medicine Progress Note, Adult, Complex               Author: Ludwig Malave Date & Time created: 8/2/2018  2:59 PM     Interval History:  68 year old with hyponatremia, and found to have seizures. Much improved today.  3% being held. Seems to be improving.     Review of Systems:  ROS    Physical Exam:  Physical Exam   Constitutional: She appears well-developed and well-nourished.   Cardiovascular: Normal rate and regular rhythm.    Pulmonary/Chest: Effort normal and breath sounds normal.   Musculoskeletal: She exhibits no edema or tenderness.   Neurological: She is alert.   Skin: Skin is warm and dry.       Labs:        Invalid input(s): [id number]  Recent Labs      07/31/18 2121   TROPONINI  <0.01     Recent Labs      08/01/18   1356   08/02/18   0155 08/02/18   0616 08/02/18   1007   SODIUM  123*   < >  127*  129*  130*   POTASSIUM  3.8   < >  3.1*  3.1*  3.5*   CHLORIDE  91*   < >  96  98  98   CO2  17*   < >  22  22  24   BUN  6*   < >  5*  5*  5*   CREATININE  0.65   < >  0.55  0.53  0.50   MAGNESIUM  2.0   --    --    --    --    CALCIUM  7.7*   < >  7.9*  7.9*  8.1*    < > = values in this interval not displayed.     Recent Labs      07/31/18 2121 08/02/18   0155  08/02/18   0616  08/02/18   1007   ALTSGPT  33   --    --    --    --    ASTSGOT  41   --    --    --    --    ALKPHOSPHAT  77   --    --    --    --    TBILIRUBIN  0.7   --    --    --    --    GLUCOSE  147*   < >  125*  122*  138*    < > = values in this interval not displayed.     Recent Labs      07/31/18 2121 08/01/18   0509  08/02/18   0155   RBC  3.56*   --   3.92*   HEMOGLOBIN  11.5*   --   12.3   HEMATOCRIT  32.6*   --   34.4*   PLATELETCT  241   --   246   PROTHROMBTM  18.4*   --    --    APTT  35.6   --    --    INR  1.56*   --    --    IRON   --   256*   --    FERRITIN   --   134.8   --    TOTIRONBC   --   389   --      Recent Labs      07/31/18 2121 08/02/18   0155   WBC  9.0  11.0*   NEUTSPOLYS  72.10*   87.40*   LYMPHOCYTES  21.10*  7.40*   MONOCYTES  6.10  4.50   EOSINOPHILS  0.10  0.00   BASOPHILS  0.20  0.20   ASTSGOT  41   --    ALTSGPT  33   --    ALKPHOSPHAT  77   --    TBILIRUBIN  0.7   --            Hemodynamics:  Temp (24hrs), Av.7 °C (98.1 °F), Min:36.3 °C (97.4 °F), Max:37.2 °C (98.9 °F)  Temperature: 36.3 °C (97.4 °F)  Pulse  Av.7  Min: 61  Max: 94   Blood Pressure : 133/70     Respiratory:    Respiration: 16, Pulse Oximetry: 91 %           Fluids:    Intake/Output Summary (Last 24 hours) at 18 1459  Last data filed at 18 0908   Gross per 24 hour   Intake                0 ml   Output             2600 ml   Net            -2600 ml        GI/Nutrition:  Orders Placed This Encounter   Procedures   • Diet Order Full Liquid     Standing Status:   Standing     Number of Occurrences:   1     Order Specific Question:   Diet:     Answer:   Full Liquid [11]     Order Specific Question:   Consistency/Fluid modifications:     Answer:   Nectar Thick [2]     Medical Decision Making, by Problem:  Active Hospital Problems    Diagnosis   • *Acute metabolic encephalopathy due to severe hyponatremia, nonconvulsive seizure [G93.40]   • Severe symptomatic hyponatremia, likely SIADH [E87.1]   • Urinary retention [R33.9]   • Hypokalemia [E87.6]   • Paroxysmal atrial fibrillation (HCC) [I48.91]   • Acquired circulating anticoagulants (HCC) [D68.318]   • Anemia [D64.9]     1. Hyponatremia -  Cause unclear. Now that she is off of 3%, will check urine osm. May need PO salt tabs.  2. Seizures - Followed by neurology, now on Keppra  3. Hypokalemia - repleting    -Uosm  -d/c 3% saline  -Will start PO salt tabs if needed  Quality-Core Measures

## 2018-08-02 NOTE — PROGRESS NOTES
Received report, assumed pt care. Pt a&o 2, VSS, assessment completed. Resting comfortably in bed with call light, bedside table in reach. No c/o at this time. Side rails up 2. Instructed to use call light when needing to get OOB verbalized understanding. Bed in low position. Will continue to monitor. Family at bedside and educated to call when pt needs assistance, family confirmed understanding.

## 2018-08-02 NOTE — CARE PLAN
Problem: Knowledge Deficit  Goal: Knowledge of disease process/condition, treatment plan, diagnostic tests, and medications will improve  POC: monitor labs, swallow eval complete - diet ordered    Problem: Pain Management  Goal: Pain level will decrease to patient's comfort goal  PRN pain medication given for pain management, RN will continue to assess pts pain levels throughout the shift.

## 2018-08-02 NOTE — PROGRESS NOTES
Called Dr Fontaine at 1924, re:  Na level 122/   Orders -- no change in Na CL 3 percent infusion rate at this time.

## 2018-08-02 NOTE — CARE PLAN
Problem: Venous Thromboembolism (VTW)/Deep Vein Thrombosis (DVT) Prevention:  Goal: Patient will participate in Venous Thrombosis (VTE)/Deep Vein Thrombosis (DVT)Prevention Measures  Xeralto     Problem: Knowledge Deficit  Goal: Knowledge of disease process/condition, treatment plan, diagnostic tests, and medications will improve  POC: EEG, MRI, monitor BMP

## 2018-08-02 NOTE — PROGRESS NOTES
Skin check performed on new admit by 2 RN's, No evidence of pressure ulcers or any skin abnormality.  Skin intact.

## 2018-08-02 NOTE — THERAPY
"Physical Therapy Evaluation completed.   Bed Mobility:  Supine to Sit: Stand by Assist  Transfers: Sit to Stand: Contact Guard Assist  Gait: Level Of Assist: Contact Guard Assist with HHA       Plan of Care: Will benefit from Physical Therapy 3 times per week  Discharge Recommendations: Equipment: Will Continue to Assess for Equipment Needs.     Pt presents just below her baseline functionally and was able to perform gait without device and with HHA. She did not demonstrate an overt LOB. Anticipate she will continue to self-progress as she mobilizes. Will see for safety, but she appears functionally capable of return home with family once medically cleared.     See \"Rehab Therapy-Acute\" Patient Summary Report for complete documentation.     "

## 2018-08-02 NOTE — CONSULTS
"DATE OF SERVICE:  08/02/2018    NEUROLOGIC CONSULTATION    CHIEF COMPLAINT:  Neurologic consultation was requested on this pleasant lady   by Dr. Rivera of the hospitalist service, admitted for persistent episodes of   fluctuating mentation suggestive of possible seizures.    HISTORY OF PRESENT ILLNESS:  The history is gotten basically from discussions   with the patient's family, Dr. Rivera as well as review of the electronic health   record.  The patient is a little bit too encephalopathic and goofy to provide   anything coherent.    She is a pleasant 68-year-old right-handed female who is evaluated at the   bedside, she states she is doing well, is very sleepy, but also does not   remember much about how she got into the hospital.    Patient was admitted on 31st for an episode of acute onset distortion of   language, beginning at 7:30 p.m. on the day of admission.  Lucid at that time,   she suddenly had problems finding words, described by her daughter as \"word   salad\" and then they saw some automatisms with clicking of the mouth, sucking   motions of the lips and altered sensorium.  She was awake, but not responsive.    There were no other involuntary movements.  This episode lasted for quite   some time and the patient was brought to the emergency room.    Since being here, mentation has fluctuated significantly, though the language   deficits did improve.  There has been no documentation of any of the   automatisms since being here.    Initial evaluations including chem profile showed a sodium at 117, slowly   increasing now up to 130.  Glucose has been slightly elevated, but nothing too   excessive, now 138.  BUN/creatinine values have been normal, calcium bearing   slightly, though albumin is low, calcium 8.1, magnesium is normal at 2,   serum osmolality 256.  Liver profile has been normal throughout.    Hemogram showed a WBC of 9, H/H 11.5/30.6, indices normal, and platelets of   241 K with an unremarkable " differential at the time of admission.  These   values have remained essentially unaltered.  CT scan of the brain, CTA of the   brain and neck were all done, CT revealing no evidence of acute or evolving bland   ischemia, hemorrhage, etc.  CTA perfusion study was normal.  CTA studies   revealed no significant intracranial or extracranial vascular occlusive   disease of the larger vessels.  MRI scan was then done, I reviewed the images   as well, revealing only minimal chronic microvascular ischemic changes,   nothing acute.  This was done while the patient has remained symptomatic.    EEG study was done, I reviewed this, revealing bitemporal, at times   secondarily generalized, seizure activity, sustained epileptiform attack   occurred associated with the clinical symptoms of automatisms and lip   smacking.    According to family, these episodes of lip smacking started years ago, they   thought nothing much of it though the patient did seem to be slightly altered   during these episodes.  Initially with these episodes, the recovery was quick,   there were never language distortions or sustained confusion.  Over time,   they began to increase in both frequency and then in 06/2018, she was admitted   for more sustained event.  This was presumed a TIA, she was treated with IV TPA,   though the symptoms resolved during the infusion itself or around that time.    MRI scan was reviewed, again revealing no evidence of acute change,   essentially unchanged from the scan that was done with this admission.    REVIEW OF SYSTEMS:  At this time, this could not be accurately obtained   because of the patient's confusion and encephalopathy.    MEDICAL HISTORY:  1.  Dementia:  Patient has had a slow and steady cognitive deterioration over   the last 6 years or more.  At this time, she is now more dependent on her    for medications, though he has taken over finances, she does remain   fully independent with her ADLs, but she  repeats herself, has very significant   impairment of short term and intermediate memory, etc.  She is no longer   capable of multitasking like she once was, she is accompanied where she does   go.  Family was concerned that if not, she would get lost.  She is no longer   driving as a result. She is not incontinent, balance is stable.  She sleeps well,   there have been no issues with agitated delirium or hallucinosis, significant   changes in personality with paranoid delusions.  She has been sleeping well,   there is no history of REM-behavioral sleep distortions.  She has not been   treated, or workup not obtained.  2.  SVT/atrial fibrillation:  There is a history of the arrhythmia, cardiology   consultation with her June admission recommended anticoagulation.  3.  Dyslipidemia.    There is no documented history of MS, seizure, migraine, malignancy, thyroid   disease, Parkinson's disease, blood dyscrasia, liver or kidney disease,   pulmonary disease, GEOVANNI, hypertension, diabetes.    SURGICAL HISTORY:  Negative for pacemaker placement or valvuloplasty.    MEDICATIONS:  At home, the patient was on Xarelto, Lopressor, vitamin D and   calcium supplements, fish oil, and other vitamin B supplements, glucosamine   chondroitin sulfate, etc.    Since admission, Xarelto, baby aspirin has been added, Lipitor 80 mg added.    ALLERGIES:  PENICILLIN.    FAMILY HISTORY:  There is a strong family history of dementia in 2 of the   patient's siblings as well as her mother.  There is no history of Parkinson's   disease or cerebrovascular disease, and seizures.    SOCIAL HISTORY:  There is no history of significant tobacco use.  She does not   drink.  There is no history of other recreational drug use.    PHYSICAL EXAMINATION:  CONSTITUTIONAL:  Patient is lying in bed, quite sleepy, but is easily aroused.    She is well developed and well nourished, she is cooperative, but again only   briefly, she tends to drift off to sleep if she  is not stimulated.  VITAL SIGNS:  Show blood pressure 133/74, pulse is 87 and regular, respiratory   rate 17.  She is 94% saturated on room air and afebrile at 97.9 degrees   Fahrenheit.  HEENT:  Negative for malar rash, jaw or temporal tenderness, or jaw   claudication.  The mucosa is quite dry.  NECK:  Supple, without meningismus.  Carotid pulses are present to palpation   without asymmetry.  COR:  Remarkable for a regular rhythm.  VASCULAR:  Negative for evidence of lower extremity edema or vascular   insufficiency changes.  HEMATOLOGIC:  Negative for evidence of bruising.    NEUROLOGIC EXAMINATION:  Again, mental status exam was limited because of the   sleepiness.  Patient has a concept that she is in the hospital, but no clue as   to how long she has been here, the actual date, etc.  She knows her date of   birth, but not her age.  When asked the name of her daughter who is sitting at   the bedside, she has clear difficulty with that.  She recognizes her    when he comes in.  She follows commands, there is no apraxia, but there is   hand-object substitution with motor skills assessment.  She names and repeats   easily, but she is also perseverative.  Mental processing is quite slowed.    Cranial nerve exam reveals PERRLA/EOMI, visual fields are full to movement   detection on confrontation, and there is no bulbar dysfunction, facial   movements are symmetric, sensory exam is intact to temperature and pinprick on   both sides.  Shoulder shrug is symmetric.    Musculoskeletal exam reveals normal tone bilaterally.  There is no tremor or   drift.  Strength is grossly full, though a very precise evaluation cannot be   done because of poor cooperation.  Reflexes are present at all points without   asymmetries, none are dropped, both toes are downgoing.    Coordination is limited, again because of poor cooperation.  Fine motor   control is intact and symmetric in all 4 extremities.  There is no obvious    appendicular dystaxia with the upper extremities.    Sensory exam is intact to pinprick and temperature, vibration appears intact   in the upper extremities, she becomes less consistent in the lower   extremities.    DIAGNOSTICS:  Pertinent diagnostics from this admission as well as those from   her previous admissions are reviewed in the HPI above.    IMPRESSION:  1.  Seizures:  I suspect this is the reason this woman has had a more notable   deterioration over the last month and a half, EEG confirms bitemporal   localization, with rapid generalization.  Antiepileptic treatment does need to   be initiated.  I suspect the event she had back in June was not in fact a   transient ischemic attack, but presentation more consistent with seizures.  2.  Cerebrovascular disease: Because I suspect these events are ictal,   I do not think there is a need for high-dose statin, certainly   no need for adding aspirin to the Xarelto.  The Xarelto does need to be   continued because of her risk moving forward of atrial fibrillation and   elevated stroke risk.  MRI imaging absent acute changes while the patient   remains symptomatic, is consistent with these impressions.  3.  Dementia:  I suspect this woman does have dementia, most likely   Alzheimer's type, but before we start adding symptomatic relief with   cholinesterase inhibitors and NMDA receptor blockers, I would like to get the   seizures under better control.  Blood work can be drawn as routine dementia   workup.  The strong family history is certainly consistent with this   impression, the history itself as presented by family is consistent with a  progressive issue.    Unfortunately, even though the seizures themselves may be worsening cognitive   function, I do not think they are the only cause of this patient's   deterioration over the longer interval, and the family was told that this will   likely improve, but not slow the progression of the dementia down.    Face  to face time was spent at length talking about all of the above with the   patient's family, how of this the patient actually retains is still in   question, it will probably need to be repeated when I followup with her in the   office.    PLAN:  1.  Discontinue aspirin, and Lipitor can be diminished to 40 mg daily.  2.  Continue Xarelto.  3.  Keppra 1 g IV load followed by 500 mg p.o. q. 12 hours.  4.  Seizure precaution.  5.  Blood work as part of her dementia screen.  6.  Continue correction of hyponatremia as per Dr. Malave and the nephrology   service.  7.  Blood work for routine dementia screen.  8.  DVT prophylaxis.  9.  I will follow the patient along with you and then as an outpatient.    Thank you very much for this consultation.    TIME:  70 minutes was spent face to face with the patient's family for exam,   review, discussion, and education, of this over 50% of the time was spent   counseling and coordinating care.       ____________________________________     MD KRYSTAL GUTIÉRREZ / GWEN    DD:  08/02/2018 11:13:26  DT:  08/02/2018 12:28:26    D#:  [id number]  Job#:  [id number]

## 2018-08-02 NOTE — PROGRESS NOTES
Dr. Rivera notified that pt had two beats of Second degree type 2, RN was in the pt room when this happened and pt is asymptomatic. No new orders at this time, RN will update MD with any changes.

## 2018-08-02 NOTE — CARE PLAN
Problem: Fluid Volume:  Goal: Will maintain balanced intake and output  Outcome: PROGRESSING SLOWER THAN EXPECTED  Monitor labs for electrolyte balance, provide fluids per orders.

## 2018-08-02 NOTE — PROGRESS NOTES
Patient becoming more alert at times, vocalizing a little more than just one word, took oxygen off, maintaining saturations during sleep.  Family in room, monitoring labs, na infusion patent and running per hospitalist order.

## 2018-08-02 NOTE — THERAPY
"Speech Language Therapy Clinical Swallow Evaluation completed.    Functional Status: Pt was AAOx2, with some confusion, but follows directives appropriately.  Pt complaining of pain in her neck and jaw, and was noted to be clenching her jaw, with her mandible slightly protruding forward, creating an under bite.  Pt's family reports this is not baseline for the patient.  Vocal quality and cough were slightly weak, however laryngeal elevation palpated as complete with all textures.  Pt consumed PO trials of ice chips, nectars and puree without s/sx of aspiration.  She had coughing with 75% of trials of thins, which is concerning for aspiration.  Pt attempted to chew trials of soft solids, however reported severe pain with mastication and spit them out.  Recommend to start a NTFL diet with standby assistance.      Recommendations - Diet: Nectar Thick Full Liquid                          Strategies: Monitor during meals, Assistance needed for meal tray set-up, No Straws and Head of Bed at 90 Degrees                          Medication Administration:  Crush all Medications in Puree    Plan of Care: Will benefit from Speech Therapy 3 times per week    Post-Acute Therapy: Discharge to a transitional care facility for continued skilled therapy services.    See \"Rehab Therapy-Acute\" Patient Summary Report for complete documentation. Thank you for the consult.  "

## 2018-08-02 NOTE — CARE PLAN
Problem: Safety  Goal: Will remain free from injury  Outcome: PROGRESSING AS EXPECTED  Family at beside, instructed to call staff in needed while in room, rounding hourly completed and more, patient less restless than yesterday.

## 2018-08-03 PROBLEM — G40.309 GENERALIZED NON-CONVULSIVE EPILEPSY (HCC): Status: ACTIVE | Noted: 2018-08-03

## 2018-08-03 LAB
ANION GAP SERPL CALC-SCNC: 6 MMOL/L (ref 0–11.9)
ANION GAP SERPL CALC-SCNC: 6 MMOL/L (ref 0–11.9)
ANION GAP SERPL CALC-SCNC: 7 MMOL/L (ref 0–11.9)
ANION GAP SERPL CALC-SCNC: 8 MMOL/L (ref 0–11.9)
BUN SERPL-MCNC: 10 MG/DL (ref 8–22)
BUN SERPL-MCNC: 5 MG/DL (ref 8–22)
CALCIUM SERPL-MCNC: 7.7 MG/DL (ref 8.5–10.5)
CALCIUM SERPL-MCNC: 8 MG/DL (ref 8.5–10.5)
CALCIUM SERPL-MCNC: 8.1 MG/DL (ref 8.5–10.5)
CALCIUM SERPL-MCNC: 8.2 MG/DL (ref 8.5–10.5)
CHLORIDE SERPL-SCNC: 101 MMOL/L (ref 96–112)
CHLORIDE SERPL-SCNC: 102 MMOL/L (ref 96–112)
CHLORIDE SERPL-SCNC: 104 MMOL/L (ref 96–112)
CHLORIDE SERPL-SCNC: 106 MMOL/L (ref 96–112)
CO2 SERPL-SCNC: 22 MMOL/L (ref 20–33)
CO2 SERPL-SCNC: 24 MMOL/L (ref 20–33)
CO2 SERPL-SCNC: 26 MMOL/L (ref 20–33)
CO2 SERPL-SCNC: 27 MMOL/L (ref 20–33)
CREAT SERPL-MCNC: 0.46 MG/DL (ref 0.5–1.4)
CREAT SERPL-MCNC: 0.47 MG/DL (ref 0.5–1.4)
CREAT SERPL-MCNC: 0.49 MG/DL (ref 0.5–1.4)
CREAT SERPL-MCNC: 0.53 MG/DL (ref 0.5–1.4)
GLUCOSE SERPL-MCNC: 113 MG/DL (ref 65–99)
GLUCOSE SERPL-MCNC: 114 MG/DL (ref 65–99)
GLUCOSE SERPL-MCNC: 119 MG/DL (ref 65–99)
GLUCOSE SERPL-MCNC: 96 MG/DL (ref 65–99)
POTASSIUM SERPL-SCNC: 3.3 MMOL/L (ref 3.6–5.5)
POTASSIUM SERPL-SCNC: 3.4 MMOL/L (ref 3.6–5.5)
POTASSIUM SERPL-SCNC: 3.5 MMOL/L (ref 3.6–5.5)
POTASSIUM SERPL-SCNC: 4 MMOL/L (ref 3.6–5.5)
SODIUM SERPL-SCNC: 134 MMOL/L (ref 135–145)
SODIUM SERPL-SCNC: 137 MMOL/L (ref 135–145)

## 2018-08-03 PROCEDURE — 700105 HCHG RX REV CODE 258: Performed by: INTERNAL MEDICINE

## 2018-08-03 PROCEDURE — 700101 HCHG RX REV CODE 250: Performed by: INTERNAL MEDICINE

## 2018-08-03 PROCEDURE — 80048 BASIC METABOLIC PNL TOTAL CA: CPT

## 2018-08-03 PROCEDURE — 99231 SBSQ HOSP IP/OBS SF/LOW 25: CPT | Performed by: INTERNAL MEDICINE

## 2018-08-03 PROCEDURE — 700102 HCHG RX REV CODE 250 W/ 637 OVERRIDE(OP): Performed by: INTERNAL MEDICINE

## 2018-08-03 PROCEDURE — 99233 SBSQ HOSP IP/OBS HIGH 50: CPT | Performed by: INTERNAL MEDICINE

## 2018-08-03 PROCEDURE — 700111 HCHG RX REV CODE 636 W/ 250 OVERRIDE (IP): Performed by: INTERNAL MEDICINE

## 2018-08-03 PROCEDURE — A9270 NON-COVERED ITEM OR SERVICE: HCPCS | Performed by: HOSPITALIST

## 2018-08-03 PROCEDURE — 36415 COLL VENOUS BLD VENIPUNCTURE: CPT

## 2018-08-03 PROCEDURE — A9270 NON-COVERED ITEM OR SERVICE: HCPCS | Performed by: INTERNAL MEDICINE

## 2018-08-03 PROCEDURE — 700102 HCHG RX REV CODE 250 W/ 637 OVERRIDE(OP): Performed by: HOSPITALIST

## 2018-08-03 PROCEDURE — 770006 HCHG ROOM/CARE - MED/SURG/GYN SEMI*

## 2018-08-03 RX ORDER — LEVETIRACETAM 500 MG/1
500 TABLET ORAL 2 TIMES DAILY
Status: DISCONTINUED | OUTPATIENT
Start: 2018-08-03 | End: 2018-08-09 | Stop reason: HOSPADM

## 2018-08-03 RX ORDER — ATORVASTATIN CALCIUM 40 MG/1
40 TABLET, FILM COATED ORAL EVERY EVENING
Status: DISCONTINUED | OUTPATIENT
Start: 2018-08-04 | End: 2018-08-09 | Stop reason: HOSPADM

## 2018-08-03 RX ORDER — SODIUM CHLORIDE AND POTASSIUM CHLORIDE 150; 900 MG/100ML; MG/100ML
INJECTION, SOLUTION INTRAVENOUS CONTINUOUS
Status: DISCONTINUED | OUTPATIENT
Start: 2018-08-03 | End: 2018-08-04

## 2018-08-03 RX ADMIN — SODIUM CHLORIDE: 9 INJECTION, SOLUTION INTRAVENOUS at 03:55

## 2018-08-03 RX ADMIN — LEVETIRACETAM 500 MG: 500 TABLET ORAL at 17:58

## 2018-08-03 RX ADMIN — SODIUM CHLORIDE 1000 MG: 9 INJECTION, SOLUTION INTRAVENOUS at 08:57

## 2018-08-03 RX ADMIN — STANDARDIZED SENNA CONCENTRATE AND DOCUSATE SODIUM 2 TABLET: 8.6; 5 TABLET, FILM COATED ORAL at 17:58

## 2018-08-03 RX ADMIN — ATORVASTATIN CALCIUM 80 MG: 80 TABLET, FILM COATED ORAL at 17:58

## 2018-08-03 RX ADMIN — POTASSIUM CHLORIDE AND SODIUM CHLORIDE: 900; 150 INJECTION, SOLUTION INTRAVENOUS at 07:51

## 2018-08-03 RX ADMIN — ASPIRIN 81 MG: 81 TABLET, CHEWABLE ORAL at 05:45

## 2018-08-03 RX ADMIN — STANDARDIZED SENNA CONCENTRATE AND DOCUSATE SODIUM 2 TABLET: 8.6; 5 TABLET, FILM COATED ORAL at 05:45

## 2018-08-03 RX ADMIN — RIVAROXABAN 20 MG: 20 TABLET, FILM COATED ORAL at 17:59

## 2018-08-03 ASSESSMENT — ENCOUNTER SYMPTOMS
CHILLS: 0
MEMORY LOSS: 1
PALPITATIONS: 0
HEADACHES: 0
DOUBLE VISION: 0
FEVER: 0

## 2018-08-03 ASSESSMENT — PAIN SCALES - GENERAL
PAINLEVEL_OUTOF10: 0

## 2018-08-03 NOTE — PROGRESS NOTES
Patient becoming more responsive, man cath, monitoring labs, diet advanced to thick liquids,c/o jaw huring, ice applied.  Family in room with patient.

## 2018-08-03 NOTE — CARE PLAN
Problem: Safety  Goal: Will remain free from injury  Outcome: PROGRESSING AS EXPECTED  Bed alarm is on if pt gets out of bed    Problem: Pain Management  Goal: Pain level will decrease to patient's comfort goal  Outcome: PROGRESSING AS EXPECTED

## 2018-08-03 NOTE — PROGRESS NOTES
Monitor Summary: SR 66-71, AR .20, QRS .08, QT .42 with rare beats of 2nd degree type 2 per strip from monitor room

## 2018-08-03 NOTE — PROGRESS NOTES
[facility] Hospitalist Progress Note    Date of Service: 8/3/2018    Chief Complaint  68 y.o. female with A. fib, recent diagnosis of stroke receiving TPA 5 weeks ago with complete resolution of symptoms admitted 7/31/2018 with altered level of consciousness with confusion, difficulty with speech, as well as following commands.  Initial labs showed sodium of 117 potassium 3.5.  CT of the head was unremarkable.  CTA of the head showed no occlusion or thrombosis.  Chest x-ray was NAD.  Neurology was consulted, felt that she did not have any CVA, but recommended brain MRI, and EEG.  Started on IV fluids.  Sodium remained very low, with continued encephalopathy.  Hypertonic saline was started.  Nephrology was consulted. Vitamin B12, ferritin, and folate normal.  Iron level normal.  Sodium has improved.  Hypertonic saline solution was discontinued. Posm 256, Uosm 338, Rafi 88 consistent with SIADH.  EEG showed underlying paroxysmal disorder with clear epileptiform potential, with sustained electroencephalographic seizure in one location with clinical phenomenology on video.  Brain MRI with and without contrast were within normal limits for age with mild to moderate atrophy and mild to moderate white matter changes.  Neurology consulted. OT recommended SNF placement.     Interval Problem Update  8/3/2018 - uneventful night. VSS. Afebrile. Saturating well on 1L O2 NC. Na now 134. K 3.4.  Neurology has seen the patient, recommended loading with Keppra. ?asymptomatic second-degree heart block on monitor.    > Seen and examined.  Maintaining awake mentation, but still confused, and very distractible.  Answers questions.  Knows that she is in the hospital.  Denied any pain.  No nausea, vomiting, diarrhea.  Discussed progress with daughter who is in the room.  Right arm peripheral IV infiltrated.      Consultants/Specialty  Neurology  Nephrology    Disposition  Monitor on neurology.  SNF placement.        ROS     Pertinent  positives/negatives as mentioned above.     A complete review of systems was done, limited by confusion and distractibility. All other systems were negative.      Physical Exam  Laboratory/Imaging   Hemodynamics  Temp (24hrs), Av.7 °C (98 °F), Min:36.3 °C (97.4 °F), Max:37 °C (98.6 °F)   Temperature: 36.7 °C (98.1 °F)  Pulse  Av.1  Min: 61  Max: 94    Blood Pressure : 136/72      Respiratory      Respiration: 18, Pulse Oximetry: 90 %             Fluids    Intake/Output Summary (Last 24 hours) at 18 0724  Last data filed at 18 0400   Gross per 24 hour   Intake                0 ml   Output             2550 ml   Net            -2550 ml       Nutrition  Orders Placed This Encounter   Procedures   • Diet Order Full Liquid     Standing Status:   Standing     Number of Occurrences:   1     Order Specific Question:   Diet:     Answer:   Full Liquid [11]     Order Specific Question:   Consistency/Fluid modifications:     Answer:   Nectar Thick [2]     Physical Exam   Constitutional: She appears well-developed and well-nourished. No distress.   HENT:   Head: Normocephalic and atraumatic.   Mouth/Throat: No oropharyngeal exudate.   Eyes: Pupils are equal, round, and reactive to light. Conjunctivae are normal. No scleral icterus.   Neck: Normal range of motion. Neck supple.   Cardiovascular: Normal rate and regular rhythm.  Exam reveals no gallop and no friction rub.    No murmur heard.  Pulmonary/Chest: Effort normal and breath sounds normal. No respiratory distress. She has no wheezes. She has no rales. She exhibits no tenderness.   Abdominal: Soft. Bowel sounds are normal. She exhibits no distension. There is no tenderness. There is no rebound and no guarding.   Musculoskeletal: She exhibits no tenderness.   Right arm IV infiltrated.   Lymphadenopathy:     She has no cervical adenopathy.   Neurological: She is alert. No cranial nerve deficit.   Unchanged mentation as yesterday, knows that she is in the  hospital, but still saying that it is spring.  Disoriented to time, but oriented to person.  Answers questions appropriately.   Skin: Skin is warm and dry. No rash noted. No erythema. No pallor.   Psychiatric:   Unable to assess due to confusion   Nursing note and vitals reviewed.      Recent Labs      07/31/18 2121 08/02/18   0155   WBC  9.0  11.0*   RBC  3.56*  3.92*   HEMOGLOBIN  11.5*  12.3   HEMATOCRIT  32.6*  34.4*   MCV  91.6  87.8   MCH  32.3  31.4   MCHC  35.3*  35.8*   RDW  41.1  38.5   PLATELETCT  241  246   MPV  10.3  10.5     Recent Labs      08/02/18   2144  08/03/18   0223  08/03/18   0603   SODIUM  126*  134*  134*   POTASSIUM  3.2*  3.3*  3.4*   CHLORIDE  96  101  102   CO2  23  26  24   GLUCOSE  144*  114*  119*   BUN  6*  5*  5*   CREATININE  0.43*  0.47*  0.46*   CALCIUM  8.1*  8.2*  8.0*     Recent Labs      07/31/18 2121   APTT  35.6   INR  1.56*                  Assessment/Plan     * Acute metabolic encephalopathy due to severe hyponatremia, nonconvulsive seizure- (present on admission)   Assessment & Plan    -Improved with improvement in sodium level, but still not at baseline likely due to nonconvulsive seizure.  -Start IV Keppra loading, followed by oral Keppra 500 mg twice a day.  Neurology on board.  -Sodium levels near normal.  Continue to monitor, as may need salt tablets if sodium drops again.  Nephrology following.  -Continue to monitor for improvement in mentation with starting antiepileptics.  -Continue PT/OT while in-house.  Needs SNF placement. CM/SW following.        Severe symptomatic hyponatremia, likely SIADH- (present on admission)   Assessment & Plan    -With encephalopathy/confusion due to severe hyponatremia.  Much improved mentation with improvement in her sodium levels.  Sodium improved at an appropriate rate, and now near normal.   -Off hypertonic fluid.  Continue normal saline, add 20 mEq of KCl for low potassium to improve distal tubular reabsorption of sodium.    -Decrease frequency of BMP to 12 hours. Continue close monitoring of sodium levels to ensure that she maintains her levels.  May needs salt tablets if sodium drops again.  Nephrology following         Generalized non-convulsive seizures (HCC)- (present on admission)   Assessment & Plan    -Start Keppra as above.  Repeat EEG in the morning to document resolution.        Hypokalemia- (present on admission)   Assessment & Plan    -I will change her fluids to normal saline with 20 mg of KCl at 83 cc/h. continue to monitor with BMP.        Urinary retention- (present on admission)   Assessment & Plan    -Urinating okay.  Continue bladder scans.        Acquired circulating anticoagulants (HCC)- (present on admission)   Assessment & Plan    -Continue home dose Xarelto for PAF.        Paroxysmal atrial fibrillation (HCC)- (present on admission)   Assessment & Plan    -Currently sinus.  Continue Xarelto.        Anemia- (present on admission)   Assessment & Plan    - no signs of acute bleeding.  Iron, ferritin, B12, folate levels normal.  Continue to monitor.  Restrictive transfusion, transfuse for hemoglobin less than 7.          Quality-Core Measures   Reviewed items::  Labs reviewed, Medications reviewed and Radiology images reviewed  Rosario catheter::  No Rosario  DVT: xarelto.  Assessed for rehabilitation services:  Patient was assess for and/or received rehabilitation services during this hospitalization

## 2018-08-03 NOTE — PROGRESS NOTES
Neurology Progress Note               Author: Carlos Worthy Date & Time created: 8/3/2018  1:37 PM     Interval History:  The patient appears a little bit brighter and more cooperative. She interacts more readily. She has no clue as to why she is in the hospital, though she does recognize she is in a hospital. She does not know how long she has been here. She does deny headache, nausea or vomiting, and she seems to be aware that she has memory problems will be on that very little. She does identify her daughter who is at the bedside. Mental processing speed is slowed.    She is now on Keppra 500 mg, twice a day, the loading dose was given only this morning. She is also on Xarelto, baby aspirin daily, and Lipitor. CMP now shows normalized sodium at 137, BUN/creatinine 5/0.49, glucose 113 and calcium 8.1.    Review of Systems:  Review of Systems   Constitutional: Negative for malaise/fatigue.   Eyes: Negative for double vision.   Neurological: Negative for headaches.   Psychiatric/Behavioral: Positive for memory loss.   All other systems reviewed and are negative.      Physical Exam:  Physical Exam   Constitutional: She appears well-developed and well-nourished.   HENT:   Head: Normocephalic and atraumatic.   Eyes: Pupils are equal, round, and reactive to light. EOM are normal.   Visual fields are full to confrontation.   Neurological:   She is oriented to person and the fact that she is in the hospital. She knows her date of birth but not her age. When told that date, immediate recall was intact, delayed recall is absent for any of the information. Frontal release signs are absent, there is no rostrocaudal extinction, and though there is no agnosia or apraxia, there is hand-object substitution with motor skills assessment. There is no inattention. She does not know her address, she is very slow to answer when asked to give the names of her children and siblings.    PERRLA/EOMI, visual fields grossly full, there  is no facial asymmetry or bulbar dysfunction. Temperature intact across the midline. Muscle skeletal exam reveals normal tone, there is no tremor, drift, or asterixis. Strength is 5/5 in all 4 tremors. There are no obvious reflex asymmetries, toes are now downgoing. There is no appendicular dystaxia with any of the extremities. I did not send the patient to walk. Repetitive movements appear intact and symmetric. Sensory exam is intact to pinprick. She is inconsistent with perception of vibration.   Nursing note and vitals reviewed.    Labs:        Invalid input(s): [id number]  Recent Labs      07/31/18 2121   TROPONINI  <0.01     Recent Labs      08/01/18   1356   08/03/18   0223  08/03/18   0603  08/03/18   0934   SODIUM  123*   < >  134*  134*  137   POTASSIUM  3.8   < >  3.3*  3.4*  3.5*   CHLORIDE  91*   < >  101  102  104   CO2  17*   < >  26  24  27   BUN  6*   < >  5*  5*  5*   CREATININE  0.65   < >  0.47*  0.46*  0.49*   MAGNESIUM  2.0   --    --    --    --    CALCIUM  7.7*   < >  8.2*  8.0*  8.1*    < > = values in this interval not displayed.     Recent Labs      07/31/18 2121 08/03/18   0223  08/03/18   0603  08/03/18   0934   ALTSGPT  33   --    --    --    --    ASTSGOT  41   --    --    --    --    ALKPHOSPHAT  77   --    --    --    --    TBILIRUBIN  0.7   --    --    --    --    GLUCOSE  147*   < >  114*  119*  113*    < > = values in this interval not displayed.     Recent Labs      07/31/18 2121 08/01/18   0509  08/02/18   0155   RBC  3.56*   --   3.92*   HEMOGLOBIN  11.5*   --   12.3   HEMATOCRIT  32.6*   --   34.4*   PLATELETCT  241   --   246   PROTHROMBTM  18.4*   --    --    APTT  35.6   --    --    INR  1.56*   --    --    IRON   --   256*   --    FERRITIN   --   134.8   --    TOTIRONBC   --   389   --      Recent Labs      07/31/18 2121  08/02/18   0155   WBC  9.0  11.0*   NEUTSPOLYS  72.10*  87.40*   LYMPHOCYTES  21.10*  7.40*   MONOCYTES  6.10  4.50   EOSINOPHILS  0.10   0.00   BASOPHILS  0.20  0.20   ASTSGOT  41   --    ALTSGPT  33   --    ALKPHOSPHAT  77   --    TBILIRUBIN  0.7   --      Recent Labs      18   0223  18   0603  18   0934   SODIUM  134*  134*  137   POTASSIUM  3.3*  3.4*  3.5*   CHLORIDE  101  102  104   CO2  26  24  27   GLUCOSE  114*  119*  113*   BUN  5*  5*  5*   CREATININE  0.47*  0.46*  0.49*   CALCIUM  8.2*  8.0*  8.1*     Hemodynamics:  Temp (24hrs), Av.8 °C (98.2 °F), Min:36.6 °C (97.9 °F), Max:37 °C (98.6 °F)  Temperature: 36.6 °C (97.9 °F)  Pulse  Av.6  Min: 60  Max: 94   Blood Pressure : 125/68     Respiratory:    Respiration: 16, Pulse Oximetry: 95 %           Fluids:    Intake/Output Summary (Last 24 hours) at 18 1337  Last data filed at 18 0400   Gross per 24 hour   Intake                0 ml   Output             1450 ml   Net            -1450 ml        GI/Nutrition:  Orders Placed This Encounter   Procedures   • Diet Order Full Liquid     Standing Status:   Standing     Number of Occurrences:   1     Order Specific Question:   Diet:     Answer:   Full Liquid [11]     Order Specific Question:   Consistency/Fluid modifications:     Answer:   Nectar Thick [2]     Medical Decision Making, by Problem:  Active Hospital Problems    Diagnosis   • *Acute metabolic encephalopathy due to severe hyponatremia, nonconvulsive seizure [G93.40]   • Severe symptomatic hyponatremia, likely SIADH [E87.1]   • Generalized non-convulsive seizures (HCC) [G40.309]   • Urinary retention [R33.9]   • Hypokalemia [E87.6]   • Paroxysmal atrial fibrillation (HCC) [I48.91]   • Acquired circulating anticoagulants (HCC) [D68.318]   • Anemia [D64.9]       Plan:  Seizures: I would anticipate continuing improvement now that Keppra has been loaded and she will remain on a standing dose for a few more days. This may take a little longer for more complete recovery though I would anticipate of near full recovery absent new structural pathology that has  been demonstrated with MRI. Side effects of Keppra including soporific nature of the drug were reviewed, we can always change the drug to another antiepileptic if needed. I would like to hold on this for now.    Dementia: Still the bigger issue, cognition will again I expect improved back to the baseline she had prior to all of this beginning, but it will take a little bit longer. Eventually Aricept 10 mg daily at bedtime and Namenda XR 28 mg daily will be added, we will wait for a couple of months until her seizure disorder is controlled and her clinical exam stabilized.    Neurologically, things are stable, she is safe to go to either a care facility or home with family with home health aides from a neurologic standpoint. Follow-up with me can be scheduled later this year. We can adjust medications over the phone, at least as it comes to her seizures.    Time: Total of 25 minutes spent face-to-face with the patient and her daughter for exam, review, discussion, and education, of this over 50% of the time spent face-to-face counseling and coordinating care      Quality-Core Measures

## 2018-08-03 NOTE — PROGRESS NOTES
Hospital Medicine Progress Note, Adult, Complex               Author: Ludwig Malave Date & Time created: 8/3/2018  3:00 PM     Interval History:  68 year old with hyponatremia, seizures. Na resolved. Off 3% saline. Has had sudden development of hyponatremia happen before, no clear cause at this time. Urine osmolality inaccurate due to 3% saline.    Review of Systems:  Review of Systems   Constitutional: Negative for chills and fever.   Cardiovascular: Negative for chest pain and palpitations.       Physical Exam:  Physical Exam   Constitutional: She appears well-developed and well-nourished.   Cardiovascular: Normal rate and regular rhythm.    Pulmonary/Chest: Effort normal and breath sounds normal.   Musculoskeletal: She exhibits no edema or tenderness.   Neurological: She is alert.       Labs:        Invalid input(s): [id number]  Recent Labs      07/31/18 2121   TROPONINI  <0.01     Recent Labs      08/01/18   1356   08/03/18 0223  08/03/18   0603 08/03/18   0934   SODIUM  123*   < >  134*  134*  137   POTASSIUM  3.8   < >  3.3*  3.4*  3.5*   CHLORIDE  91*   < >  101  102  104   CO2  17*   < >  26  24  27   BUN  6*   < >  5*  5*  5*   CREATININE  0.65   < >  0.47*  0.46*  0.49*   MAGNESIUM  2.0   --    --    --    --    CALCIUM  7.7*   < >  8.2*  8.0*  8.1*    < > = values in this interval not displayed.     Recent Labs      07/31/18 2121 08/03/18 0223 08/03/18   0603 08/03/18   0934   ALTSGPT  33   --    --    --    --    ASTSGOT  41   --    --    --    --    ALKPHOSPHAT  77   --    --    --    --    TBILIRUBIN  0.7   --    --    --    --    GLUCOSE  147*   < >  114*  119*  113*    < > = values in this interval not displayed.     Recent Labs      07/31/18 2121 08/01/18   0509  08/02/18   0155   RBC  3.56*   --   3.92*   HEMOGLOBIN  11.5*   --   12.3   HEMATOCRIT  32.6*   --   34.4*   PLATELETCT  241   --   246   PROTHROMBTM  18.4*   --    --    APTT  35.6   --    --    INR  1.56*   --     --    IRON   --   256*   --    FERRITIN   --   134.8   --    TOTIRONBC   --   389   --      Recent Labs      18   2121  18   0155   WBC  9.0  11.0*   NEUTSPOLYS  72.10*  87.40*   LYMPHOCYTES  21.10*  7.40*   MONOCYTES  6.10  4.50   EOSINOPHILS  0.10  0.00   BASOPHILS  0.20  0.20   ASTSGOT  41   --    ALTSGPT  33   --    ALKPHOSPHAT  77   --    TBILIRUBIN  0.7   --            Hemodynamics:  Temp (24hrs), Av.8 °C (98.3 °F), Min:36.6 °C (97.9 °F), Max:37 °C (98.6 °F)  Temperature: 36.6 °C (97.9 °F)  Pulse  Av.6  Min: 60  Max: 94   Blood Pressure : 125/68     Respiratory:    Respiration: 16, Pulse Oximetry: 95 %           Fluids:    Intake/Output Summary (Last 24 hours) at 18 1500  Last data filed at 18 0400   Gross per 24 hour   Intake                0 ml   Output             1450 ml   Net            -1450 ml        GI/Nutrition:  Orders Placed This Encounter   Procedures   • Diet Order Full Liquid     Standing Status:   Standing     Number of Occurrences:   1     Order Specific Question:   Diet:     Answer:   Full Liquid [11]     Order Specific Question:   Consistency/Fluid modifications:     Answer:   Nectar Thick [2]     Medical Decision Making, by Problem:  Active Hospital Problems    Diagnosis   • *Acute metabolic encephalopathy due to severe hyponatremia, nonconvulsive seizure [G93.40]   • Severe symptomatic hyponatremia, likely SIADH [E87.1]   • Generalized non-convulsive seizures (HCC) [G40.309]   • Urinary retention [R33.9]   • Hypokalemia [E87.6]   • Paroxysmal atrial fibrillation (HCC) [I48.91]   • Acquired circulating anticoagulants (HCC) [D68.318]   • Anemia [D64.9]     1. Hyponatremia, resolved. No clear cause at this time. Will follow. Likely will need steady state assessment of water metabolism    2. Mild hyponatremia - replete    Quality-Core Measures

## 2018-08-03 NOTE — CARE PLAN
Problem: Pain Management  Goal: Pain level will decrease to patient's comfort goal    Intervention: Follow pain managment plan developed in collaboration with patient and Interdisciplinary Team  Assess and document pain levels, medicated as indicated and ordered, provide ice and/or reposition for comfort.

## 2018-08-03 NOTE — CARE PLAN
Problem: Safety  Goal: Will remain free from injury  Outcome: PROGRESSING AS EXPECTED  Patient and family instructed on use of call light, not to get out of bed without assistance of staff.  Reinforcement for patient needed, family verbalized understanding.  Call light in reach, non slip footware on, bed in low position.

## 2018-08-03 NOTE — PROGRESS NOTES
Pt slept with her mouth open but denies having any pain in her jaw at this time. Daughter is currently at bedside at this time. Rosario intact. New IV placed. POC reviewed and call light within reach.

## 2018-08-03 NOTE — PROGRESS NOTES
Report given to Marisol NICKERSON on neuroscience. All questions asked and answered. Abraham went with and tele was removed per Dr Rivera.

## 2018-08-04 LAB
ANION GAP SERPL CALC-SCNC: 7 MMOL/L (ref 0–11.9)
BUN SERPL-MCNC: 9 MG/DL (ref 8–22)
CALCIUM SERPL-MCNC: 7.9 MG/DL (ref 8.5–10.5)
CHLORIDE SERPL-SCNC: 106 MMOL/L (ref 96–112)
CO2 SERPL-SCNC: 24 MMOL/L (ref 20–33)
CREAT SERPL-MCNC: 0.52 MG/DL (ref 0.5–1.4)
GLUCOSE SERPL-MCNC: 157 MG/DL (ref 65–99)
POTASSIUM SERPL-SCNC: 3.6 MMOL/L (ref 3.6–5.5)
SODIUM SERPL-SCNC: 137 MMOL/L (ref 135–145)

## 2018-08-04 PROCEDURE — 700102 HCHG RX REV CODE 250 W/ 637 OVERRIDE(OP): Performed by: INTERNAL MEDICINE

## 2018-08-04 PROCEDURE — 700101 HCHG RX REV CODE 250: Performed by: INTERNAL MEDICINE

## 2018-08-04 PROCEDURE — 99233 SBSQ HOSP IP/OBS HIGH 50: CPT | Performed by: INTERNAL MEDICINE

## 2018-08-04 PROCEDURE — 700102 HCHG RX REV CODE 250 W/ 637 OVERRIDE(OP): Performed by: HOSPITALIST

## 2018-08-04 PROCEDURE — 51798 US URINE CAPACITY MEASURE: CPT

## 2018-08-04 PROCEDURE — 36415 COLL VENOUS BLD VENIPUNCTURE: CPT

## 2018-08-04 PROCEDURE — A9270 NON-COVERED ITEM OR SERVICE: HCPCS | Performed by: HOSPITALIST

## 2018-08-04 PROCEDURE — 770006 HCHG ROOM/CARE - MED/SURG/GYN SEMI*

## 2018-08-04 PROCEDURE — A9270 NON-COVERED ITEM OR SERVICE: HCPCS | Performed by: INTERNAL MEDICINE

## 2018-08-04 PROCEDURE — 80048 BASIC METABOLIC PNL TOTAL CA: CPT

## 2018-08-04 RX ORDER — SODIUM CHLORIDE 1 G/1
1 TABLET ORAL 2 TIMES DAILY WITH MEALS
Status: DISCONTINUED | OUTPATIENT
Start: 2018-08-04 | End: 2018-08-07

## 2018-08-04 RX ORDER — POTASSIUM CHLORIDE 20 MEQ/1
20 TABLET, EXTENDED RELEASE ORAL DAILY
Status: DISCONTINUED | OUTPATIENT
Start: 2018-08-04 | End: 2018-08-09 | Stop reason: HOSPADM

## 2018-08-04 RX ADMIN — LEVETIRACETAM 500 MG: 500 TABLET ORAL at 17:57

## 2018-08-04 RX ADMIN — SODIUM CHLORIDE TAB 1 GM 1 G: 1 TAB at 09:26

## 2018-08-04 RX ADMIN — POTASSIUM CHLORIDE AND SODIUM CHLORIDE: 900; 150 INJECTION, SOLUTION INTRAVENOUS at 09:20

## 2018-08-04 RX ADMIN — RIVAROXABAN 20 MG: 20 TABLET, FILM COATED ORAL at 17:57

## 2018-08-04 RX ADMIN — SODIUM CHLORIDE TAB 1 GM 1 G: 1 TAB at 17:57

## 2018-08-04 RX ADMIN — POTASSIUM CHLORIDE 20 MEQ: 1500 TABLET, EXTENDED RELEASE ORAL at 14:49

## 2018-08-04 RX ADMIN — LEVETIRACETAM 500 MG: 500 TABLET ORAL at 06:14

## 2018-08-04 RX ADMIN — ATORVASTATIN CALCIUM 40 MG: 40 TABLET, FILM COATED ORAL at 17:57

## 2018-08-04 ASSESSMENT — PAIN SCALES - GENERAL
PAINLEVEL_OUTOF10: 0

## 2018-08-04 NOTE — PROGRESS NOTES
Patient alert and oriented upon assessment but forgetful, when resting in bed patient appears to be very tired. Assisted up into chair for breakfast and lunch, one assist to chair, patient is very unsteady on feet and may benefit from using walker or assistive device. Patient attempted to go to bathroom on own, had large amount of loose stool incontinence, CNA assisted with shower. Family at bedside visiting. Patient denies pain or discomfort throughout shift, will encourage patient to ambulate in hallway with staff. IV fluids continue at this time into left PIV. Tolerating full liquid meals well, no nausea or vomiting. Will continue to monitor, continue fall precaution protocol, bed and chair alarms at all times

## 2018-08-04 NOTE — DISCHARGE PLANNING
Received Choice form at [id number]  Agency[facility]Facility Name[facility] , and [facility]  Referral sent per Choice form @ 2056

## 2018-08-04 NOTE — PROGRESS NOTES
Pt A&Ox2, disoriented to event and time. Pt has global aphasia. Q2 turns in place. Rosario in place for retention. Bed alarm on, call light within reach, hourly rounding in place.

## 2018-08-04 NOTE — PROGRESS NOTES
Neurology Progress Note               Author: JAN Ragsdale Date & Time created: 2018  4:10 PM     Interval History:  No further episodes of encephalopathy.  Is more alert, remains confused.  No clinical seizures noted.    Review of Systems:  ROS    Physical Exam:  Physical Exam   Neurological:   Alert.  Conversant but confused, oriented to person and place but not to date.  PERRL, full EOM's.  Motor and DTR are =.       Labs:        Invalid input(s): [id number]      Recent Labs      18   0956   SODIUM  137  134*  137   POTASSIUM  3.5*  4.0  3.6   CHLORIDE  104  106  106   CO2  27  22  24   BUN  5*  10  9   CREATININE  0.49*  0.53  0.52   CALCIUM  8.1*  7.7*  7.9*     Recent Labs      1834  18   0956   GLUCOSE  113*  96  157*     Recent Labs      18   0155   RBC  3.92*   HEMOGLOBIN  12.3   HEMATOCRIT  34.4*   PLATELETCT  246     Recent Labs      18   0155   WBC  11.0*   NEUTSPOLYS  87.40*   LYMPHOCYTES  7.40*   MONOCYTES  4.50   EOSINOPHILS  0.00   BASOPHILS  0.20     Recent Labs      18   0956   SODIUM  137  134*  137   POTASSIUM  3.5*  4.0  3.6   CHLORIDE  104  106  106   CO2  27  22  24   GLUCOSE  113*  96  157*   BUN  5*  10  9   CREATININE  0.49*  0.53  0.52   CALCIUM  8.1*  7.7*  7.9*     Hemodynamics:  Temp (24hrs), Av.9 °C (98.4 °F), Min:36.4 °C (97.6 °F), Max:37.3 °C (99.1 °F)  Temperature: 36.4 °C (97.6 °F)  Pulse  Av  Min: 60  Max: 94   Blood Pressure : 140/84     Respiratory:    Respiration: 17, Pulse Oximetry: 96 %           Fluids:    Intake/Output Summary (Last 24 hours) at 18 1610  Last data filed at 18 1100   Gross per 24 hour   Intake              400 ml   Output             1775 ml   Net            -1375 ml     Weight: 56.5 kg (124 lb 9 oz)  GI/Nutrition:  Orders Placed This Encounter   Procedures   • Diet Order Full Liquid     Standing Status:    Standing     Number of Occurrences:   1     Order Specific Question:   Diet:     Answer:   Full Liquid [11]     Order Specific Question:   Consistency/Fluid modifications:     Answer:   Nectar Thick [2]     Medical Decision Making, by Problem:  Active Hospital Problems    Diagnosis   • *Acute metabolic encephalopathy due to severe hyponatremia, nonconvulsive seizure [G93.40]   • Severe symptomatic hyponatremia, likely SIADH [E87.1]   • Generalized non-convulsive seizures (HCC) [G40.309]   • Urinary retention [R33.9]   • Hypokalemia [E87.6]   • Paroxysmal atrial fibrillation (HCC) [I48.91]   • Acquired circulating anticoagulants (HCC) [D68.318]   • Anemia [D64.9]       Plan:  68 yr patient admitted 7/31 with encephalopathy and abnormal speech.  Found to be hyponatremic and EEG was paroxysmal.  Now on keppra, Na is 137.  She is improved, remains confused.  Was admitted 6/24-6/28/18 with speech changes and given iv tpa, MRI was negative for stroke but she was d/c'ed on xarelto due to suspicion of possible afib.  She is improving, at this point not sure if she has an underlying dementia.  Also, not clear as to indication for anticoagulant, the prior episode may well have been ictal.    Quality-Core Measures

## 2018-08-04 NOTE — DISCHARGE PLANNING
Anticipated Discharge Disposition:   SNF    Action:   Met with . He decided that they would choose [facility] as #1 choice, [facility] #2 and [facility] #3. Faxed choice to [facility]. Received complete fax confirmation.     Barriers to Discharge:   Pending HH acceptance  Medical clearance    Plan:   Follow up with [facility] and MD on Monday.

## 2018-08-04 NOTE — PROGRESS NOTES
Patient transferred to Neuroscience floor at this time. A&ox2, VSS. No complaints of pain or discomfort. No tele per recent MD order, IV patent. Safety precautions maintained. Family at bedside. Reviewing orders at this time and initiating them. Safety precautions maintained.

## 2018-08-04 NOTE — PROGRESS NOTES
[facility] Hospitalist Progress Note    Date of Service: 8/4/2018    Chief Complaint  68 y.o. female with A. fib, recent diagnosis of stroke receiving TPA 5 weeks ago with complete resolution of symptoms admitted 7/31/2018 with altered level of consciousness with confusion, difficulty with speech, as well as following commands.  Initial labs showed sodium of 117 potassium 3.5.  CT of the head was unremarkable.  CTA of the head showed no occlusion or thrombosis.  Chest x-ray was NAD.  Neurology was consulted, felt that she did not have any CVA, but recommended brain MRI, and EEG.  Started on IV fluids.  Sodium remained very low, with continued encephalopathy.  Hypertonic saline was started.  Nephrology was consulted. Vitamin B12, ferritin, and folate normal.  Iron level normal.  Sodium has improved.  Hypertonic saline solution was discontinued. Posm 256, Uosm 338, Rafi 88 consistent with SIADH.  EEG showed underlying paroxysmal disorder with clear epileptiform potential, with sustained electroencephalographic seizure in one location with clinical phenomenology on video.  Brain MRI with and without contrast were within normal limits for age with mild to moderate atrophy and mild to moderate white matter changes.  Neurology consulted, started on keppra. OT recommended SNF placement.     Interval Problem Update  8/4/2018 - no overnight events. Remains hemodynamically stable and afebrile. Stable on RA.  Sodium down to 134 again.    > Seen and examined.  More awake, but still has some of answers in orientation questions.  Oriented to person, and place, but not to time.  No [employer] is.  No chest pain, shortness of breath, nausea, vomiting, abdominal pain.        Consultants/Specialty  Neurology  Nephrology    Disposition  Monitor on neurology.  Needs SNF placement.        ROS     Pertinent positives/negatives as mentioned above.     A complete review of systems was done, limited by confusion. All other systems  "were negative.      Physical Exam  Laboratory/Imaging   Hemodynamics  Temp (24hrs), Av.8 °C (98.3 °F), Min:36.4 °C (97.6 °F), Max:37.3 °C (99.1 °F)   Temperature: 36.4 °C (97.6 °F)  Pulse  Av  Min: 60  Max: 94    Blood Pressure : 140/84      Respiratory      Respiration: 17, Pulse Oximetry: 96 %             Fluids    Intake/Output Summary (Last 24 hours) at 18 0800  Last data filed at 18 0615   Gross per 24 hour   Intake                0 ml   Output             2125 ml   Net            -2125 ml       Nutrition  Orders Placed This Encounter   Procedures   • Diet Order Full Liquid     Standing Status:   Standing     Number of Occurrences:   1     Order Specific Question:   Diet:     Answer:   Full Liquid [11]     Order Specific Question:   Consistency/Fluid modifications:     Answer:   Nectar Thick [2]     Physical Exam   Constitutional: She appears well-developed and well-nourished. No distress.   HENT:   Head: Normocephalic and atraumatic.   Mouth/Throat: No oropharyngeal exudate.   Eyes: Pupils are equal, round, and reactive to light. Conjunctivae are normal. No scleral icterus.   Neck: Normal range of motion. Neck supple.   Cardiovascular: Normal rate and regular rhythm.  Exam reveals no gallop and no friction rub.    No murmur heard.  Pulmonary/Chest: Effort normal and breath sounds normal. No respiratory distress. She has no wheezes. She has no rales. She exhibits no tenderness.   Abdominal: Soft. Bowel sounds are normal. She exhibits no distension. There is no tenderness. There is no rebound and no guarding.   Musculoskeletal: Normal range of motion. She exhibits no edema or tenderness.   Lymphadenopathy:     She has no cervical adenopathy.   Neurological: She is alert. No cranial nerve deficit.   More awake.  No she is in renal, but \"[place]\".  Thinks it is fall.  Able to name all her family in the room.   Skin: Skin is warm and dry. No rash noted. No erythema. No " pallor.   Psychiatric:   Flat affect, but answers questions appropriately.   Nursing note and vitals reviewed.        Recent Labs      08/02/18   0155   WBC  11.0*   RBC  3.92*   HEMOGLOBIN  12.3   HEMATOCRIT  34.4*   MCV  87.8   MCH  31.4   MCHC  35.8*   RDW  38.5   PLATELETCT  246   MPV  10.5     Recent Labs      08/03/18   0603  08/03/18   0934  08/03/18   2212   SODIUM  134*  137  134*   POTASSIUM  3.4*  3.5*  4.0   CHLORIDE  102  104  106   CO2  24  27  22   GLUCOSE  119*  113*  96   BUN  5*  5*  10   CREATININE  0.46*  0.49*  0.53   CALCIUM  8.0*  8.1*  7.7*                      Assessment/Plan     * Acute metabolic encephalopathy due to severe hyponatremia, nonconvulsive seizure- (present on admission)   Assessment & Plan    -Showing signs of improvement, albeit slowly.  Expect full return to baseline, but may take several days given poor reserve and baseline dementia.  -Continue oral Keppra 500 mg twice a day.  Neurology on board.  -Sodium levels fluctuating, remains near normal.  I will start her on salt tablets.  Continue to monitor sodium levels.  Nephrology on board.  -Continue to monitor for improvement in mentation.  -Continue PT/OT while in-house.  CM/SW working on SNF placement.         Severe symptomatic hyponatremia, likely SIADH- (present on admission)   Assessment & Plan    -With encephalopathy/confusion due to severe hyponatremia.  Much improved mentation with improvement in her sodium levels.  Sodium improved at an appropriate rate, although fluctuating but remains near normal.  -Start salt tablets 1 g twice daily.  Continue to monitor BMP.  -Continue potassium replacement as below.         Generalized non-convulsive seizures (HCC)- (present on admission)   Assessment & Plan    -Continue Keppra as above.  Await repeat EEG in the morning to document resolution.        Hypokalemia- (present on admission)   Assessment & Plan    -D/C IVF, and start Kdur 20mEq PO daily. Continue to monitor with  BMP.        Urinary retention- (present on admission)   Assessment & Plan    -Urinating well.  Continue bladder scans PRN.        Acquired circulating anticoagulants (HCC)- (present on admission)   Assessment & Plan    -Continue home dose Xarelto for PAF.        Paroxysmal atrial fibrillation (HCC)- (present on admission)   Assessment & Plan    -Currently sinus.  Continue Xarelto.        Anemia- (present on admission)   Assessment & Plan    - no signs of acute bleeding.  Iron, ferritin, B12, folate levels normal.  Continue to monitor.  Restrictive transfusion, transfuse for hemoglobin less than 7.          Quality-Core Measures   Reviewed items::  Labs reviewed, Medications reviewed and Radiology images reviewed  Rosario catheter::  No Rosario  DVT: xarelto.  Assessed for rehabilitation services:  Patient was assess for and/or received rehabilitation services during this hospitalization

## 2018-08-05 LAB
ANION GAP SERPL CALC-SCNC: 7 MMOL/L (ref 0–11.9)
ANION GAP SERPL CALC-SCNC: 8 MMOL/L (ref 0–11.9)
BUN SERPL-MCNC: 7 MG/DL (ref 8–22)
BUN SERPL-MCNC: 7 MG/DL (ref 8–22)
CALCIUM SERPL-MCNC: 8.4 MG/DL (ref 8.5–10.5)
CALCIUM SERPL-MCNC: 9.1 MG/DL (ref 8.5–10.5)
CHLORIDE SERPL-SCNC: 101 MMOL/L (ref 96–112)
CHLORIDE SERPL-SCNC: 103 MMOL/L (ref 96–112)
CO2 SERPL-SCNC: 27 MMOL/L (ref 20–33)
CO2 SERPL-SCNC: 28 MMOL/L (ref 20–33)
CREAT SERPL-MCNC: 0.45 MG/DL (ref 0.5–1.4)
CREAT SERPL-MCNC: 0.51 MG/DL (ref 0.5–1.4)
GLUCOSE SERPL-MCNC: 106 MG/DL (ref 65–99)
GLUCOSE SERPL-MCNC: 166 MG/DL (ref 65–99)
POTASSIUM SERPL-SCNC: 4.1 MMOL/L (ref 3.6–5.5)
POTASSIUM SERPL-SCNC: 4.2 MMOL/L (ref 3.6–5.5)
SODIUM SERPL-SCNC: 136 MMOL/L (ref 135–145)
SODIUM SERPL-SCNC: 138 MMOL/L (ref 135–145)

## 2018-08-05 PROCEDURE — 80048 BASIC METABOLIC PNL TOTAL CA: CPT

## 2018-08-05 PROCEDURE — 700102 HCHG RX REV CODE 250 W/ 637 OVERRIDE(OP): Performed by: HOSPITALIST

## 2018-08-05 PROCEDURE — 99233 SBSQ HOSP IP/OBS HIGH 50: CPT | Performed by: INTERNAL MEDICINE

## 2018-08-05 PROCEDURE — 99231 SBSQ HOSP IP/OBS SF/LOW 25: CPT | Performed by: INTERNAL MEDICINE

## 2018-08-05 PROCEDURE — 36415 COLL VENOUS BLD VENIPUNCTURE: CPT

## 2018-08-05 PROCEDURE — 700102 HCHG RX REV CODE 250 W/ 637 OVERRIDE(OP): Performed by: INTERNAL MEDICINE

## 2018-08-05 PROCEDURE — A9270 NON-COVERED ITEM OR SERVICE: HCPCS | Performed by: HOSPITALIST

## 2018-08-05 PROCEDURE — A9270 NON-COVERED ITEM OR SERVICE: HCPCS | Performed by: INTERNAL MEDICINE

## 2018-08-05 PROCEDURE — 770006 HCHG ROOM/CARE - MED/SURG/GYN SEMI*

## 2018-08-05 RX ADMIN — SODIUM CHLORIDE TAB 1 GM 1 G: 1 TAB at 18:40

## 2018-08-05 RX ADMIN — LEVETIRACETAM 500 MG: 500 TABLET ORAL at 18:40

## 2018-08-05 RX ADMIN — RIVAROXABAN 20 MG: 20 TABLET, FILM COATED ORAL at 18:40

## 2018-08-05 RX ADMIN — POTASSIUM CHLORIDE 20 MEQ: 1500 TABLET, EXTENDED RELEASE ORAL at 05:44

## 2018-08-05 RX ADMIN — ATORVASTATIN CALCIUM 40 MG: 40 TABLET, FILM COATED ORAL at 18:40

## 2018-08-05 RX ADMIN — SODIUM CHLORIDE TAB 1 GM 1 G: 1 TAB at 09:05

## 2018-08-05 RX ADMIN — LEVETIRACETAM 500 MG: 500 TABLET ORAL at 05:45

## 2018-08-05 ASSESSMENT — PAIN SCALES - GENERAL
PAINLEVEL_OUTOF10: 0

## 2018-08-05 ASSESSMENT — ENCOUNTER SYMPTOMS
FEVER: 0
CHILLS: 0

## 2018-08-05 NOTE — PROGRESS NOTES
Hospital Medicine Progress Note, Adult, Complex               Author: Ludwig Malave Date & Time created: 2018  2:00 PM     Interval History:  68 year old with hyponatremia, seizures. This is the second episode of rapid onset of hyponatremia, and like the first has improved rapidly. Noted issues with urinary retention.    Na now normalized, appears to be doing better.    Review of Systems:  Review of Systems   Constitutional: Negative for chills and fever.   All other systems reviewed and are negative.      Physical Exam:  Physical Exam   Constitutional: She appears well-developed and well-nourished.   Cardiovascular: Normal rate and regular rhythm.    Pulmonary/Chest: Effort normal and breath sounds normal.   Musculoskeletal: She exhibits no edema or tenderness.       Labs:        Invalid input(s): [id number]      Recent Labs      18   0956  18   0306  18   1014   SODIUM  137  138  136   POTASSIUM  3.6  4.1  4.2   CHLORIDE  106  103  101   CO2  24  27  28   BUN  9  7*  7*   CREATININE  0.52  0.51  0.45*   CALCIUM  7.9*  8.4*  9.1     Recent Labs      18   0956  18   0306  18   1014   GLUCOSE  157*  106*  166*     No results for input(s): RBC, HEMOGLOBIN, HEMATOCRIT, PLATELETCT, PROTHROMBTM, APTT, INR, IRON, FERRITIN, TOTIRONBC in the last 72 hours.            Hemodynamics:  Temp (24hrs), Av.6 °C (97.8 °F), Min:36.1 °C (97 °F), Max:36.8 °C (98.3 °F)  Temperature: 36.8 °C (98.3 °F)  Pulse  Av.3  Min: 60  Max: 94   Blood Pressure : 147/85     Respiratory:    Respiration: 17, Pulse Oximetry: 93 %           Fluids:    Intake/Output Summary (Last 24 hours) at 18 1400  Last data filed at 18 1300   Gross per 24 hour   Intake              740 ml   Output             1200 ml   Net             -460 ml     Weight: 58.6 kg (129 lb 3 oz)  GI/Nutrition:  Orders Placed This Encounter   Procedures   • Diet Order Full Liquid     Standing Status:   Standing      Number of Occurrences:   1     Order Specific Question:   Diet:     Answer:   Full Liquid [11]     Order Specific Question:   Consistency/Fluid modifications:     Answer:   Nectar Thick [2]     Medical Decision Making, by Problem:  Active Hospital Problems    Diagnosis   • *Acute metabolic encephalopathy due to severe hyponatremia, nonconvulsive seizure [G93.40]   • Severe symptomatic hyponatremia, likely SIADH [E87.1]   • Generalized non-convulsive seizures (HCC) [G40.309]   • Urinary retention [R33.9]   • Hypokalemia [E87.6]   • Paroxysmal atrial fibrillation (HCC) [I48.91]   • Acquired circulating anticoagulants (HCC) [D68.318]   • Anemia [D64.9]     1. Hyponatremia, resolved.  No clear cause established. However, water metabolism currently appears appropriate. No further testing indicated at this time. Will sign off, please call with any issues.    Quality-Core Measures

## 2018-08-05 NOTE — PROGRESS NOTES
Pt A&Ox2, disoriented to event and time. Pt has global aphasia. Pt ambulates x1 hand held assist. Q4 bladder scans in place, pt up urinating in toilet x2 this shift, urine clear. Bed alarm on, call light within reach, hourly rounding in place.

## 2018-08-05 NOTE — PROGRESS NOTES
Alert, oriented x person and place, not to date.  Much more responsive, remains confused.  Question underlying dementia.    PERRL, full EOM's.  Motor and DTR are =.  No seizures noted.    Hyponatremia, seizures resolved.  On keppra 500mg bid.  Not clear as to indication for anticonvulsants, prior episode may well have been ictal.    Will sign off, please call if you want further neurology input.  Can f/u with either me or Dr. Mendoza.

## 2018-08-05 NOTE — CARE PLAN
Problem: Safety  Goal: Will remain free from falls  Outcome: PROGRESSING AS EXPECTED  Bed alarm on. Call light in reach. Pt. Educated on the need to call prior to getting out of bed.     Problem: Pain Management  Goal: Pain level will decrease to patient's comfort goal  Outcome: PROGRESSING AS EXPECTED  Pt. Denies any complaints of pain at this time and resting in bed comfortably.

## 2018-08-05 NOTE — PROGRESS NOTES
[facility] Hospitalist Progress Note    Date of Service: 8/5/2018    Chief Complaint  68 y.o. female with A. fib, recent diagnosis of stroke receiving TPA 5 weeks ago with complete resolution of symptoms admitted 7/31/2018 with altered level of consciousness with confusion, difficulty with speech, as well as following commands.  Initial labs showed sodium of 117 potassium 3.5.  CT of the head was unremarkable.  CTA of the head showed no occlusion or thrombosis.  Chest x-ray was NAD.  Neurology was consulted, felt that she did not have any CVA, but recommended brain MRI, and EEG.  Started on IV fluids.  Sodium remained very low, with continued encephalopathy.  Hypertonic saline was started.  Nephrology was consulted. Vitamin B12, ferritin, and folate normal.  Iron level normal.  Sodium has improved.  Hypertonic saline solution was discontinued. Posm 256, Uosm 338, Rafi 88 consistent with SIADH.  Started on salt tablets. EEG showed underlying paroxysmal disorder with clear epileptiform potential, with sustained electroencephalographic seizure in one location with clinical phenomenology on video.  Brain MRI with and without contrast were within normal limits for age with mild to moderate atrophy and mild to moderate white matter changes.  Neurology consulted, started on keppra. OT recommended SNF placement.     Interval Problem Update  8/5/2018 -uneventful night. VSS. Afebrile. Saturating well on RA. Na normal at 138. K normal.     > Seen and examined.  Awake, and conversant.  Slowly improving mentation, thinks it is spring, and it was 2017, but [facility]  Unable to recite the days of the week backwards.  Denied any chest pain, shortness of breath, nausea, vomiting, abdominal pain.  Able to urinate on her own, but continues to retain more than 500 cc on bladder scan post voiding.      Consultants/Specialty  Neurology  Nephrology    Disposition  Monitor on neurology.  Needs SNF placement.        ROS      Pertinent positives/negatives as mentioned above.     A complete review of systems was done, limited by confusion. All other systems were negative.      Physical Exam  Laboratory/Imaging   Hemodynamics  Temp (24hrs), Av.5 °C (97.7 °F), Min:36.1 °C (97 °F), Max:36.7 °C (98 °F)   Temperature: 36.1 °C (97 °F)  Pulse  Av.5  Min: 60  Max: 94    Blood Pressure : 153/77      Respiratory      Respiration: 16, Pulse Oximetry: 95 %             Fluids    Intake/Output Summary (Last 24 hours) at 18 0752  Last data filed at 18   Gross per 24 hour   Intake              640 ml   Output                0 ml   Net              640 ml       Nutrition  Orders Placed This Encounter   Procedures   • Diet Order Full Liquid     Standing Status:   Standing     Number of Occurrences:   1     Order Specific Question:   Diet:     Answer:   Full Liquid [11]     Order Specific Question:   Consistency/Fluid modifications:     Answer:   Nectar Thick [2]     Physical Exam   Constitutional: She appears well-developed and well-nourished. No distress.   HENT:   Head: Normocephalic and atraumatic.   Mouth/Throat: No oropharyngeal exudate.   Eyes: Pupils are equal, round, and reactive to light. Conjunctivae are normal. No scleral icterus.   Neck: Normal range of motion. Neck supple.   Cardiovascular: Exam reveals no gallop and no friction rub.    No murmur heard.  Pulmonary/Chest: Effort normal and breath sounds normal. No respiratory distress. She has no wheezes. She has no rales. She exhibits no tenderness.   Abdominal: Soft. Bowel sounds are normal. She exhibits no distension. There is no tenderness. There is no rebound and no guarding.   Musculoskeletal: She exhibits no tenderness.   Lymphadenopathy:     She has no cervical adenopathy.   Neurological: She is alert. No cranial nerve deficit.   Mentation improving slowly.  Awake, and conversant.  Thinks it is spring, and it was , but knows that she is in [facility].   Unable to recite the days of the week backwards.    Skin: Skin is warm and dry. No rash noted. No erythema. No pallor.   Psychiatric:   Flat and blunted affect.   Nursing note and vitals reviewed.              Recent Labs      08/03/18   2212  08/04/18   0956  08/05/18   0306   SODIUM  134*  137  138   POTASSIUM  4.0  3.6  4.1   CHLORIDE  106  106  103   CO2  22  24  27   GLUCOSE  96  157*  106*   BUN  10  9  7*   CREATININE  0.53  0.52  0.51   CALCIUM  7.7*  7.9*  8.4*                      Assessment/Plan     * Acute metabolic encephalopathy due to severe hyponatremia, nonconvulsive seizure- (present on admission)   Assessment & Plan    -Improving slowly.  Full resolution of confusion may take several days given poor reserve and baseline dementia.  -Continue oral Keppra 500 mg twice a day.    -Maintaining normal sodium levels.  Continue salt tablets twice daily. Continue to monitor sodium levels.  Nephrology on board.  -Continue to monitor.  -Needs SNF placement. CM/SW on-board.         Severe symptomatic hyponatremia, likely SIADH- (present on admission)   Assessment & Plan    -Resolved.   - Continue salt tablets 1 g BID.  Continue to monitor BMP.  -Continue potassium replacement.        Generalized non-convulsive seizures (HCC)- (present on admission)   Assessment & Plan    -Continue Keppra as above.  Await repeat EEG to document resolution.        Hypokalemia- (present on admission)   Assessment & Plan    -K WNL. Continue Kdur 20mEq PO daily. BMP in the morning.         Urinary retention- (present on admission)   Assessment & Plan    -Continues to retain.  Reinsert Rosario catheter.        Acquired circulating anticoagulants (HCC)- (present on admission)   Assessment & Plan    -Continue home dose Xarelto for PAF.        Paroxysmal atrial fibrillation (HCC)- (present on admission)   Assessment & Plan    -Currently sinus.  Continue Xarelto.        Anemia- (present on admission)   Assessment & Plan    - no signs of  acute bleeding.  Iron, ferritin, B12, folate levels normal.  Continue to monitor.    - Restrictive transfusion, transfuse for hemoglobin less than 7.          Quality-Core Measures   Reviewed items::  Labs reviewed, Medications reviewed and Radiology images reviewed  Rosario catheter::  Neurogenic Bladder  DVT: xarelto.  Assessed for rehabilitation services:  Patient was assess for and/or received rehabilitation services during this hospitalization

## 2018-08-05 NOTE — PROGRESS NOTES
Patient getting up to bathroom and pulled out man catheter by accident, balloon still intact. No obvious signs of bleeding and patient denied pain, the pulling of the man catheter was not witnessed but assumed to be unintentional.  Provider made aware, new order in for voiding trial to start, please see orders.

## 2018-08-05 NOTE — PROGRESS NOTES
Patient assisted to bathroom one assist to offer toilet, voided 300ml, bladder scanned for over 500ml.  Provider made aware and asked RN to replace indwelling man catheter for rentention. Patient and family updated at bedside. Will place man catheter after patient done eating. Denies pain and discomfort.

## 2018-08-05 NOTE — PROGRESS NOTES
Replaced indwelling man catheter due to retention, over 500 ml noted on bladder scan and provider updated, no issues placing man catheter and patient tolerated placement well. Patient is confused and requires re-direction and cues, using walker to ambulate, walked with RN in hallway with one assist and walker, tolerated walk well. 900 ml of clear yellow urine drained from man. Continue fall protocol, bed and chair alarms at all times, patient is impulsive and forgetful.

## 2018-08-06 LAB
ANION GAP SERPL CALC-SCNC: 6 MMOL/L (ref 0–11.9)
BUN SERPL-MCNC: 8 MG/DL (ref 8–22)
CALCIUM SERPL-MCNC: 8.7 MG/DL (ref 8.5–10.5)
CHLORIDE SERPL-SCNC: 104 MMOL/L (ref 96–112)
CO2 SERPL-SCNC: 29 MMOL/L (ref 20–33)
CREAT SERPL-MCNC: 0.57 MG/DL (ref 0.5–1.4)
GLUCOSE SERPL-MCNC: 102 MG/DL (ref 65–99)
POTASSIUM SERPL-SCNC: 4.3 MMOL/L (ref 3.6–5.5)
SODIUM SERPL-SCNC: 139 MMOL/L (ref 135–145)

## 2018-08-06 PROCEDURE — 99233 SBSQ HOSP IP/OBS HIGH 50: CPT | Performed by: INTERNAL MEDICINE

## 2018-08-06 PROCEDURE — 36415 COLL VENOUS BLD VENIPUNCTURE: CPT

## 2018-08-06 PROCEDURE — A9270 NON-COVERED ITEM OR SERVICE: HCPCS | Performed by: HOSPITALIST

## 2018-08-06 PROCEDURE — 700102 HCHG RX REV CODE 250 W/ 637 OVERRIDE(OP): Performed by: HOSPITALIST

## 2018-08-06 PROCEDURE — 80048 BASIC METABOLIC PNL TOTAL CA: CPT

## 2018-08-06 PROCEDURE — 700102 HCHG RX REV CODE 250 W/ 637 OVERRIDE(OP): Performed by: INTERNAL MEDICINE

## 2018-08-06 PROCEDURE — 4A00X4Z MEASUREMENT OF CENTRAL NERVOUS ELECTRICAL ACTIVITY, EXTERNAL APPROACH: ICD-10-PCS | Performed by: PSYCHIATRY & NEUROLOGY

## 2018-08-06 PROCEDURE — A9270 NON-COVERED ITEM OR SERVICE: HCPCS | Performed by: INTERNAL MEDICINE

## 2018-08-06 PROCEDURE — 770006 HCHG ROOM/CARE - MED/SURG/GYN SEMI*

## 2018-08-06 PROCEDURE — 95951 EEG: CPT | Mod: 52

## 2018-08-06 RX ADMIN — POTASSIUM CHLORIDE 20 MEQ: 1500 TABLET, EXTENDED RELEASE ORAL at 06:19

## 2018-08-06 RX ADMIN — SODIUM CHLORIDE TAB 1 GM 1 G: 1 TAB at 09:12

## 2018-08-06 RX ADMIN — SODIUM CHLORIDE TAB 1 GM 1 G: 1 TAB at 17:35

## 2018-08-06 RX ADMIN — LEVETIRACETAM 500 MG: 500 TABLET ORAL at 06:19

## 2018-08-06 RX ADMIN — STANDARDIZED SENNA CONCENTRATE AND DOCUSATE SODIUM 2 TABLET: 8.6; 5 TABLET, FILM COATED ORAL at 17:35

## 2018-08-06 RX ADMIN — ATORVASTATIN CALCIUM 40 MG: 40 TABLET, FILM COATED ORAL at 17:35

## 2018-08-06 RX ADMIN — RIVAROXABAN 20 MG: 20 TABLET, FILM COATED ORAL at 17:35

## 2018-08-06 RX ADMIN — STANDARDIZED SENNA CONCENTRATE AND DOCUSATE SODIUM 2 TABLET: 8.6; 5 TABLET, FILM COATED ORAL at 06:19

## 2018-08-06 RX ADMIN — LEVETIRACETAM 500 MG: 500 TABLET ORAL at 17:36

## 2018-08-06 ASSESSMENT — PAIN SCALES - GENERAL
PAINLEVEL_OUTOF10: 0

## 2018-08-06 NOTE — PROGRESS NOTES
[facility] Hospitalist Progress Note    Date of Service: 8/6/2018    Chief Complaint  68 y.o. female with A. fib, recent diagnosis of stroke receiving TPA 5 weeks ago with complete resolution of symptoms admitted 7/31/2018 with altered level of consciousness with confusion, difficulty with speech, as well as following commands.  Initial labs showed sodium of 117 potassium 3.5.  CT of the head was unremarkable.  CTA of the head showed no occlusion or thrombosis.  Chest x-ray was NAD.  Neurology was consulted, felt that she did not have any CVA, but recommended brain MRI, and EEG.  Started on IV fluids.  Sodium remained very low, with continued encephalopathy.  Hypertonic saline was started.  Nephrology was consulted. Vitamin B12, ferritin, and folate normal.  Iron level normal.  Sodium has improved.  Hypertonic saline solution was discontinued. Posm 256, Uosm 338, Rafi 88 consistent with SIADH.  Started on salt tablets. EEG showed underlying paroxysmal disorder with clear epileptiform potential, with sustained electroencephalographic seizure in one location with clinical phenomenology on video.  Brain MRI with and without contrast were within normal limits for age with mild to moderate atrophy and mild to moderate white matter changes.  Neurology consulted, started on keppra.  Had issues with urinary retention, required Rosario catheter. OT/SLP recommended SNF placement.     Interval Problem Update  8/6/2018 - no overnight events. Remains hemodynamically stable and afebrile. Stable on RA.  BMP remained unimpressive, with normal sodium and potassium. PT recommended that she may be able to discharge to home with family.     > Seen and examined.  Sounding and looking much better today.  Not distractible, now able to recite the days of the week backwards and forwards.  Oriented to place, and time.  Denied any chest pain, shortness of breath, nausea, vomiting, abdominal pain, diarrhea.  Jaw feels looser, and not  tight.      Consultants/Specialty  Neurology  Nephrology    Disposition  Monitor on neurology.  Needs SNF placement.        ROS     Pertinent positives/negatives as mentioned above.     A complete review of systems was done. All other systems were negative.      Physical Exam  Laboratory/Imaging   Hemodynamics  Temp (24hrs), Av.7 °C (98.1 °F), Min:36.4 °C (97.5 °F), Max:36.8 °C (98.3 °F)   Temperature: 36.8 °C (98.2 °F)  Pulse  Av.9  Min: 60  Max: 95    Blood Pressure : 135/77      Respiratory      Respiration: 16, Pulse Oximetry: 97 %             Fluids    Intake/Output Summary (Last 24 hours) at 18 0749  Last data filed at 18 2200   Gross per 24 hour   Intake              550 ml   Output             1600 ml   Net            -1050 ml       Nutrition  Orders Placed This Encounter   Procedures   • Diet Order Full Liquid     Standing Status:   Standing     Number of Occurrences:   1     Order Specific Question:   Diet:     Answer:   Full Liquid [11]     Order Specific Question:   Consistency/Fluid modifications:     Answer:   Nectar Thick [2]     Physical Exam   Constitutional: She is oriented to person, place, and time. She appears well-developed and well-nourished. No distress.   HENT:   Head: Normocephalic and atraumatic.   Mouth/Throat: No oropharyngeal exudate.   Eyes: Pupils are equal, round, and reactive to light. Conjunctivae are normal. No scleral icterus.   Neck: Normal range of motion. Neck supple.   Cardiovascular: Normal rate and regular rhythm.  Exam reveals no gallop and no friction rub.    No murmur heard.  Pulmonary/Chest: Effort normal and breath sounds normal. No respiratory distress. She has no wheezes. She has no rales. She exhibits no tenderness.   Abdominal: Soft. Bowel sounds are normal. She exhibits no distension. There is no tenderness. There is no rebound and no guarding.   Musculoskeletal: Normal range of motion. She exhibits no edema or tenderness.   Lymphadenopathy:      She has no cervical adenopathy.   Neurological: She is alert and oriented to person, place, and time. No cranial nerve deficit.   Not distractible, now able to recite the days of the week backwards and forwards.  Oriented to place, and time.    Conversant and coherent.   Skin: Skin is warm and dry. No rash noted. No erythema. No pallor.   Psychiatric: She has a normal mood and affect. Her behavior is normal. Thought content normal.   Nursing note and vitals reviewed.            Recent Labs      08/05/18   0306  08/05/18   1014  08/06/18   0422   SODIUM  138  136  139   POTASSIUM  4.1  4.2  4.3   CHLORIDE  103  101  104   CO2  27  28  29   GLUCOSE  106*  166*  102*   BUN  7*  7*  8   CREATININE  0.51  0.45*  0.57   CALCIUM  8.4*  9.1  8.7                      Assessment/Plan     * Acute metabolic encephalopathy due to severe hyponatremia, nonconvulsive seizure- (present on admission)   Assessment & Plan    -Much improved today.    -Continue oral Keppra 500 mg twice a day.  Await repeat EEG to document resolution of her NCS  -Maintaining normal sodium levels.  Continue salt tablets twice daily. Continue to monitor sodium levels.  Nephrology on board.  -Continue to monitor.  -Await SNF placement. CM/SW on-board.         Severe symptomatic hyponatremia, likely SIADH- (present on admission)   Assessment & Plan    -Resolved.   -Continue salt tablets 1 g BID.  Continue to monitor BMP.  -Continue potassium replacement.        Generalized non-convulsive seizures (HCC)- (present on admission)   Assessment & Plan    -Continue Keppra.  Repeat EEG to be done today to document resolution.        Hypokalemia- (present on admission)   Assessment & Plan    -K remains WNL. Continue Kdur 20mEq PO daily. BMP in the morning.         Urinary retention- (present on admission)   Assessment & Plan    -Continues to retain.  Maintain on Rosario catheter.        Acquired circulating anticoagulants (HCC)- (present on admission)   Assessment &  Plan    -Continue home dose Xarelto for PAF.        Paroxysmal atrial fibrillation (HCC)- (present on admission)   Assessment & Plan    -Currently sinus.  Continue Xarelto.        Anemia- (present on admission)   Assessment & Plan    - no signs of acute bleeding.  Iron, ferritin, B12, folate levels normal.  Continue to monitor.    - Restrictive transfusion, transfuse for hemoglobin less than 7.          Quality-Core Measures   Reviewed items::  Labs reviewed, Medications reviewed and Radiology images reviewed  Rosario catheter::  Neurogenic Bladder  DVT: xarelto.  Assessed for rehabilitation services:  Patient was assess for and/or received rehabilitation services during this hospitalization

## 2018-08-06 NOTE — EEG PROGRESS NOTE
VIDEO ELECTROENCEPHALOGRAM REPORT      Referring MD: Dr. Rivera.     DOS: 8/6/2018 (total recording of 28 minutes).     INDICATION:  Azul Milan 68 y.o. female presenting with changes in speech and mentation. Rule out seizure.     CURRENT ANTIEPILEPTIC REGIMEN: Levetiracetam 500 mg bid.     TECHNIQUE: 30 channel video electroencephalogram (EEG) was performed in accordance with the international 10-20 system. The study was reviewed in bipolar and referential montages. The recording examined the patient during wakeful and drowsy/sleep state(s).     DESCRIPTION OF THE RECORD:  During the wakefulness, the background showed a symmetrical 12 Hz alpha activity posteriorly with amplitude of 70 mV.  There was reactivity to eye closure/opening.  A normal anterior-posterior gradient was noted with faster beta frequencies seen anteriorly.  During drowsiness, theta/delta frequencies were seen.    During the sleep state, background shows diffuse delta activity.      ACTIVATION PROCEDURES:   Intermittent Photic stimulation was performed in a stepwise fashion from 1 to 30 Hz, frequent frontal sharps noted.     ICTAL AND/OR INTERICTAL FINDINGS:   Frequent frontal sharps noted, these appears synchronous. No clinical events or seizures were reported or recorded during the study.     EKG: sampling of the EKG recording demonstrated sinus rhythm.    EVENTS:  None.     INTERPRETATION:  This is an abnormal video EEG recording in the awake, drowsy, and sleep state(s). Frequent frontal sharps noted. The findings increase risk for seizures and suggest underlying area(s) of cortical irritability and structural abnormality. No seizures captured during this study. Clinical and radiological correlation is recommended.    Melecio De La Paz MD   Epilepsy and Neurodiagnostics.   Clinical  of Neurology [facility].   Diplomate in Neurology, Epilepsy, and Electrodiagnostic  Medicine.   Office: [phone]  Fax: [phone]

## 2018-08-06 NOTE — PROCEDURES
VIDEO ELECTROENCEPHALOGRAM REPORT        Referring MD: Dr. Rivera.      DOS: 8/6/2018 (total recording of 28 minutes).      INDICATION:  Azul Milan 68 y.o. female presenting with changes in speech and mentation. Rule out seizure.      CURRENT ANTIEPILEPTIC REGIMEN: Levetiracetam 500 mg bid.      TECHNIQUE: 30 channel video electroencephalogram (EEG) was performed in accordance with the international 10-20 system. The study was reviewed in bipolar and referential montages. The recording examined the patient during wakeful and drowsy/sleep state(s).      DESCRIPTION OF THE RECORD:  During the wakefulness, the background showed a symmetrical 12 Hz alpha activity posteriorly with amplitude of 70 mV.  There was reactivity to eye closure/opening.  A normal anterior-posterior gradient was noted with faster beta frequencies seen anteriorly.  During drowsiness, theta/delta frequencies were seen.     During the sleep state, background shows diffuse delta activity.       ACTIVATION PROCEDURES:   Intermittent Photic stimulation was performed in a stepwise fashion from 1 to 30 Hz, frequent frontal sharps noted.      ICTAL AND/OR INTERICTAL FINDINGS:   Frequent frontal sharps noted, these appears synchronous. No clinical events or seizures were reported or recorded during the study.      EKG: sampling of the EKG recording demonstrated sinus rhythm.     EVENTS:  None.      INTERPRETATION:  This is an abnormal video EEG recording in the awake, drowsy, and sleep state(s). Frequent frontal sharps noted. The findings increase risk for seizures and suggest underlying area(s) of cortical irritability and structural abnormality. No seizures captured during this study. Clinical and radiological correlation is recommended.     Melecio De La Paz MD   Epilepsy and Neurodiagnostics.   Clinical  of Neurology [facility].   Diplomate in Neurology, Epilepsy, and  Electrodiagnostic Medicine.   Office: [phone]  Fax: [phone]       CATHY HIDALGO    DD:  08/06/2018 13:50:55  DT:  08/06/2018 14:24:46    D#:  [id number]  Job#:  [id number]

## 2018-08-06 NOTE — DISCHARGE PLANNING
Agency/Facility Name: [facility]  Spoke To: Kacie  Outcome: Patient accepted.    Agency/Facility Name: [facility]  Outcome: Attempted to follow up, however, no answer. Voicemail left for Jerrod.

## 2018-08-07 PROCEDURE — 97530 THERAPEUTIC ACTIVITIES: CPT

## 2018-08-07 PROCEDURE — 700102 HCHG RX REV CODE 250 W/ 637 OVERRIDE(OP): Performed by: HOSPITALIST

## 2018-08-07 PROCEDURE — 97535 SELF CARE MNGMENT TRAINING: CPT

## 2018-08-07 PROCEDURE — 770006 HCHG ROOM/CARE - MED/SURG/GYN SEMI*

## 2018-08-07 PROCEDURE — G8979 MOBILITY GOAL STATUS: HCPCS | Mod: CI

## 2018-08-07 PROCEDURE — A9270 NON-COVERED ITEM OR SERVICE: HCPCS | Performed by: HOSPITALIST

## 2018-08-07 PROCEDURE — 700102 HCHG RX REV CODE 250 W/ 637 OVERRIDE(OP): Performed by: INTERNAL MEDICINE

## 2018-08-07 PROCEDURE — A9270 NON-COVERED ITEM OR SERVICE: HCPCS | Performed by: INTERNAL MEDICINE

## 2018-08-07 PROCEDURE — G8980 MOBILITY D/C STATUS: HCPCS | Mod: CI

## 2018-08-07 PROCEDURE — 92526 ORAL FUNCTION THERAPY: CPT

## 2018-08-07 PROCEDURE — 99233 SBSQ HOSP IP/OBS HIGH 50: CPT | Performed by: INTERNAL MEDICINE

## 2018-08-07 RX ORDER — SODIUM CHLORIDE 1 G/1
1 TABLET ORAL DAILY
Status: DISCONTINUED | OUTPATIENT
Start: 2018-08-08 | End: 2018-08-08

## 2018-08-07 RX ADMIN — ATORVASTATIN CALCIUM 40 MG: 40 TABLET, FILM COATED ORAL at 21:36

## 2018-08-07 RX ADMIN — LEVETIRACETAM 500 MG: 500 TABLET ORAL at 05:19

## 2018-08-07 RX ADMIN — RIVAROXABAN 20 MG: 20 TABLET, FILM COATED ORAL at 18:38

## 2018-08-07 RX ADMIN — POTASSIUM CHLORIDE 20 MEQ: 1500 TABLET, EXTENDED RELEASE ORAL at 05:19

## 2018-08-07 RX ADMIN — LEVETIRACETAM 500 MG: 500 TABLET ORAL at 18:38

## 2018-08-07 RX ADMIN — SODIUM CHLORIDE TAB 1 GM 1 G: 1 TAB at 08:47

## 2018-08-07 ASSESSMENT — ENCOUNTER SYMPTOMS
PALPITATIONS: 0
DIZZINESS: 0
BLURRED VISION: 0
LOSS OF CONSCIOUSNESS: 0
ABDOMINAL PAIN: 0
DIAPHORESIS: 0
CHILLS: 0
FEVER: 0
COUGH: 0
BACK PAIN: 0
SPEECH CHANGE: 0
FOCAL WEAKNESS: 0
MEMORY LOSS: 1
NAUSEA: 0
FLANK PAIN: 0
SENSORY CHANGE: 0
DOUBLE VISION: 0
MYALGIAS: 0
HEADACHES: 0
TREMORS: 0
SHORTNESS OF BREATH: 0
NERVOUS/ANXIOUS: 0
VOMITING: 0
WEAKNESS: 0
SEIZURES: 1
DEPRESSION: 0

## 2018-08-07 ASSESSMENT — COGNITIVE AND FUNCTIONAL STATUS - GENERAL
DAILY ACTIVITIY SCORE: 24
SUGGESTED CMS G CODE MODIFIER MOBILITY: CH
SUGGESTED CMS G CODE MODIFIER DAILY ACTIVITY: CH
MOBILITY SCORE: 24

## 2018-08-07 ASSESSMENT — GAIT ASSESSMENTS
GAIT LEVEL OF ASSIST: MODIFIED INDEPENDENT
DISTANCE (FEET): 500

## 2018-08-07 ASSESSMENT — PAIN SCALES - GENERAL: PAINLEVEL_OUTOF10: 0

## 2018-08-07 NOTE — THERAPY
"Occupational Therapy Treatment completed with focus on ADLs, ADL transfers and patient education.  Functional Status:  Pt seen for OT tx. Pt able to complete LB dressing, standing at the sink to groom, UB dressing, toilet transfer and toileting w/ supv. Pt also completed tub transfer w/ supv. Pt reports that she will have assistance from her spouse at home as needed who will provide 24/7 supv as needed. Pt pleasant and cooperative.   Plan of Care: Pt has met current goals. Will discuss POC w/ OT regarding goals and frequency.   Discharge Recommendations:  Equipment No Equipment Needed.     See \"Rehab Therapy-Acute\" Patient Summary Report for complete documentation.   "

## 2018-08-07 NOTE — THERAPY
"Speech Language Therapy dysphagia treatment completed.   Functional Status:  Pt seen on this date for dysphagia treatment. Pt awake and oriented to self, hospital, and year with confusion towards city and month. Pt still appearing confused as she had difficulty recalling past events while admitted and prior to admit. Per RN, pt has been tolerating NTFL diet and pills crushed/whole in puree. PO trials of pt's NTFL breakfast and 4oz of soft solids were presented to pt and no overt s/sx of penetration/aspiration noted. Pt needed mod cueing to take smaller bites and appears impulsive at times. Upon palpation of larynx, laryngeal elevation noted to be complete. At this time, it is recommended that the pt be upgraded to dysphagia II/NTL diet with monitoring during meals.  present at bedside towards the end of the session so recommendation for diet upgrade was explained to him and pt and they verbalized understanding. Dysphagia and role of SLP education provided to pt and  and they verbalized understanding. SLP will continue to follow for dysphagia therapy.    Recommendations: dysphagia II/NTL, monitor during meals, assistance for meal tray set up, no straws, and head of bed at 90*   Plan of Care: Will benefit from Speech Therapy 3 times per week  Post-Acute Therapy: Discharge to a transitional care facility for continued skilled therapy services.    See \"Rehab Therapy-Acute\" Patient Summary Report for complete documentation.     "

## 2018-08-07 NOTE — PROGRESS NOTES
Patient's man catheter removed per order at this time. Patient is steady when ambulating but is very restless, constantly moving from chair to bed or wanting to ambulate in cooley. Bed alarm has been in use but patient's family is here and would like to walk with patient in cooley. Patient's bed alarm unplugged while family is here because she is ambulatory and a low fall risk now that man has been removed. MD states she is okay with  ambulating with patient. Patient did several laps in hallway and was observed to be steady.

## 2018-08-07 NOTE — PROGRESS NOTES
Care of pt assumed at 0700. Pt oriented to person only, at times to place. Patient up with standby assist and does not call for assistance. Bed alarm in place. Pt has man catheter for urinary retention. Catheter care done. Patient has tried to get up unassisted several times throughout shift. Fall education reinforced. No complaints throughout day.

## 2018-08-07 NOTE — CARE PLAN
Problem: Safety  Goal: Will remain free from injury    Intervention: Provide assistance with mobility  Educated and encouraged pt to use call light before getting up and for assistance. No evidence of learning noted.     Goal: Will remain free from falls    Intervention: Assess risk factors for falls  Bed alarm and chair alarm intact.

## 2018-08-07 NOTE — THERAPY
"Physical Therapy Treatment completed.   Bed Mobility:  Supine to Sit:  (found up with spouse)  Transfers: Sit to Stand: Modified Independent (pushed up from bedside)  Gait: Level Of Assist: Modified Independent with holding spouse's hand       Plan of Care: Patient with no further skilled PT needs in the acute care setting at this time  Discharge Recommendations: Equipment: No Equipment Needed. Post-acute therapy Currently anticipate no further skilled therapy needs once patient is discharged from the inpatient setting.    Pt has made excellent progress with her mobility and is now able to ambulate hallways with spouse. She demonstrated stability during seated and standing tasks. Spouse supportive and receptive to education regarding mobility. At this time, pt does not require further acute PT services and is functionally capable of return home. Anticipate she will NOT require HHPT at this time.      See \"Rehab Therapy-Acute\" Patient Summary Report for complete documentation.       "

## 2018-08-08 ENCOUNTER — HOME HEALTH ADMISSION (OUTPATIENT)
Dept: HOME HEALTH SERVICES | Facility: HOME HEALTHCARE | Age: 68
End: 2018-08-08
Payer: MEDICARE

## 2018-08-08 LAB
ANION GAP SERPL CALC-SCNC: 7 MMOL/L (ref 0–11.9)
BUN SERPL-MCNC: 15 MG/DL (ref 8–22)
CALCIUM SERPL-MCNC: 8.4 MG/DL (ref 8.5–10.5)
CHLORIDE SERPL-SCNC: 107 MMOL/L (ref 96–112)
CO2 SERPL-SCNC: 27 MMOL/L (ref 20–33)
CREAT SERPL-MCNC: 0.55 MG/DL (ref 0.5–1.4)
GLUCOSE SERPL-MCNC: 99 MG/DL (ref 65–99)
POTASSIUM SERPL-SCNC: 4.3 MMOL/L (ref 3.6–5.5)
SODIUM SERPL-SCNC: 141 MMOL/L (ref 135–145)

## 2018-08-08 PROCEDURE — 700102 HCHG RX REV CODE 250 W/ 637 OVERRIDE(OP): Performed by: INTERNAL MEDICINE

## 2018-08-08 PROCEDURE — A9270 NON-COVERED ITEM OR SERVICE: HCPCS | Performed by: INTERNAL MEDICINE

## 2018-08-08 PROCEDURE — 770006 HCHG ROOM/CARE - MED/SURG/GYN SEMI*

## 2018-08-08 PROCEDURE — 99233 SBSQ HOSP IP/OBS HIGH 50: CPT | Performed by: INTERNAL MEDICINE

## 2018-08-08 PROCEDURE — A9270 NON-COVERED ITEM OR SERVICE: HCPCS | Performed by: HOSPITALIST

## 2018-08-08 PROCEDURE — 80048 BASIC METABOLIC PNL TOTAL CA: CPT

## 2018-08-08 PROCEDURE — 36415 COLL VENOUS BLD VENIPUNCTURE: CPT

## 2018-08-08 PROCEDURE — 700102 HCHG RX REV CODE 250 W/ 637 OVERRIDE(OP): Performed by: HOSPITALIST

## 2018-08-08 RX ORDER — ATORVASTATIN CALCIUM 40 MG/1
40 TABLET, FILM COATED ORAL EVERY EVENING
Qty: 30 TAB | Refills: 2 | Status: SHIPPED | OUTPATIENT
Start: 2018-08-08 | End: 2018-10-30 | Stop reason: SDUPTHER

## 2018-08-08 RX ORDER — LEVETIRACETAM 500 MG/1
500 TABLET ORAL 2 TIMES DAILY
Qty: 60 TAB | Refills: 2 | Status: SHIPPED | OUTPATIENT
Start: 2018-08-08 | End: 2018-08-30 | Stop reason: SDUPTHER

## 2018-08-08 RX ADMIN — LEVETIRACETAM 500 MG: 500 TABLET ORAL at 05:26

## 2018-08-08 RX ADMIN — RIVAROXABAN 20 MG: 20 TABLET, FILM COATED ORAL at 17:45

## 2018-08-08 RX ADMIN — SODIUM CHLORIDE TAB 1 GM 1 G: 1 TAB at 05:26

## 2018-08-08 RX ADMIN — STANDARDIZED SENNA CONCENTRATE AND DOCUSATE SODIUM 2 TABLET: 8.6; 5 TABLET, FILM COATED ORAL at 17:44

## 2018-08-08 RX ADMIN — POTASSIUM CHLORIDE 20 MEQ: 1500 TABLET, EXTENDED RELEASE ORAL at 05:26

## 2018-08-08 RX ADMIN — ATORVASTATIN CALCIUM 40 MG: 40 TABLET, FILM COATED ORAL at 17:45

## 2018-08-08 RX ADMIN — LEVETIRACETAM 500 MG: 500 TABLET ORAL at 17:45

## 2018-08-08 ASSESSMENT — PATIENT HEALTH QUESTIONNAIRE - PHQ9
1. LITTLE INTEREST OR PLEASURE IN DOING THINGS: NOT AT ALL
SUM OF ALL RESPONSES TO PHQ9 QUESTIONS 1 AND 2: 0
2. FEELING DOWN, DEPRESSED, IRRITABLE, OR HOPELESS: NOT AT ALL

## 2018-08-08 ASSESSMENT — ENCOUNTER SYMPTOMS
FEVER: 0
WEAKNESS: 0
PALPITATIONS: 0
NERVOUS/ANXIOUS: 0
DOUBLE VISION: 0
CHILLS: 0
DIZZINESS: 0
MYALGIAS: 0
TREMORS: 0
MEMORY LOSS: 1
SHORTNESS OF BREATH: 0
COUGH: 0
BACK PAIN: 0
FLANK PAIN: 0
FOCAL WEAKNESS: 0
LOSS OF CONSCIOUSNESS: 0
NAUSEA: 0
DEPRESSION: 0
SEIZURES: 1
ABDOMINAL PAIN: 0

## 2018-08-08 NOTE — PROGRESS NOTES
Patient rounded on and was found to not be in room. Prior to this, patient's  had been visiting and patient's bed alarm had been unplugged because she is safe to ambulate in halls and to bathroom with . When patient's  left, he notified nursing and nursing went to patient's room to ensure she was in bed with bed alarm set, at around 1700. After patient was found to be missing from room, the unit was searched and  called. A code purple was called, security notified and several staff went searching the building for patient. Patient's daughter arrived to unit during this time and helped with search. Patient was found by her daughter in the parking garage, unharmed. Patient returned to room and a wander guard was placed on her left ankle. Bed strip alarm and frame alarm in place and set.

## 2018-08-08 NOTE — FACE TO FACE
Face to Face Supporting Documentation - Home Health    The encounter with this patient was in whole or in part the primary reason for home health admission.    Date of encounter:   Patient:                    MRN:                       YOB: 2019  Azul Milan  [id number]  1950     Home health to see patient for:  Skilled Nursing care for assessment, interventions & education, Physical Therapy evaluation and treatment, Occupational therapy evaluation and treatment and Speech Language Pathology evaluation and treatment    Skilled need for:  New Onset Medical Diagnosis seizure disorder    Skilled nursing interventions to include:  Comment: pt/ot/slp    Homebound status evidenced by:  Need the aid of supportive devices such as crutches, canes, wheelchairs or walkers or Needs the assistance of another person in order to leave the home. Leaving home requires a considerable and taxing effort. There is a normal inability to leave the home.    Community Physician to provide follow up care: Rachelle Isaac M.D.     Optional Interventions? No      I certify the face to face encounter for this home health care referral meets the CMS requirements and the encounter/clinical assessment with the patient was, in whole, or in part, for the medical condition(s) listed above, which is the primary reason for home health care. Based on my clinical findings: the service(s) are medically necessary, support the need for home health care, and the homebound criteria are met.  I certify that this patient has had a face to face encounter by myself.  Rika Bella D.O. - NPI: [id number]

## 2018-08-08 NOTE — PROGRESS NOTES
Pt rested well during the night. Vital signs stable being tachycardic at times. Heart rate staying in low 100's. Pt remains afebrile. Pt does not call for assistance, bed alarm in place. Pt with wanderguard on. Hourly rounding in place. Bed lowered, locked and call light within reach.

## 2018-08-08 NOTE — PROGRESS NOTES
[facility] Hospitalist Progress Note    Date of Service: 8/7/2018    Chief Complaint  68 y.o. female with A. fib, recent diagnosis of stroke receiving TPA 5 weeks ago with complete resolution of symptoms admitted 7/31/2018 with altered level of consciousness with confusion, difficulty with speech, as well as following commands.  Initial labs showed sodium of 117 potassium 3.5.  CT of the head was unremarkable.  CTA of the head showed no occlusion or thrombosis.  Chest x-ray was NAD.  Neurology was consulted, felt that she did not have any CVA, but recommended brain MRI, and EEG.  Started on IV fluids.  Sodium remained very low, with continued encephalopathy.  Hypertonic saline was started.  Nephrology was consulted. Vitamin B12, ferritin, and folate normal.  Iron level normal.  Sodium has improved.  Hypertonic saline solution was discontinued. Posm 256, Uosm 338, Rafi 88 consistent with SIADH.  Started on salt tablets. EEG showed underlying paroxysmal disorder with clear epileptiform potential, with sustained electroencephalographic seizure in one location with clinical phenomenology on video.  Brain MRI with and without contrast were within normal limits for age with mild to moderate atrophy and mild to moderate white matter changes.  Neurology consulted, started on keppra.  Had issues with urinary retention, required Rosario catheter. OT/SLP recommended SNF placement.     Interval Problem Update  8/6/2018 - no overnight events. Remains hemodynamically stable and afebrile. Stable on RA.  BMP remained unimpressive, with normal sodium and potassium. PT recommended that she may be able to discharge to home with family.     8/7 improving mentation and physical ability  Ambulating with standby assist  No seizure activities noted  Following all directions  Has 24-hour support with family,  at bedside, plan of care discussed    Apparently confused and forgetful this afternoon, attempted to leave the hospital  Was found by  staff  Intermittent confusion        Consultants/Specialty  Neurology  Nephrology    Disposition  To home with home health in 1-2 days with clinical improvement  Will need 24 7 support with family  Addendum per staff unable to locate patient at 5:50 PM:        Review of Systems   Constitutional: Negative for chills, diaphoresis, fever and malaise/fatigue.   HENT: Negative for congestion and hearing loss.    Eyes: Negative for blurred vision and double vision.   Respiratory: Negative for cough and shortness of breath.    Cardiovascular: Negative for chest pain, palpitations and leg swelling.   Gastrointestinal: Negative for abdominal pain, nausea and vomiting.   Genitourinary: Negative for dysuria and flank pain.   Musculoskeletal: Negative for back pain, joint pain and myalgias.   Neurological: Positive for seizures. Negative for dizziness, tremors, sensory change, speech change, focal weakness, loss of consciousness, weakness and headaches.   Psychiatric/Behavioral: Positive for memory loss. Negative for depression. The patient is not nervous/anxious.         Pertinent positives/negatives as mentioned above.     A complete review of systems was done. All other systems were negative.      Physical Exam  Laboratory/Imaging   Hemodynamics  Temp (24hrs), Av.8 °C (98.3 °F), Min:36.6 °C (97.8 °F), Max:37.2 °C (99 °F)   Temperature: 37.2 °C (99 °F)  Pulse  Av.4  Min: 60  Max: 97    Blood Pressure : 115/63      Respiratory      Respiration: 15, Pulse Oximetry: 96 %             Fluids    Intake/Output Summary (Last 24 hours) at 18 1754  Last data filed at 18 1146   Gross per 24 hour   Intake              720 ml   Output             1700 ml   Net             -980 ml       Nutrition  Orders Placed This Encounter   Procedures   • Diet Order Regular     Standing Status:   Standing     Number of Occurrences:   1     Order Specific Question:   Diet:     Answer:   Regular [1]     Order Specific Question:    Texture/Fiber modifications:     Answer:   Dysphagia 2(Pureed/Chopped)specify fluid consistency(question 6) [2]     Order Specific Question:   Consistency/Fluid modifications:     Answer:   Nectar Thick [2]     Physical Exam   Constitutional: She appears well-developed and well-nourished. No distress.   HENT:   Head: Normocephalic and atraumatic.   Mouth/Throat: No oropharyngeal exudate.   Eyes: Pupils are equal, round, and reactive to light. Conjunctivae and EOM are normal.   Neck: Normal range of motion. Neck supple.   Cardiovascular: Normal rate, regular rhythm and intact distal pulses.    No murmur heard.  Pulmonary/Chest: Effort normal and breath sounds normal. No respiratory distress. She has no wheezes. She has no rales. She exhibits no tenderness.   Abdominal: Soft. Bowel sounds are normal. She exhibits no distension. There is no tenderness.   Musculoskeletal: Normal range of motion. She exhibits no edema.   Neurological: She is alert. No cranial nerve deficit. Coordination normal.   Not distractible, now able to recite the days of the week backwards and forwards.  Oriented to place, and time.    Conversant and coherent.   Skin: Skin is warm and dry. She is not diaphoretic. No erythema. No pallor.   Psychiatric: She has a normal mood and affect. Her behavior is normal. Thought content normal.   Nursing note and vitals reviewed.            Recent Labs      08/05/18   0306  08/05/18   1014  08/06/18   0422   SODIUM  138  136  139   POTASSIUM  4.1  4.2  4.3   CHLORIDE  103  101  104   CO2  27  28  29   GLUCOSE  106*  166*  102*   BUN  7*  7*  8   CREATININE  0.51  0.45*  0.57   CALCIUM  8.4*  9.1  8.7                      Assessment/Plan     * Acute metabolic encephalopathy due to severe hyponatremia, nonconvulsive seizure- (present on admission)   Assessment & Plan    -Much improved today.    -Continue oral Keppra 500 mg twice a day.    - EEG + abnormal, with frontal cortical abnormality  -Maintaining normal  sodium levels.  Taper salt tabs, sodium within normal limits  -Continue to monitor.  -Skilled nursing facility evaluation versus home with home health in 1-2 days  Ambulating with standby assist        Severe symptomatic hyponatremia, likely SIADH- (present on admission)   Assessment & Plan    -Resolved.   -Continue potassium replacement.        Generalized non-convulsive seizures (HCC)- (present on admission)   Assessment & Plan    -Continue Keppra.         Hypokalemia- (present on admission)   Assessment & Plan    -K remains WNL. Continue Kdur 20mEq PO daily. BMP in the morning.         Urinary retention- (present on admission)   Assessment & Plan    -Continues to retain.    Status post Rosario catheter removal, continue bladder scan post protocol        Acquired circulating anticoagulants (HCC)- (present on admission)   Assessment & Plan    -Continue home dose Xarelto for PAF.        Paroxysmal atrial fibrillation (HCC)- (present on admission)   Assessment & Plan    -Currently sinus.  Continue Xarelto.        Anemia- (present on admission)   Assessment & Plan    - no signs of acute bleeding.  Iron, ferritin, B12, folate levels normal.  Continue to monitor.    - Restrictive transfusion, transfuse for hemoglobin less than 7.          Quality-Core Measures   Reviewed items::  Labs reviewed, Medications reviewed and Radiology images reviewed  Rosario catheter::  Neurogenic Bladder  DVT: xarelto.  Assessed for rehabilitation services:  Patient was assess for and/or received rehabilitation services during this hospitalization

## 2018-08-08 NOTE — PROGRESS NOTES
[facility] Hospitalist Progress Note    Date of Service: 8/8/2018    Chief Complaint  68 y.o. female with A. fib, recent diagnosis of stroke receiving TPA 5 weeks ago with complete resolution of symptoms admitted 7/31/2018 with altered level of consciousness with confusion, difficulty with speech, as well as following commands.  Initial labs showed sodium of 117 potassium 3.5.  CT of the head was unremarkable.  CTA of the head showed no occlusion or thrombosis.  Chest x-ray was NAD.  Neurology was consulted, felt that she did not have any CVA, but recommended brain MRI, and EEG.  Started on IV fluids.  Sodium remained very low, with continued encephalopathy.  Hypertonic saline was started.  Nephrology was consulted. Vitamin B12, ferritin, and folate normal.  Iron level normal.  Sodium has improved.  Hypertonic saline solution was discontinued. Posm 256, Uosm 338, Rafi 88 consistent with SIADH.  Started on salt tablets. EEG showed underlying paroxysmal disorder with clear epileptiform potential, with sustained electroencephalographic seizure in one location with clinical phenomenology on video.  Brain MRI with and without contrast were within normal limits for age with mild to moderate atrophy and mild to moderate white matter changes.  Neurology consulted, started on keppra.  Had issues with urinary retention, required Rosario catheter. OT/SLP recommended SNF placement.     Interval Problem Update  8/6/2018 - no overnight events. Remains hemodynamically stable and afebrile. Stable on RA.  BMP remained unimpressive, with normal sodium and potassium. PT recommended that she may be able to discharge to home with family.     8/7 improving mentation and physical ability  Ambulating with standby assist  No seizure activities noted  Following all directions  Has 24-hour support with family,  at bedside, plan of care discussed    Apparently confused and forgetful this afternoon, attempted to leave the hospital  Was found by  staff  Intermittent confusion     oriented ×3   at bedside  Events on  discussed with patient and  at bedside  They expressed their concerns regarding worsening dementia  Patient will need 24-hour assistance, which the  is able to provide  No other physical deficits at this time.      Consultants/Specialty  Neurology  Nephrology    Disposition  To home with home health in 1-2 days with clinical improvement  Will need 24 7 support with family          Review of Systems   Constitutional: Negative for chills, fever and malaise/fatigue.   HENT: Negative for congestion and hearing loss.    Eyes: Negative for double vision.   Respiratory: Negative for cough and shortness of breath.    Cardiovascular: Negative for palpitations and leg swelling.   Gastrointestinal: Negative for abdominal pain and nausea.   Genitourinary: Negative for flank pain.   Musculoskeletal: Negative for back pain, joint pain and myalgias.   Neurological: Positive for seizures. Negative for dizziness, tremors, focal weakness, loss of consciousness and weakness.   Psychiatric/Behavioral: Positive for memory loss. Negative for depression. The patient is not nervous/anxious.         Pertinent positives/negatives as mentioned above.     A complete review of systems was done. All other systems were negative.      Physical Exam  Laboratory/Imaging   Hemodynamics  Temp (24hrs), Av.9 °C (98.4 °F), Min:36.3 °C (97.4 °F), Max:37.3 °C (99.2 °F)   Temperature: 37.3 °C (99.2 °F)  Pulse  Av.3  Min: 51  Max: 107    Blood Pressure : (!) 99/52 (RN notified )      Respiratory      Respiration: 16, Pulse Oximetry: 95 %             Fluids    Intake/Output Summary (Last 24 hours) at 18 1435  Last data filed at 18 2136   Gross per 24 hour   Intake                0 ml   Output              550 ml   Net             -550 ml       Nutrition  Orders Placed This Encounter   Procedures   • Diet Order Regular     Standing  Status:   Standing     Number of Occurrences:   1     Order Specific Question:   Diet:     Answer:   Regular [1]     Order Specific Question:   Texture/Fiber modifications:     Answer:   Dysphagia 2(Pureed/Chopped)specify fluid consistency(question 6) [2]     Order Specific Question:   Consistency/Fluid modifications:     Answer:   Nectar Thick [2]     Physical Exam   Constitutional: She appears well-developed and well-nourished. No distress.   HENT:   Head: Normocephalic and atraumatic.   Eyes: Pupils are equal, round, and reactive to light. EOM are normal. Right eye exhibits no discharge. Left eye exhibits no discharge.   Neck: Normal range of motion. Neck supple.   Cardiovascular: Normal rate, regular rhythm and intact distal pulses.    No murmur heard.  Pulmonary/Chest: Effort normal and breath sounds normal. No respiratory distress. She exhibits no tenderness.   Abdominal: Soft. Bowel sounds are normal. She exhibits no distension. There is no tenderness.   Musculoskeletal: Normal range of motion. She exhibits no edema.   Neurological: She is alert. No cranial nerve deficit. She exhibits normal muscle tone.   Not distractible, now able to recite the days of the week backwards and forwards.  Oriented to place, and time.    Conversant and coherent.   Skin: Skin is warm and dry.   Psychiatric: She has a normal mood and affect. Her behavior is normal. Judgment and thought content normal.   Nursing note and vitals reviewed.            Recent Labs      08/06/18   0422  08/08/18   0308   SODIUM  139  141   POTASSIUM  4.3  4.3   CHLORIDE  104  107   CO2  29  27   GLUCOSE  102*  99   BUN  8  15   CREATININE  0.57  0.55   CALCIUM  8.7  8.4*                      Assessment/Plan     * Acute metabolic encephalopathy due to severe hyponatremia, nonconvulsive seizure- (present on admission)   Assessment & Plan    -Much improved today.    -Continue oral Keppra 500 mg twice a day.    - EEG + abnormal, with frontal cortical  abnormality  -Maintaining normal sodium levels.  Discontinue salt tabs, sodium within normal limits  -Continue to monitor.  -Skilled nursing facility evaluation versus home with home health in 1-2 days  Ambulating with standby assist        Severe symptomatic hyponatremia, likely SIADH- (present on admission)   Assessment & Plan    -Resolved.   -Continue potassium replacement.        Generalized non-convulsive seizures (HCC)- (present on admission)   Assessment & Plan    -Continue Keppra.         Hypokalemia- (present on admission)   Assessment & Plan    -K remains WNL. Continue Kdur 20mEq PO daily. BMP in the morning.         Urinary retention- (present on admission)   Assessment & Plan    -Continues to retain.    Status post Rosario catheter removal, continue bladder scan post protocol        Acquired circulating anticoagulants (HCC)- (present on admission)   Assessment & Plan    -Continue home dose Xarelto for PAF.        Paroxysmal atrial fibrillation (HCC)- (present on admission)   Assessment & Plan    -Currently sinus.  Continue Xarelto.        Anemia- (present on admission)   Assessment & Plan    - no signs of acute bleeding.  Iron, ferritin, B12, folate levels normal.  Continue to monitor.    - Restrictive transfusion, transfuse for hemoglobin less than 7.          Quality-Core Measures   Reviewed items::  Labs reviewed, Medications reviewed and Radiology images reviewed  Rosario catheter::  Neurogenic Bladder  DVT: xarelto.  Assessed for rehabilitation services:  Patient was assess for and/or received rehabilitation services during this hospitalization

## 2018-08-08 NOTE — DISCHARGE PLANNING
Anticipated Discharge Disposition: Home with HH    Action: LSW met with patient and  at bedside. Received HH CHOICE 1. [facility]. LSW faxed CHOICE to Zonia ROJAS.    Barriers to Discharge: HH acceptance.     Plan: LSW to continue to assist with d/c as needed.

## 2018-08-08 NOTE — DISCHARGE PLANNING
Received Choice form at 0399 from Pranay(DILCIA)  Agency/Facility Name: [facility]  Referral sent per Choice form @ [phone]

## 2018-08-08 NOTE — DISCHARGE PLANNING
We are currently verifying MD acceptance of your patient. This will be resolved ASAP. If you want this escalated for a faster response please call [phone]. Thank you for your patience.  Respectfully,   [facility]

## 2018-08-09 ENCOUNTER — PATIENT OUTREACH (OUTPATIENT)
Dept: HEALTH INFORMATION MANAGEMENT | Facility: OTHER | Age: 68
End: 2018-08-09

## 2018-08-09 VITALS
BODY MASS INDEX: 22.89 KG/M2 | TEMPERATURE: 99.4 F | HEIGHT: 63 IN | OXYGEN SATURATION: 97 % | WEIGHT: 129.19 LBS | SYSTOLIC BLOOD PRESSURE: 126 MMHG | RESPIRATION RATE: 16 BRPM | HEART RATE: 83 BPM | DIASTOLIC BLOOD PRESSURE: 72 MMHG

## 2018-08-09 LAB
ANION GAP SERPL CALC-SCNC: 5 MMOL/L (ref 0–11.9)
BUN SERPL-MCNC: 15 MG/DL (ref 8–22)
CALCIUM SERPL-MCNC: 8.6 MG/DL (ref 8.5–10.5)
CHLORIDE SERPL-SCNC: 108 MMOL/L (ref 96–112)
CO2 SERPL-SCNC: 27 MMOL/L (ref 20–33)
CREAT SERPL-MCNC: 0.52 MG/DL (ref 0.5–1.4)
GLUCOSE SERPL-MCNC: 99 MG/DL (ref 65–99)
MAGNESIUM SERPL-MCNC: 1.9 MG/DL (ref 1.5–2.5)
POTASSIUM SERPL-SCNC: 3.9 MMOL/L (ref 3.6–5.5)
SODIUM SERPL-SCNC: 140 MMOL/L (ref 135–145)

## 2018-08-09 PROCEDURE — 700102 HCHG RX REV CODE 250 W/ 637 OVERRIDE(OP): Performed by: INTERNAL MEDICINE

## 2018-08-09 PROCEDURE — 83735 ASSAY OF MAGNESIUM: CPT

## 2018-08-09 PROCEDURE — 80048 BASIC METABOLIC PNL TOTAL CA: CPT

## 2018-08-09 PROCEDURE — 99239 HOSP IP/OBS DSCHRG MGMT >30: CPT | Performed by: INTERNAL MEDICINE

## 2018-08-09 PROCEDURE — A9270 NON-COVERED ITEM OR SERVICE: HCPCS | Performed by: INTERNAL MEDICINE

## 2018-08-09 PROCEDURE — 36415 COLL VENOUS BLD VENIPUNCTURE: CPT

## 2018-08-09 RX ADMIN — LEVETIRACETAM 500 MG: 500 TABLET ORAL at 05:42

## 2018-08-09 RX ADMIN — POTASSIUM CHLORIDE 20 MEQ: 1500 TABLET, EXTENDED RELEASE ORAL at 06:00

## 2018-08-09 ASSESSMENT — PATIENT HEALTH QUESTIONNAIRE - PHQ9
1. LITTLE INTEREST OR PLEASURE IN DOING THINGS: NOT AT ALL
2. FEELING DOWN, DEPRESSED, IRRITABLE, OR HOPELESS: NOT AT ALL
SUM OF ALL RESPONSES TO PHQ9 QUESTIONS 1 AND 2: 0

## 2018-08-09 NOTE — DISCHARGE PLANNING
DILCIA LINO/LYLE for JEWELL Hester for Dr. Isaac, requesting call back regarding HH order to verify that Dr. Isaac will continue to follow patient and sign off for HH due to MD not seeing patient in past 6 months.

## 2018-08-09 NOTE — DISCHARGE SUMMARY
Discharge Summary    CHIEF COMPLAINT ON ADMISSION  Chief Complaint   Patient presents with   • Possible Stroke       Reason for Admission  Possible Stroke     Admission Date  7/31/2018    CODE STATUS  Full Code    HPI & HOSPITAL COURSE  This is a 68 y.o. female here with recent diagnosis of CVA status post TPA 5 weeks prior to this current admission with resolution of symptoms.  She was admitted on July 31, 2018 with altered level of consciousness and confusion.  She was noted to be significantly hyponatremic with sodium level of 117.  CT of the head did not reveal any acute infarction.  MRI was also negative for acute CVA.  CTA of the head did not show any occlusion or thrombosis.  She was seen by neurology during this admission with recommendation to follow-up EEG.    Initial EEG with paroxysmal events.  Repeat EEG again is noted to have an area of frontal cortical abnormality.  She was started on Keppra during this admission.  She is tolerating this well.  She has been on Xarelto for likely paroxysmal atrial fibrillation, this medication has been continued.  No signs of acute bleed noted on MRI.    For patient's significant hyponatremia, secondary to SIADH, she was started on hypertonic saline which has been discontinued.  She  was seen by nephrology and salt tabs have been discontinued.  With recommendation to continue salt tabs.  At this point patient's sodium levels are within normal limits    Patient is ambulatory with standby assist.  She is exhibiting periods of confusion.  She likely has underlying dementia.  She will require 24-hour assistance.  As per patient's  who is very attentive during this admission, family is able to provide.  Recommendation to patient and family is to obtain a medic alert bracelet, in the event that patient wanders off on her own.  Otherwise, the patient is easily reoriented.  She is oriented ×3 on discharge.  She will follow up with neurology for further recommendations  regarding antiepileptics.       Therefore, she is discharged in good and stable condition to home with organized home healthcare and close outpatient follow-up.    The patient met 2-midnight criteria for an inpatient stay at the time of discharge.    Discharge Date  August 9, 2018    FOLLOW UP ITEMS POST DISCHARGE  PCP/discharge clinic in 1 week  Neurology in 3-4 weeks    DISCHARGE DIAGNOSES  Principal Problem:    Acute metabolic encephalopathy due to severe hyponatremia, nonconvulsive seizure POA: Yes  Active Problems:    Severe symptomatic hyponatremia, likely SIADH POA: Yes    Urinary retention POA: Yes    Hypokalemia POA: Yes    Generalized non-convulsive seizures (HCC) POA: Yes    Anemia POA: Yes    Paroxysmal atrial fibrillation (HCC) POA: Yes    Acquired circulating anticoagulants (HCC) POA: Yes  Resolved Problems:    * No resolved hospital problems. *      FOLLOW UP  Future Appointments  Date Time Provider Department Center   8/27/2018 10:45 AM Stanton Jeffrey M.D. [facility] None   9/25/2018 1:20 PM [facility] None     Rachelle Isaac M.D.  [address] [address]  [phone]    Schedule an appointment as soon as possible for a visit in 1 week  Hospital follow-up appointment with PCP      MEDICATIONS ON DISCHARGE     Medication List      START taking these medications      Instructions   atorvastatin 40 MG Tabs  Commonly known as:  LIPITOR   Take 1 Tab by mouth every evening.  Dose:  40 mg     levETIRAcetam 500 MG Tabs  Commonly known as:  KEPPRA   Take 1 Tab by mouth 2 Times a Day.  Dose:  500 mg        CHANGE how you take these medications      Instructions   rivaroxaban 20 MG Tabs tablet  What changed:  · medication strength  · how much to take  Commonly known as:  XARELTO   Take 1 Tab by mouth with dinner.  Dose:  20 mg        CONTINUE taking these medications      Instructions   B COMPLEX 1 PO   Take  by mouth.     calcium citrate 950 MG Tabs  Commonly known as:   CALCITRATE   Take 950 mg by mouth every day.  Dose:  950 mg     fish oil 1000 MG Caps capsule   Take 1,000 mg by mouth every day.  Dose:  1000 mg     multivitamin Tabs   Take 1 Tab by mouth every day.  Dose:  1 Tab     OCUVITE PO   Take 1 Tab by mouth every day.  Dose:  1 Tab     Vitamin D (Cholecalciferol) 1000 units Caps   Take 1,000 Units by mouth every day.  Dose:  1000 Units     Zinc 50 MG Tabs   Take 50 mg by mouth every day.  Dose:  50 mg        STOP taking these medications    magnesium gluconate 500 MG tablet  Commonly known as:  MAG-G     metoprolol 25 MG Tabs  Commonly known as:  LOPRESSOR     Potassium Gluconate 595 (99 K) MG Tabs            Allergies  Allergies   Allergen Reactions   • Other Food      Corn   • Penicillins Rash     Minor RASH       DIET  Orders Placed This Encounter   Procedures   • Diet Order Regular     Standing Status:   Standing     Number of Occurrences:   1     Order Specific Question:   Diet:     Answer:   Regular [1]     Order Specific Question:   Texture/Fiber modifications:     Answer:   Dysphagia 2(Pureed/Chopped)specify fluid consistency(question 6) [2]     Order Specific Question:   Consistency/Fluid modifications:     Answer:   Nectar Thick [2]       ACTIVITY  As tolerated.  Weight bearing as tolerated    CONSULTATIONS  Neurology  Nephrology    PROCEDURES  EEG    LABORATORY  Lab Results   Component Value Date    SODIUM 140 08/09/2018    POTASSIUM 3.9 08/09/2018    CHLORIDE 108 08/09/2018    CO2 27 08/09/2018    GLUCOSE 99 08/09/2018    BUN 15 08/09/2018    CREATININE 0.52 08/09/2018        Lab Results   Component Value Date    WBC 11.0 (H) 08/02/2018    HEMOGLOBIN 12.3 08/02/2018    HEMATOCRIT 34.4 (L) 08/02/2018    PLATELETCT 246 08/02/2018        Total time of the discharge process exceeds 50 minutes.

## 2018-08-09 NOTE — DISCHARGE PLANNING
Agency/Facility Name: [facility]  Outcome: Patient accepted.

## 2018-08-09 NOTE — DISCHARGE PLANNING
ATTN: Case Management  RE: Referral for Home Health                We would like to take this opportunity to thank you for submitting a referral for your patient to continue care with [facility]. Our skilled team is dedicated to helping all patients recover and gain independence in the home setting.            As of 8/9/18, we have accepted the above patient into our service. A [facility] clinician will be out to see the patient within 48 hours to conduct our initial visit. If you have any questions or concerns regarding the patient’s transition to Home Health, please do not hesitate to contact us. We are open for referrals 7 days a week from 8AM to 5PM at [phone].      We look forward to collaborating with you,  [facility] Team

## 2018-08-09 NOTE — DISCHARGE INSTRUCTIONS
Discharge Instructions    Discharged to home by car with relative. Discharged via walking, hospital escort: Refused.  Special equipment needed: Not Applicable    Be sure to schedule a follow-up appointment with your primary care doctor or any specialists as instructed.     Discharge Plan:   Diet Plan: Discussed  Activity Level: Discussed  Confirmed Follow up Appointment: Appointment Scheduled  Confirmed Symptoms Management: Discussed  Medication Reconciliation Updated: Yes    I understand that a diet low in cholesterol, fat, and sodium is recommended for good health. Unless I have been given specific instructions below for another diet, I accept this instruction as my diet prescription.   Other diet: Full Liquid diet Nectar thick    Special Instructions:     Stroke/CVA/TIA/Hemorrhagic Ischemia Discharge Instructions  You have had a stroke. Your risk factors have been identified as follows:  Age - Over 55  It is important that you reduce your risk factors to avoid another stroke in the future. Here are some general guidelines to follow:  · Eat healthy - avoid food high in fat.  · Get regular exercise.  · Maintain a healthy weight.  · Avoid smoking.  · Avoid alcohol and illegal drug use.  · Take your medications as directed.  For more information regarding risk factors, refer to pages 17-19 in your Stroke Patient Education Guide. Stroke Education Guide was given to patient and family member.    Warning signs of a stroke include (which can also be found on page 3 of your Stroke Patient Education Guide):  · Sudden numbness of weakness of the face, arm or leg (especially on one side of the body).  · Sudden confusion, trouble speaking or understanding.  · Sudden trouble seeing in one or both eyes.  · Sudden trouble walking, dizziness, loss of balance or coordination.  · Sudden severe headache with no known cause.  It is very important to get treatment quickly when a stroke occurs. If you experience any of the above  warning signs, call [phone] immediately.     Some patients who have had a stroke will be going home on a blood thinner medication called Warfarin (Coumadin).  This medication requires very close monitoring and follow up.  This follow up can be provided by either your Primary Care Physician or by [facility] Outpatient Anticoagulation Service.  The Outpatient Anticoagulation Service is located at the [facility] at [facility]  If you do not know when your follow up appointment is scheduled, call [phone] to verify your appointment time.      · Is patient discharged on Warfarin / Coumadin?   No     Depression / Suicide Risk    As you are discharged from this [facility], it is important to learn how to keep safe from harming yourself.    Recognize the warning signs:  · Abrupt changes in personality, positive or negative- including increase in energy   · Giving away possessions  · Change in eating patterns- significant weight changes-  positive or negative  · Change in sleeping patterns- unable to sleep or sleeping all the time   · Unwillingness or inability to communicate  · Depression  · Unusual sadness, discouragement and loneliness  · Talk of wanting to die  · Neglect of personal appearance   · Rebelliousness- reckless behavior  · Withdrawal from people/activities they love  · Confusion- inability to concentrate     If you or a loved one observes any of these behaviors or has concerns about self-harm, here's what you can do:  · Talk about it- your feelings and reasons for harming yourself  · Remove any means that you might use to hurt yourself (examples: pills, rope, extension cords, firearm)  · Get professional help from the community (Mental Health, Substance Abuse, psychological counseling)  · Do not be alone:Call your Safe Contact- someone whom you trust who will be there for you.  · Call your local CRISIS HOTLINE 476-6638 or  498-178-0309  · Call your local Children's Mobile Crisis Response Team [place] (012) 328-8908 or www.[employer]  · Call the toll free National Suicide Prevention Hotlines   · National Suicide Prevention Lifeline 993-762-PFUX (0338)  · National Form[employer] Line Network 800-SUICIDE (305-4957)

## 2018-08-09 NOTE — PROGRESS NOTES
Patient remains disoriented to time and situation. Wander guard is present on left ankle. I have ambulated her multiple times today and it is functioning well.

## 2018-08-09 NOTE — DISCHARGE PLANNING
Anticipated Discharge Disposition: Home with HH    Action: LSW tc to [facility] to check on patient's acceptance to HH.  LSW spoke to Alexia, who stated that patient PCP, Dr. Poncho MD has not called [facility] office back to confirm that they will sign order and continue to follow patient.  LSW to call office at [phone]    Barriers to Discharge: HH acceptance    Plan: LSW to continue to assist with d/c as needed.

## 2018-08-10 ENCOUNTER — HOSPITAL ENCOUNTER (OUTPATIENT)
Facility: MEDICAL CENTER | Age: 68
End: 2018-08-10
Attending: NURSE PRACTITIONER
Payer: MEDICARE

## 2018-08-10 ENCOUNTER — HOME CARE VISIT (OUTPATIENT)
Dept: HOME HEALTH SERVICES | Facility: HOME HEALTHCARE | Age: 68
End: 2018-08-10
Payer: MEDICARE

## 2018-08-10 ENCOUNTER — PATIENT OUTREACH (OUTPATIENT)
Dept: HEALTH INFORMATION MANAGEMENT | Facility: OTHER | Age: 68
End: 2018-08-10

## 2018-08-10 ENCOUNTER — TELEPHONE (OUTPATIENT)
Dept: HEALTH INFORMATION MANAGEMENT | Facility: OTHER | Age: 68
End: 2018-08-10

## 2018-08-10 ENCOUNTER — OFFICE VISIT (OUTPATIENT)
Dept: URGENT CARE | Facility: CLINIC | Age: 68
End: 2018-08-10
Payer: MEDICARE

## 2018-08-10 VITALS
HEIGHT: 63 IN | DIASTOLIC BLOOD PRESSURE: 78 MMHG | HEART RATE: 88 BPM | RESPIRATION RATE: 16 BRPM | SYSTOLIC BLOOD PRESSURE: 128 MMHG | OXYGEN SATURATION: 96 % | TEMPERATURE: 99.4 F | BODY MASS INDEX: 22.86 KG/M2 | WEIGHT: 129 LBS

## 2018-08-10 DIAGNOSIS — R35.0 URINARY FREQUENCY: ICD-10-CM

## 2018-08-10 DIAGNOSIS — N12 PYELONEPHRITIS: ICD-10-CM

## 2018-08-10 LAB
APPEARANCE UR: NORMAL
BILIRUB UR STRIP-MCNC: NEGATIVE MG/DL
COLOR UR AUTO: YELLOW
GLUCOSE UR STRIP.AUTO-MCNC: NEGATIVE MG/DL
KETONES UR STRIP.AUTO-MCNC: NEGATIVE MG/DL
LEUKOCYTE ESTERASE UR QL STRIP.AUTO: NORMAL
NITRITE UR QL STRIP.AUTO: POSITIVE
PH UR STRIP.AUTO: 5.5 [PH] (ref 5–8)
PROT UR QL STRIP: NORMAL MG/DL
RBC UR QL AUTO: NORMAL
SP GR UR STRIP.AUTO: 1.01
UROBILINOGEN UR STRIP-MCNC: 0.2 MG/DL

## 2018-08-10 PROCEDURE — G0493 RN CARE EA 15 MIN HH/HOSPICE: HCPCS

## 2018-08-10 PROCEDURE — 665001 SOC-HOME HEALTH

## 2018-08-10 PROCEDURE — 665998 HH PPS REVENUE CREDIT

## 2018-08-10 PROCEDURE — 87086 URINE CULTURE/COLONY COUNT: CPT

## 2018-08-10 PROCEDURE — 87077 CULTURE AEROBIC IDENTIFY: CPT | Mod: 91

## 2018-08-10 PROCEDURE — 87186 SC STD MICRODIL/AGAR DIL: CPT

## 2018-08-10 PROCEDURE — 99214 OFFICE O/P EST MOD 30 MIN: CPT | Performed by: NURSE PRACTITIONER

## 2018-08-10 PROCEDURE — 665999 HH PPS REVENUE DEBIT

## 2018-08-10 PROCEDURE — 81002 URINALYSIS NONAUTO W/O SCOPE: CPT | Performed by: NURSE PRACTITIONER

## 2018-08-10 RX ORDER — CIPROFLOXACIN 500 MG/1
500 TABLET, FILM COATED ORAL 2 TIMES DAILY
Qty: 20 TAB | Refills: 0 | Status: SHIPPED | OUTPATIENT
Start: 2018-08-10 | End: 2018-11-20

## 2018-08-10 SDOH — ECONOMIC STABILITY: HOUSING INSECURITY: HOME SAFETY: PT HAS MULTIPLE RUGS, NO GRAB BARS OR SHOWER CHAIR THAT CREATES A POTENTIAL RISK AS A TRIP/SLIP HAZARD.

## 2018-08-10 SDOH — ECONOMIC STABILITY: HOUSING INSECURITY: UNSAFE APPLIANCES: 0

## 2018-08-10 SDOH — ECONOMIC STABILITY: HOUSING INSECURITY: UNSAFE COOKING RANGE AREA: 0

## 2018-08-10 ASSESSMENT — ENCOUNTER SYMPTOMS
FEVER: 1
ABDOMINAL PAIN: 0
DIZZINESS: 0
NAUSEA: DENIES
BACK PAIN: 1
SHORTNESS OF BREATH: T
FLANK PAIN: 1
HEADACHES: 0
VOMITING: 0
DIARRHEA: 0
NAUSEA: 0
CHILLS: 1
ORTHOPNEA: 0
VOMITING: DENIES

## 2018-08-10 ASSESSMENT — PATIENT HEALTH QUESTIONNAIRE - PHQ9
1. LITTLE INTEREST OR PLEASURE IN DOING THINGS: 00
2. FEELING DOWN, DEPRESSED, IRRITABLE, OR HOPELESS: 00

## 2018-08-10 ASSESSMENT — ACTIVITIES OF DAILY LIVING (ADL)
OASIS_M1830: 00
HOME_HEALTH_OASIS: 00

## 2018-08-10 NOTE — PROGRESS NOTES
Subjective:      Azul Milan is a 68 y.o. female who presents with Urinary Frequency (hesitant and burning urination, fever, baseline confusion)            HPI New problem. 68 year old female with urinary frequency, fever, and burning with urination. She had home health visit today and they noticed fever. She has had some confusion per  and flank pain last night/yesterday. No vomiting or abdominal pain. She was recently hospitalized with catheter in place.  Other food and Penicillins  Current Outpatient Prescriptions on File Prior to Visit   Medication Sig Dispense Refill   • rivaroxaban (XARELTO) 20 MG Tab tablet Take 1 Tab by mouth with dinner. 30 Tab 2   • levETIRAcetam (KEPPRA) 500 MG Tab Take 1 Tab by mouth 2 Times a Day. 60 Tab 2   • atorvastatin (LIPITOR) 40 MG Tab Take 1 Tab by mouth every evening. 30 Tab 2   • multivitamin (THERAGRAN) Tab Take 1 Tab by mouth every day.     • B Complex Vitamins (B COMPLEX 1 PO) Take 1 Cap by mouth every day at 6 PM.     • Multiple Vitamins-Minerals (OCUVITE PO) Take 1 Tab by mouth every day.     • Omega-3 Fatty Acids (FISH OIL) 1000 MG Cap capsule Take 1,000 mg by mouth every day.     • calcium citrate (CALCITRATE) 950 MG Tab Take 950 mg by mouth every day.     • Zinc 50 MG Tab Take 50 mg by mouth every day.     • Vitamin D, Cholecalciferol, 1000 UNITS Cap Take 1,000 Units by mouth every day.       No current facility-administered medications on file prior to visit.      Social History     Social History   • Marital status:      Spouse name: N/A   • Number of children: N/A   • Years of education: N/A     Occupational History   • Not on file.     Social History Main Topics   • Smoking status: Never Smoker   • Smokeless tobacco: Never Used   • Alcohol use No   • Drug use: No   • Sexual activity: Yes     Partners: Male     Other Topics Concern   • Not on file     Social History Narrative   • No narrative on file     family history includes Cancer  "in her sister; Dementia in her mother; Lung Disease in her paternal grandfather; Other in her brother.      Review of Systems   Constitutional: Positive for chills and fever.   Cardiovascular: Negative for chest pain and orthopnea.   Gastrointestinal: Negative for abdominal pain, diarrhea, nausea and vomiting.   Genitourinary: Positive for dysuria, flank pain, frequency and urgency. Negative for hematuria.   Musculoskeletal: Positive for back pain.   Neurological: Negative for dizziness and headaches.          Objective:     /78   Pulse 88   Temp 37.4 °C (99.4 °F)   Resp 16   Ht 1.6 m (5' 2.99\")   Wt 58.5 kg (129 lb)   SpO2 96%   BMI 22.86 kg/m²      Physical Exam   Constitutional: She is oriented to person, place, and time. She appears well-developed and well-nourished. No distress.   Cardiovascular: Normal rate, regular rhythm and normal heart sounds.    No murmur heard.  Pulmonary/Chest: Effort normal and breath sounds normal. No respiratory distress.   Abdominal: Soft. Bowel sounds are normal. There is tenderness in the suprapubic area. There is CVA tenderness.   Musculoskeletal: Normal range of motion.   Moves all 4 extremities normally   Neurological: She is alert and oriented to person, place, and time.   Skin: Skin is warm and dry.   Psychiatric: She has a normal mood and affect. Her behavior is normal. Thought content normal.   Nursing note and vitals reviewed.              Assessment/Plan:     1. Pyelonephritis     2. Urinary frequency  POCT Urinalysis    URINE CULTURE(NEW)    cefTRIAXone (ROCEPHIN) 1 g, lidocaine (XYLOCAINE) 1 % 3.6 mL for IM use    ciprofloxacin (CIPRO) 500 MG Tab     Labs done yesterday, reassuring electrolytes.  states patient eating and drinking well.  Rocephin here in clinic-Outpt cipro x 10days.  Fluids.  Differential diagnosis, natural history, supportive care, and indications for immediate follow-up discussed at length.     "

## 2018-08-10 NOTE — TELEPHONE ENCOUNTER
Received referral from [facility]. Medications reviewed. Interaction noted between ciprofloxacin and calcitrate/MVI with recommendation to administer ciprofloxacin at least 2 hours before or 6 hours after calcitrate/ MVI. Outbound call to patient. Informed both patient and her  Christopher of the above. Patient and spouse verbalize understanding.     Sherrie Khan, PharmD

## 2018-08-11 PROCEDURE — 665998 HH PPS REVENUE CREDIT

## 2018-08-11 PROCEDURE — 665999 HH PPS REVENUE DEBIT

## 2018-08-12 VITALS
TEMPERATURE: 100.3 F | SYSTOLIC BLOOD PRESSURE: 118 MMHG | WEIGHT: 123.8 LBS | RESPIRATION RATE: 77 BRPM | HEART RATE: 77 BPM | HEIGHT: 66 IN | OXYGEN SATURATION: 98 % | DIASTOLIC BLOOD PRESSURE: 72 MMHG | BODY MASS INDEX: 19.89 KG/M2

## 2018-08-12 LAB
BACTERIA UR CULT: ABNORMAL
BACTERIA UR CULT: ABNORMAL
SIGNIFICANT IND 70042: ABNORMAL
SITE SITE: ABNORMAL
SOURCE SOURCE: ABNORMAL

## 2018-08-12 PROCEDURE — 665999 HH PPS REVENUE DEBIT

## 2018-08-12 PROCEDURE — 665998 HH PPS REVENUE CREDIT

## 2018-08-12 ASSESSMENT — ENCOUNTER SYMPTOMS: DIFFICULTY THINKING: 1

## 2018-08-13 ENCOUNTER — HOME CARE VISIT (OUTPATIENT)
Dept: HOME HEALTH SERVICES | Facility: HOME HEALTHCARE | Age: 68
End: 2018-08-13

## 2018-08-13 ENCOUNTER — HOME CARE VISIT (OUTPATIENT)
Dept: HOME HEALTH SERVICES | Facility: HOME HEALTHCARE | Age: 68
End: 2018-08-13
Payer: MEDICARE

## 2018-08-13 VITALS
DIASTOLIC BLOOD PRESSURE: 66 MMHG | TEMPERATURE: 100.5 F | SYSTOLIC BLOOD PRESSURE: 102 MMHG | OXYGEN SATURATION: 98 % | HEART RATE: 87 BPM | RESPIRATION RATE: 16 BRPM

## 2018-08-13 PROCEDURE — 665998 HH PPS REVENUE CREDIT

## 2018-08-13 PROCEDURE — G0493 RN CARE EA 15 MIN HH/HOSPICE: HCPCS

## 2018-08-13 PROCEDURE — 665999 HH PPS REVENUE DEBIT

## 2018-08-13 ASSESSMENT — ENCOUNTER SYMPTOMS
VOMITING: NO ISSUES AT THE TIME OF THIS ASSESSMENT.
NAUSEA: NO ISSUES AT THE TIME OF THIS ASSESSMENT.
RESPIRATORY SYMPTOMS COMMENTS: NO ISSUES AT THE TIME OF THIS ASSESSMENT.

## 2018-08-13 ASSESSMENT — ACTIVITIES OF DAILY LIVING (ADL): TRANSPORTATION COMMENTS: REQUIRES ASSIST OF ONE OTHER PERSON TO LEAVE THE HOME

## 2018-08-14 ENCOUNTER — HOME CARE VISIT (OUTPATIENT)
Dept: HOME HEALTH SERVICES | Facility: HOME HEALTHCARE | Age: 68
End: 2018-08-14
Payer: MEDICARE

## 2018-08-14 PROCEDURE — 665998 HH PPS REVENUE CREDIT

## 2018-08-14 PROCEDURE — 665999 HH PPS REVENUE DEBIT

## 2018-08-14 ASSESSMENT — ACTIVITIES OF DAILY LIVING (ADL): HOME_HEALTH_OASIS: 01

## 2018-08-15 ENCOUNTER — OFFICE VISIT (OUTPATIENT)
Dept: MEDICAL GROUP | Facility: PHYSICIAN GROUP | Age: 68
End: 2018-08-15
Payer: MEDICARE

## 2018-08-15 ENCOUNTER — HOME CARE VISIT (OUTPATIENT)
Dept: HOME HEALTH SERVICES | Facility: HOME HEALTHCARE | Age: 68
End: 2018-08-15
Payer: MEDICARE

## 2018-08-15 VITALS
TEMPERATURE: 99 F | OXYGEN SATURATION: 98 % | RESPIRATION RATE: 17 BRPM | DIASTOLIC BLOOD PRESSURE: 63 MMHG | HEART RATE: 81 BPM | SYSTOLIC BLOOD PRESSURE: 117 MMHG

## 2018-08-15 VITALS
BODY MASS INDEX: 21.97 KG/M2 | DIASTOLIC BLOOD PRESSURE: 68 MMHG | RESPIRATION RATE: 14 BRPM | OXYGEN SATURATION: 98 % | HEIGHT: 63 IN | SYSTOLIC BLOOD PRESSURE: 98 MMHG | TEMPERATURE: 99.3 F | HEART RATE: 83 BPM | WEIGHT: 124 LBS

## 2018-08-15 DIAGNOSIS — E87.1 HYPONATREMIA: ICD-10-CM

## 2018-08-15 DIAGNOSIS — I48.91 ATRIAL FIBRILLATION, UNSPECIFIED TYPE (HCC): ICD-10-CM

## 2018-08-15 DIAGNOSIS — N12 PYELONEPHRITIS: ICD-10-CM

## 2018-08-15 DIAGNOSIS — I63.9 CEREBROVASCULAR ACCIDENT (CVA), UNSPECIFIED MECHANISM (HCC): ICD-10-CM

## 2018-08-15 DIAGNOSIS — R41.89 COGNITIVE DEFICITS: ICD-10-CM

## 2018-08-15 DIAGNOSIS — G93.40 ENCEPHALOPATHY: ICD-10-CM

## 2018-08-15 DIAGNOSIS — D68.318 ACQUIRED CIRCULATING ANTICOAGULANTS (HCC): ICD-10-CM

## 2018-08-15 PROCEDURE — 665999 HH PPS REVENUE DEBIT

## 2018-08-15 PROCEDURE — G0151 HHCP-SERV OF PT,EA 15 MIN: HCPCS

## 2018-08-15 PROCEDURE — G0180 MD CERTIFICATION HHA PATIENT: HCPCS | Performed by: FAMILY MEDICINE

## 2018-08-15 PROCEDURE — 665998 HH PPS REVENUE CREDIT

## 2018-08-15 PROCEDURE — 99214 OFFICE O/P EST MOD 30 MIN: CPT | Performed by: FAMILY MEDICINE

## 2018-08-15 SDOH — ECONOMIC STABILITY: HOUSING INSECURITY: HOME SAFETY: RECOMMEND SHOWER RAIL FOR SAFETY, NONSKID MAT AS PATIENT TENDS TO MOVE QUICKLY AROUND SHOWER DOORS

## 2018-08-15 ASSESSMENT — ENCOUNTER SYMPTOMS: DIFFICULTY THINKING: 1

## 2018-08-16 ENCOUNTER — HOSPITAL ENCOUNTER (OUTPATIENT)
Facility: MEDICAL CENTER | Age: 68
End: 2018-08-16
Attending: FAMILY MEDICINE
Payer: MEDICARE

## 2018-08-16 PROCEDURE — 665999 HH PPS REVENUE DEBIT

## 2018-08-16 PROCEDURE — 665998 HH PPS REVENUE CREDIT

## 2018-08-16 NOTE — PROGRESS NOTES
Chief Complaint   Patient presents with   • Hospital Follow-up     Altered mental status        HISTORY OF PRESENT ILLNESS: Patient is a 68 y.o. female established patient here today for the following concerns:      This is a pleasant 68 year old female here with her  for hospital follow up.  In the last few months developed ischemic assault to the brain (TIA vs early CVA) in which thrombolytics were given.  She was started empirically on anticoagulation for concern of possible occult PAF.  Nearly 1 month later she developed altered mental status and there was concern for an epileptic event.  She was started on keppra and has not had further event.  At that time was also found to have hyponatremia.  She was repleted and discharged on salt tablets.  These were discontinued a couple weeks ago and last check on her electrolytes were normal.  Lastly, she was found to have UTI/Pyelo with severe kidney pain and was given shot of rocephin and oral antibiotics.  She completed these without any further back pain or dysuria.  She reports she is struggling with some cognitive deficits in recall and her  states she may have some dementia now.  Due to her great physical conditioning she was declined for PT, but continues to avidly participate in much of her recreational activities.      Past Medical, Social, and Family history reviewed and updated in EPIC    Allergies:Other food and Penicillins    Current Outpatient Prescriptions   Medication Sig Dispense Refill   • CALCIUM/MAGNESIUM/ZINC FORMULA PO Take 1 Tab by mouth every day.     • ciprofloxacin (CIPRO) 500 MG Tab Take 1 Tab by mouth 2 times a day. 20 Tab 0   • rivaroxaban (XARELTO) 20 MG Tab tablet Take 1 Tab by mouth with dinner. 30 Tab 2   • levETIRAcetam (KEPPRA) 500 MG Tab Take 1 Tab by mouth 2 Times a Day. 60 Tab 2   • atorvastatin (LIPITOR) 40 MG Tab Take 1 Tab by mouth every evening. 30 Tab 2   • multivitamin (THERAGRAN) Tab Take 1 Tab by mouth every  "day.     • B Complex Vitamins (B COMPLEX 1 PO) Take 1 Cap by mouth every day at 6 PM.     • Multiple Vitamins-Minerals (OCUVITE PO) Take 1 Tab by mouth every day.     • Omega-3 Fatty Acids (FISH OIL) 1000 MG Cap capsule Take 1,000 mg by mouth every day.     • Zinc 50 MG Tab Take 50 mg by mouth every day.     • Vitamin D, Cholecalciferol, 1000 UNITS Cap Take 1,000 Units by mouth every day.       No current facility-administered medications for this visit.          ROS:  Review of Systems   Constitutional: Negative for fever, chills, weight loss and malaise/fatigue.   HENT: Negative for ear pain, nosebleeds, congestion, sore throat and neck pain.    Eyes: Negative for blurred vision.   Respiratory: Negative for cough, sputum production, shortness of breath and wheezing.    Cardiovascular: Negative for chest pain, palpitations,  and leg swelling.   Gastrointestinal: Negative for heartburn, nausea, vomiting, diarrhea and abdominal pain.   Genitourinary: Negative for dysuria, urgency and frequency.   Musculoskeletal: Negative for myalgias, back pain and joint pain.   Skin: Negative for rash and itching.   Neurological: Negative for dizziness, tingling, tremors, sensory change, focal weakness and headaches.   Endo/Heme/Allergies: Does not bruise/bleed easily.   Psychiatric/Behavioral: Negative for depression, anxiety, suicidal ideas, insomnia and memory loss.      Exam:  Blood pressure (!) 98/68, pulse 83, temperature 37.4 °C (99.3 °F), resp. rate 14, height 1.6 m (5' 2.99\"), weight 56.2 kg (124 lb), SpO2 98 %.    General:  Well nourished, well developed in NAD  Head is grossly normal.  Neck: Supple without JVD   Pulmonary:  Normal effort.   Cardiovascular: Regular rate  Extremities: no clubbing, cyanosis, or edema.  Psych: affect appropriate      Please note that this dictation was created using voice recognition software. I have made every reasonable attempt to correct obvious errors, but I expect that there are errors " of grammar and possibly content that I did not discover before finalizing the note.    Assessment/Plan:  1. Acute metabolic encephalopathy due to severe hyponatremia, nonconvulsive seizure  - CBC WITH DIFFERENTIAL; Future    2. Severe symptomatic hyponatremia, likely SIADH  - BASIC METABOLIC PANEL; Future    3. Cognitive deficits  Discussed that these often take time to recover from stroke plus the Keppra can cause sedation and slowing.     4. Atrial fibrillation, unspecified type (HCC)  Continue xarelot  - CBC WITH DIFFERENTIAL; Future    5. Acquired circulating anticoagulants (HCC)  As mentioned above.   - CBC WITH DIFFERENTIAL; Future    6. Cerebrovascular accident (CVA), unspecified mechanism (HCC)  Noted.   - CBC WITH DIFFERENTIAL; Future    7. Pyelonephritis  Will recheck UA.   - URINALYSIS,CULTURE IF INDICATED; Future  - CBC WITH DIFFERENTIAL; Future    Discussed avoiding cycling right now, may kayak with lifevest.  No unsupervised swimming or bathes.   Follow up with neurology and cardiology as planned.  Follow up with us in 2 months.

## 2018-08-17 ENCOUNTER — HOME CARE VISIT (OUTPATIENT)
Dept: HOME HEALTH SERVICES | Facility: HOME HEALTHCARE | Age: 68
End: 2018-08-17
Payer: MEDICARE

## 2018-08-17 ENCOUNTER — HOSPITAL ENCOUNTER (OUTPATIENT)
Facility: MEDICAL CENTER | Age: 68
End: 2018-08-17
Attending: FAMILY MEDICINE
Payer: MEDICARE

## 2018-08-17 VITALS
TEMPERATURE: 100.3 F | OXYGEN SATURATION: 98 % | HEART RATE: 87 BPM | SYSTOLIC BLOOD PRESSURE: 104 MMHG | RESPIRATION RATE: 16 BRPM | DIASTOLIC BLOOD PRESSURE: 62 MMHG

## 2018-08-17 LAB
ANION GAP SERPL CALC-SCNC: 11 MMOL/L (ref 0–11.9)
APPEARANCE UR: CLEAR
BASOPHILS # BLD AUTO: 1 % (ref 0–1.8)
BASOPHILS # BLD: 0.07 K/UL (ref 0–0.12)
BILIRUB UR QL STRIP.AUTO: NEGATIVE
BUN SERPL-MCNC: 13 MG/DL (ref 8–22)
CALCIUM SERPL-MCNC: 8.9 MG/DL (ref 8.5–10.5)
CHLORIDE SERPL-SCNC: 107 MMOL/L (ref 96–112)
CO2 SERPL-SCNC: 22 MMOL/L (ref 20–33)
COLOR UR: YELLOW
CREAT SERPL-MCNC: 0.63 MG/DL (ref 0.5–1.4)
EOSINOPHIL # BLD AUTO: 0.07 K/UL (ref 0–0.51)
EOSINOPHIL NFR BLD: 1 % (ref 0–6.9)
ERYTHROCYTE [DISTWIDTH] IN BLOOD BY AUTOMATED COUNT: 48.8 FL (ref 35.9–50)
GLUCOSE SERPL-MCNC: 83 MG/DL (ref 65–99)
GLUCOSE UR STRIP.AUTO-MCNC: NEGATIVE MG/DL
HCT VFR BLD AUTO: 37.7 % (ref 37–47)
HGB BLD-MCNC: 11.9 G/DL (ref 12–16)
IMM GRANULOCYTES # BLD AUTO: 0.02 K/UL (ref 0–0.11)
IMM GRANULOCYTES NFR BLD AUTO: 0.3 % (ref 0–0.9)
KETONES UR STRIP.AUTO-MCNC: NEGATIVE MG/DL
LEUKOCYTE ESTERASE UR QL STRIP.AUTO: NEGATIVE
LYMPHOCYTES # BLD AUTO: 1.46 K/UL (ref 1–4.8)
LYMPHOCYTES NFR BLD: 21.8 % (ref 22–41)
MCH RBC QN AUTO: 31.5 PG (ref 27–33)
MCHC RBC AUTO-ENTMCNC: 31.6 G/DL (ref 33.6–35)
MCV RBC AUTO: 99.7 FL (ref 81.4–97.8)
MICRO URNS: NORMAL
MONOCYTES # BLD AUTO: 0.48 K/UL (ref 0–0.85)
MONOCYTES NFR BLD AUTO: 7.2 % (ref 0–13.4)
NEUTROPHILS # BLD AUTO: 4.61 K/UL (ref 2–7.15)
NEUTROPHILS NFR BLD: 68.7 % (ref 44–72)
NITRITE UR QL STRIP.AUTO: NEGATIVE
NRBC # BLD AUTO: 0 K/UL
NRBC BLD-RTO: 0 /100 WBC
PH UR STRIP.AUTO: 6 [PH]
PLATELET # BLD AUTO: 455 K/UL (ref 164–446)
PMV BLD AUTO: 10.1 FL (ref 9–12.9)
POTASSIUM SERPL-SCNC: 3.9 MMOL/L (ref 3.6–5.5)
PROT UR QL STRIP: NEGATIVE MG/DL
RBC # BLD AUTO: 3.78 M/UL (ref 4.2–5.4)
RBC UR QL AUTO: NEGATIVE
SODIUM SERPL-SCNC: 140 MMOL/L (ref 135–145)
SP GR UR STRIP.AUTO: 1.01
UROBILINOGEN UR STRIP.AUTO-MCNC: 0.2 MG/DL
WBC # BLD AUTO: 6.7 K/UL (ref 4.8–10.8)

## 2018-08-17 PROCEDURE — 80048 BASIC METABOLIC PNL TOTAL CA: CPT

## 2018-08-17 PROCEDURE — 665999 HH PPS REVENUE DEBIT

## 2018-08-17 PROCEDURE — 85025 COMPLETE CBC W/AUTO DIFF WBC: CPT

## 2018-08-17 PROCEDURE — 81003 URINALYSIS AUTO W/O SCOPE: CPT

## 2018-08-17 PROCEDURE — 665998 HH PPS REVENUE CREDIT

## 2018-08-17 PROCEDURE — G0153 HHCP-SVS OF S/L PATH,EA 15MN: HCPCS

## 2018-08-17 PROCEDURE — G0299 HHS/HOSPICE OF RN EA 15 MIN: HCPCS

## 2018-08-17 ASSESSMENT — ENCOUNTER SYMPTOMS
DIFFICULTY THINKING: 1
RESPIRATORY SYMPTOMS COMMENTS: NO ISSUES AT THE TIME OF THIS ASSESSMENT.
VOMITING: NO ISSUES AT THE TIME OF THIS ASSESSMENT.
NAUSEA: NO ISSUES AT THE TIME OF THIS ASSESSMENT.

## 2018-08-18 PROCEDURE — 665998 HH PPS REVENUE CREDIT

## 2018-08-18 PROCEDURE — 665999 HH PPS REVENUE DEBIT

## 2018-08-19 PROCEDURE — 665999 HH PPS REVENUE DEBIT

## 2018-08-19 PROCEDURE — 665998 HH PPS REVENUE CREDIT

## 2018-08-20 PROCEDURE — 665999 HH PPS REVENUE DEBIT

## 2018-08-20 PROCEDURE — 665998 HH PPS REVENUE CREDIT

## 2018-08-21 ENCOUNTER — HOME CARE VISIT (OUTPATIENT)
Dept: HOME HEALTH SERVICES | Facility: HOME HEALTHCARE | Age: 68
End: 2018-08-21
Payer: MEDICARE

## 2018-08-21 VITALS
HEART RATE: 85 BPM | RESPIRATION RATE: 16 BRPM | TEMPERATURE: 99.7 F | SYSTOLIC BLOOD PRESSURE: 134 MMHG | OXYGEN SATURATION: 97 % | DIASTOLIC BLOOD PRESSURE: 82 MMHG

## 2018-08-21 PROCEDURE — 665998 HH PPS REVENUE CREDIT

## 2018-08-21 PROCEDURE — G0493 RN CARE EA 15 MIN HH/HOSPICE: HCPCS

## 2018-08-21 PROCEDURE — 665999 HH PPS REVENUE DEBIT

## 2018-08-21 PROCEDURE — G0153 HHCP-SVS OF S/L PATH,EA 15MN: HCPCS

## 2018-08-21 SDOH — ECONOMIC STABILITY: HOUSING INSECURITY: UNSAFE COOKING RANGE AREA: 0

## 2018-08-21 SDOH — ECONOMIC STABILITY: HOUSING INSECURITY: UNSAFE APPLIANCES: 0

## 2018-08-21 ASSESSMENT — ENCOUNTER SYMPTOMS
NAUSEA: PATIENT VERBALIZES NO NAUSEA.
VOMITING: PATIENT VERBALIZES NO EMESIS.

## 2018-08-22 VITALS
TEMPERATURE: 98.9 F | RESPIRATION RATE: 16 BRPM | DIASTOLIC BLOOD PRESSURE: 84 MMHG | HEART RATE: 85 BPM | SYSTOLIC BLOOD PRESSURE: 132 MMHG

## 2018-08-22 PROCEDURE — 665999 HH PPS REVENUE DEBIT

## 2018-08-22 PROCEDURE — 665998 HH PPS REVENUE CREDIT

## 2018-08-23 ENCOUNTER — HOME CARE VISIT (OUTPATIENT)
Dept: HOME HEALTH SERVICES | Facility: HOME HEALTHCARE | Age: 68
End: 2018-08-23
Payer: MEDICARE

## 2018-08-23 PROCEDURE — 665998 HH PPS REVENUE CREDIT

## 2018-08-23 PROCEDURE — 665999 HH PPS REVENUE DEBIT

## 2018-08-23 PROCEDURE — G0153 HHCP-SVS OF S/L PATH,EA 15MN: HCPCS

## 2018-08-24 ENCOUNTER — HOME CARE VISIT (OUTPATIENT)
Dept: HOME HEALTH SERVICES | Facility: HOME HEALTHCARE | Age: 68
End: 2018-08-24
Payer: MEDICARE

## 2018-08-24 ENCOUNTER — HOSPITAL ENCOUNTER (OUTPATIENT)
Facility: MEDICAL CENTER | Age: 68
End: 2018-08-24
Attending: FAMILY MEDICINE
Payer: MEDICARE

## 2018-08-24 VITALS
DIASTOLIC BLOOD PRESSURE: 84 MMHG | HEART RATE: 73 BPM | TEMPERATURE: 99.3 F | SYSTOLIC BLOOD PRESSURE: 128 MMHG | RESPIRATION RATE: 16 BRPM

## 2018-08-24 VITALS
RESPIRATION RATE: 16 BRPM | DIASTOLIC BLOOD PRESSURE: 80 MMHG | HEART RATE: 86 BPM | OXYGEN SATURATION: 98 % | SYSTOLIC BLOOD PRESSURE: 136 MMHG | TEMPERATURE: 99.1 F

## 2018-08-24 LAB
ANION GAP SERPL CALC-SCNC: 7 MMOL/L (ref 0–11.9)
BUN SERPL-MCNC: 10 MG/DL (ref 8–22)
CALCIUM SERPL-MCNC: 9 MG/DL (ref 8.5–10.5)
CHLORIDE SERPL-SCNC: 106 MMOL/L (ref 96–112)
CO2 SERPL-SCNC: 27 MMOL/L (ref 20–33)
CREAT SERPL-MCNC: 0.68 MG/DL (ref 0.5–1.4)
GLUCOSE SERPL-MCNC: 133 MG/DL (ref 65–99)
POTASSIUM SERPL-SCNC: 3.8 MMOL/L (ref 3.6–5.5)
SODIUM SERPL-SCNC: 140 MMOL/L (ref 135–145)

## 2018-08-24 PROCEDURE — 80048 BASIC METABOLIC PNL TOTAL CA: CPT

## 2018-08-24 PROCEDURE — 665998 HH PPS REVENUE CREDIT

## 2018-08-24 PROCEDURE — G0299 HHS/HOSPICE OF RN EA 15 MIN: HCPCS

## 2018-08-24 PROCEDURE — 665999 HH PPS REVENUE DEBIT

## 2018-08-24 ASSESSMENT — ENCOUNTER SYMPTOMS
NAUSEA: NO ISSUES AT THE TIME OF THIS ASSESSMENT.
DIFFICULTY THINKING: 1
VOMITING: NO ISSUES AT THE TIME OF THIS ASSESSMENT.
RESPIRATORY SYMPTOMS COMMENTS: NO ISSUES AT THE TIME OF THIS ASSESSMENT.

## 2018-08-24 ASSESSMENT — ACTIVITIES OF DAILY LIVING (ADL): TRANSPORTATION COMMENTS: REQUIRES ASSIST OF ONE OTHER PERSON TO LEAVE THE HOME

## 2018-08-25 PROCEDURE — 665999 HH PPS REVENUE DEBIT

## 2018-08-25 PROCEDURE — 665998 HH PPS REVENUE CREDIT

## 2018-08-26 PROCEDURE — 665999 HH PPS REVENUE DEBIT

## 2018-08-26 PROCEDURE — 665998 HH PPS REVENUE CREDIT

## 2018-08-27 ENCOUNTER — OFFICE VISIT (OUTPATIENT)
Dept: CARDIOLOGY | Facility: MEDICAL CENTER | Age: 68
End: 2018-08-27
Payer: MEDICARE

## 2018-08-27 VITALS
DIASTOLIC BLOOD PRESSURE: 76 MMHG | BODY MASS INDEX: 19.3 KG/M2 | HEART RATE: 84 BPM | HEIGHT: 67 IN | WEIGHT: 123 LBS | OXYGEN SATURATION: 96 % | SYSTOLIC BLOOD PRESSURE: 130 MMHG

## 2018-08-27 DIAGNOSIS — G30.9 ALZHEIMER'S DEMENTIA WITHOUT BEHAVIORAL DISTURBANCE, UNSPECIFIED TIMING OF DEMENTIA ONSET: ICD-10-CM

## 2018-08-27 DIAGNOSIS — F02.80 ALZHEIMER'S DEMENTIA WITHOUT BEHAVIORAL DISTURBANCE, UNSPECIFIED TIMING OF DEMENTIA ONSET: ICD-10-CM

## 2018-08-27 DIAGNOSIS — Z79.899 HIGH RISK MEDICATION USE: ICD-10-CM

## 2018-08-27 DIAGNOSIS — I47.10 PAROXYSMAL SVT (SUPRAVENTRICULAR TACHYCARDIA): ICD-10-CM

## 2018-08-27 DIAGNOSIS — Z86.73 HISTORY OF STROKE: ICD-10-CM

## 2018-08-27 PROCEDURE — 99214 OFFICE O/P EST MOD 30 MIN: CPT | Performed by: INTERNAL MEDICINE

## 2018-08-27 PROCEDURE — 665999 HH PPS REVENUE DEBIT

## 2018-08-27 PROCEDURE — 665998 HH PPS REVENUE CREDIT

## 2018-08-27 ASSESSMENT — ENCOUNTER SYMPTOMS
BRUISES/BLEEDS EASILY: 0
BLOOD IN STOOL: 0
CLAUDICATION: 0
CHILLS: 0
NAUSEA: 0
COUGH: 0
PALPITATIONS: 0
FALLS: 0
FEVER: 0
ORTHOPNEA: 0
DIZZINESS: 0
SENSORY CHANGE: 0
HALLUCINATIONS: 0
MYALGIAS: 0
WEIGHT LOSS: 0
HEADACHES: 0
BLURRED VISION: 0
DEPRESSION: 0
EYE PAIN: 0
SHORTNESS OF BREATH: 0
ABDOMINAL PAIN: 0
SPEECH CHANGE: 0
VOMITING: 0
DOUBLE VISION: 0
EYE DISCHARGE: 0
PND: 0
LOSS OF CONSCIOUSNESS: 0

## 2018-08-27 NOTE — LETTER
[facility]   [address], [address]  [place] [address]  Phone: [phone]  Fax: [phone]              Azul Milan  1950    Encounter Date: 8/27/2018    Stanton Jeffrey M.D.          PROGRESS NOTE:  Chief Complaint   Patient presents with   • Syncope     Hospital follow-up 06/28/2018       Subjective:   Azul Milan is a 68 y.o. female who presents today for cardiac care and evaluation after her recent hospital stay because of stroke.  I initially met patient in the hospital for further evaluation of possible implantable loop recorder implantation.  At that time, I reviewed her telemetry tracing which showed evidence of paroxysmal SVT with atrial arrhythmias.  In setting of stroke, I determined that implantable loop recorder might not be necessary.  She is indicated to be on anticoagulation therapy anyhow.    Past Medical History:   Diagnosis Date   • A-fib (HCC)    • Hypokalemia    • Hypotension    • Syncope      Past Surgical History:   Procedure Laterality Date   • COLONOSCOPY     • ENDOSCOPY     • TONSILLECTOMY     • TUBAL COAGULATION LAPAROSCOPIC BILATERAL       Family History   Problem Relation Age of Onset   • Dementia Mother    • Cancer Sister         breast   • Other Brother         wegeners grandulomatosis   • Lung Disease Paternal Grandfather         TB. Lung removal     Social History     Social History   • Marital status:      Spouse name: N/A   • Number of children: N/A   • Years of education: N/A     Occupational History   • Not on file.     Social History Main Topics   • Smoking status: Never Smoker   • Smokeless tobacco: Never Used   • Alcohol use No   • Drug use: No   • Sexual activity: Yes     Partners: Male     Other Topics Concern   • Not on file     Social History Narrative   • No narrative on file     Allergies   Allergen Reactions   • Other Food      Corn   • Penicillins Rash     Minor RASH          Outpatient Encounter Prescriptions as of 8/27/2018   Medication Sig Dispense Refill   • CALCIUM/MAGNESIUM/ZINC FORMULA PO Take 1 Tab by mouth every day.     • rivaroxaban (XARELTO) 20 MG Tab tablet Take 1 Tab by mouth with dinner. 30 Tab 2   • levETIRAcetam (KEPPRA) 500 MG Tab Take 1 Tab by mouth 2 Times a Day. 60 Tab 2   • atorvastatin (LIPITOR) 40 MG Tab Take 1 Tab by mouth every evening. 30 Tab 2   • multivitamin (THERAGRAN) Tab Take 1 Tab by mouth every day.     • B Complex Vitamins (B COMPLEX 1 PO) Take 1 Cap by mouth every day at 6 PM.     • Multiple Vitamins-Minerals (OCUVITE PO) Take 1 Tab by mouth every day.     • Omega-3 Fatty Acids (FISH OIL) 1000 MG Cap capsule Take 1,000 mg by mouth every day.     • Zinc 50 MG Tab Take 50 mg by mouth every day.     • Vitamin D, Cholecalciferol, 1000 UNITS Cap Take 1,000 Units by mouth every day.     • ciprofloxacin (CIPRO) 500 MG Tab Take 1 Tab by mouth 2 times a day. (Patient not taking: Reported on 8/21/2018) 20 Tab 0     No facility-administered encounter medications on file as of 8/27/2018.      Review of Systems   Constitutional: Negative for chills, fever, malaise/fatigue and weight loss.   HENT: Negative for ear discharge, ear pain, hearing loss and nosebleeds.    Eyes: Negative for blurred vision, double vision, pain and discharge.   Respiratory: Negative for cough and shortness of breath.    Cardiovascular: Negative for chest pain, palpitations, orthopnea, claudication, leg swelling and PND.   Gastrointestinal: Negative for abdominal pain, blood in stool, melena, nausea and vomiting.   Genitourinary: Negative for dysuria and hematuria.   Musculoskeletal: Negative for falls, joint pain and myalgias.   Skin: Negative for itching and rash.   Neurological: Negative for dizziness, sensory change, speech change, loss of consciousness and headaches.   Endo/Heme/Allergies: Negative for environmental allergies. Does not bruise/bleed easily.   Psychiatric/Behavioral:  "Negative for depression, hallucinations and suicidal ideas.        Objective:   /76   Pulse 84   Ht 1.702 m (5' 7\")   Wt 55.8 kg (123 lb)   SpO2 96%   BMI 19.26 kg/m²      Physical Exam   Constitutional: She is oriented to person, place, and time. No distress.   HENT:   Head: Normocephalic and atraumatic.   Right Ear: External ear normal.   Left Ear: External ear normal.   Eyes: Right eye exhibits no discharge. Left eye exhibits no discharge.   Neck: No JVD present. No thyromegaly present.   Cardiovascular: Normal rate, regular rhythm, normal heart sounds and intact distal pulses.  Exam reveals no gallop and no friction rub.    No murmur heard.  Pulmonary/Chest: Breath sounds normal. No respiratory distress.   Abdominal: Bowel sounds are normal. She exhibits no distension. There is no tenderness.   Musculoskeletal: She exhibits no edema or tenderness.   Neurological: She is alert and oriented to person, place, and time. No cranial nerve deficit.   Skin: Skin is warm and dry. She is not diaphoretic.   Psychiatric: She has a normal mood and affect. Her behavior is normal.   Nursing note and vitals reviewed.      Assessment:     1. Paroxysmal SVT (supraventricular tachycardia) (HCC)     2. History of stroke  COMP METABOLIC PANEL    LIPID PANEL   3. Alzheimer's dementia without behavioral disturbance, unspecified timing of dementia onset     4. High risk medication use         Medical Decision Making:  Today's Assessment / Status / Plan:   Patient also has baseline dementia.  Patient will see neurology for that.  In the meantime, no change in medical therapy.  Continue anticoagulation therapy.  Continue exercise as tolerated.      Rachelle Isaac M.D.  [address] [address]  VIA In Basket                 "

## 2018-08-28 ENCOUNTER — HOME CARE VISIT (OUTPATIENT)
Dept: HOME HEALTH SERVICES | Facility: HOME HEALTHCARE | Age: 68
End: 2018-08-28
Payer: MEDICARE

## 2018-08-28 VITALS
SYSTOLIC BLOOD PRESSURE: 124 MMHG | RESPIRATION RATE: 20 BRPM | OXYGEN SATURATION: 98 % | TEMPERATURE: 100.4 F | HEART RATE: 83 BPM | DIASTOLIC BLOOD PRESSURE: 68 MMHG

## 2018-08-28 PROCEDURE — 665999 HH PPS REVENUE DEBIT

## 2018-08-28 PROCEDURE — G0493 RN CARE EA 15 MIN HH/HOSPICE: HCPCS

## 2018-08-28 PROCEDURE — 665998 HH PPS REVENUE CREDIT

## 2018-08-28 ASSESSMENT — ACTIVITIES OF DAILY LIVING (ADL)
HOME_HEALTH_OASIS: 01
HOME_HEALTH_OASIS: 00
OASIS_M1830: 00

## 2018-08-28 ASSESSMENT — ENCOUNTER SYMPTOMS: DIFFICULTY THINKING: 1

## 2018-08-28 NOTE — PROGRESS NOTES
Chief Complaint   Patient presents with   • Syncope     Hospital follow-up 06/28/2018       Subjective:   Azul Milan is a 68 y.o. female who presents today for cardiac care and evaluation after her recent hospital stay because of stroke.  I initially met patient in the hospital for further evaluation of possible implantable loop recorder implantation.  At that time, I reviewed her telemetry tracing which showed evidence of paroxysmal SVT with atrial arrhythmias.  In setting of stroke, I determined that implantable loop recorder might not be necessary.  She is indicated to be on anticoagulation therapy anyhow.    Past Medical History:   Diagnosis Date   • A-fib (HCC)    • Hypokalemia    • Hypotension    • Syncope      Past Surgical History:   Procedure Laterality Date   • COLONOSCOPY     • ENDOSCOPY     • TONSILLECTOMY     • TUBAL COAGULATION LAPAROSCOPIC BILATERAL       Family History   Problem Relation Age of Onset   • Dementia Mother    • Cancer Sister         breast   • Other Brother         wegeners grandulomatosis   • Lung Disease Paternal Grandfather         TB. Lung removal     Social History     Social History   • Marital status:      Spouse name: N/A   • Number of children: N/A   • Years of education: N/A     Occupational History   • Not on file.     Social History Main Topics   • Smoking status: Never Smoker   • Smokeless tobacco: Never Used   • Alcohol use No   • Drug use: No   • Sexual activity: Yes     Partners: Male     Other Topics Concern   • Not on file     Social History Narrative   • No narrative on file     Allergies   Allergen Reactions   • Other Food      Corn   • Penicillins Rash     Minor RASH     Outpatient Encounter Prescriptions as of 8/27/2018   Medication Sig Dispense Refill   • CALCIUM/MAGNESIUM/ZINC FORMULA PO Take 1 Tab by mouth every day.     • rivaroxaban (XARELTO) 20 MG Tab tablet Take 1 Tab by mouth with dinner. 30 Tab 2   • levETIRAcetam (KEPPRA) 500 MG  "Tab Take 1 Tab by mouth 2 Times a Day. 60 Tab 2   • atorvastatin (LIPITOR) 40 MG Tab Take 1 Tab by mouth every evening. 30 Tab 2   • multivitamin (THERAGRAN) Tab Take 1 Tab by mouth every day.     • B Complex Vitamins (B COMPLEX 1 PO) Take 1 Cap by mouth every day at 6 PM.     • Multiple Vitamins-Minerals (OCUVITE PO) Take 1 Tab by mouth every day.     • Omega-3 Fatty Acids (FISH OIL) 1000 MG Cap capsule Take 1,000 mg by mouth every day.     • Zinc 50 MG Tab Take 50 mg by mouth every day.     • Vitamin D, Cholecalciferol, 1000 UNITS Cap Take 1,000 Units by mouth every day.     • ciprofloxacin (CIPRO) 500 MG Tab Take 1 Tab by mouth 2 times a day. (Patient not taking: Reported on 8/21/2018) 20 Tab 0     No facility-administered encounter medications on file as of 8/27/2018.      Review of Systems   Constitutional: Negative for chills, fever, malaise/fatigue and weight loss.   HENT: Negative for ear discharge, ear pain, hearing loss and nosebleeds.    Eyes: Negative for blurred vision, double vision, pain and discharge.   Respiratory: Negative for cough and shortness of breath.    Cardiovascular: Negative for chest pain, palpitations, orthopnea, claudication, leg swelling and PND.   Gastrointestinal: Negative for abdominal pain, blood in stool, melena, nausea and vomiting.   Genitourinary: Negative for dysuria and hematuria.   Musculoskeletal: Negative for falls, joint pain and myalgias.   Skin: Negative for itching and rash.   Neurological: Negative for dizziness, sensory change, speech change, loss of consciousness and headaches.   Endo/Heme/Allergies: Negative for environmental allergies. Does not bruise/bleed easily.   Psychiatric/Behavioral: Negative for depression, hallucinations and suicidal ideas.        Objective:   /76   Pulse 84   Ht 1.702 m (5' 7\")   Wt 55.8 kg (123 lb)   SpO2 96%   BMI 19.26 kg/m²     Physical Exam   Constitutional: She is oriented to person, place, and time. No distress. "   HENT:   Head: Normocephalic and atraumatic.   Right Ear: External ear normal.   Left Ear: External ear normal.   Eyes: Right eye exhibits no discharge. Left eye exhibits no discharge.   Neck: No JVD present. No thyromegaly present.   Cardiovascular: Normal rate, regular rhythm, normal heart sounds and intact distal pulses.  Exam reveals no gallop and no friction rub.    No murmur heard.  Pulmonary/Chest: Breath sounds normal. No respiratory distress.   Abdominal: Bowel sounds are normal. She exhibits no distension. There is no tenderness.   Musculoskeletal: She exhibits no edema or tenderness.   Neurological: She is alert and oriented to person, place, and time. No cranial nerve deficit.   Skin: Skin is warm and dry. She is not diaphoretic.   Psychiatric: She has a normal mood and affect. Her behavior is normal.   Nursing note and vitals reviewed.      Assessment:     1. Paroxysmal SVT (supraventricular tachycardia) (HCC)     2. History of stroke  COMP METABOLIC PANEL    LIPID PANEL   3. Alzheimer's dementia without behavioral disturbance, unspecified timing of dementia onset     4. High risk medication use         Medical Decision Making:  Today's Assessment / Status / Plan:   Patient also has baseline dementia.  Patient will see neurology for that.  In the meantime, no change in medical therapy.  Continue anticoagulation therapy.  Continue exercise as tolerated.

## 2018-08-29 PROCEDURE — 665998 HH PPS REVENUE CREDIT

## 2018-08-29 PROCEDURE — 665999 HH PPS REVENUE DEBIT

## 2018-08-30 ENCOUNTER — OFFICE VISIT (OUTPATIENT)
Dept: NEUROLOGY | Facility: MEDICAL CENTER | Age: 68
End: 2018-08-30
Payer: MEDICARE

## 2018-08-30 ENCOUNTER — HOSPITAL ENCOUNTER (OUTPATIENT)
Dept: LAB | Facility: MEDICAL CENTER | Age: 68
End: 2018-08-30
Attending: INTERNAL MEDICINE
Payer: MEDICARE

## 2018-08-30 VITALS
SYSTOLIC BLOOD PRESSURE: 120 MMHG | WEIGHT: 127 LBS | DIASTOLIC BLOOD PRESSURE: 86 MMHG | BODY MASS INDEX: 19.93 KG/M2 | HEART RATE: 88 BPM | TEMPERATURE: 98.8 F | HEIGHT: 67 IN | OXYGEN SATURATION: 98 %

## 2018-08-30 DIAGNOSIS — F02.80 EARLY ONSET ALZHEIMER'S DEMENTIA WITHOUT BEHAVIORAL DISTURBANCE (HCC): Primary | ICD-10-CM

## 2018-08-30 DIAGNOSIS — G40.209 COMPLEX PARTIAL SEIZURES WITH IMPAIRED CONSCIOUSNESS AT ONSET (HCC): ICD-10-CM

## 2018-08-30 DIAGNOSIS — Z86.73 HISTORY OF STROKE: ICD-10-CM

## 2018-08-30 DIAGNOSIS — G30.0 EARLY ONSET ALZHEIMER'S DEMENTIA WITHOUT BEHAVIORAL DISTURBANCE (HCC): Primary | ICD-10-CM

## 2018-08-30 LAB
ALBUMIN SERPL BCP-MCNC: 4.1 G/DL (ref 3.2–4.9)
ALBUMIN/GLOB SERPL: 1.5 G/DL
ALP SERPL-CCNC: 95 U/L (ref 30–99)
ALT SERPL-CCNC: 31 U/L (ref 2–50)
ANION GAP SERPL CALC-SCNC: 7 MMOL/L (ref 0–11.9)
AST SERPL-CCNC: 27 U/L (ref 12–45)
BILIRUB SERPL-MCNC: 0.6 MG/DL (ref 0.1–1.5)
BUN SERPL-MCNC: 18 MG/DL (ref 8–22)
CALCIUM SERPL-MCNC: 9 MG/DL (ref 8.5–10.5)
CHLORIDE SERPL-SCNC: 107 MMOL/L (ref 96–112)
CHOLEST SERPL-MCNC: 131 MG/DL (ref 100–199)
CO2 SERPL-SCNC: 28 MMOL/L (ref 20–33)
CREAT SERPL-MCNC: 0.8 MG/DL (ref 0.5–1.4)
GLOBULIN SER CALC-MCNC: 2.8 G/DL (ref 1.9–3.5)
GLUCOSE SERPL-MCNC: 91 MG/DL (ref 65–99)
HDLC SERPL-MCNC: 72 MG/DL
LDLC SERPL CALC-MCNC: 48 MG/DL
POTASSIUM SERPL-SCNC: 4.5 MMOL/L (ref 3.6–5.5)
PROT SERPL-MCNC: 6.9 G/DL (ref 6–8.2)
SODIUM SERPL-SCNC: 142 MMOL/L (ref 135–145)
TRIGL SERPL-MCNC: 55 MG/DL (ref 0–149)

## 2018-08-30 PROCEDURE — 80053 COMPREHEN METABOLIC PANEL: CPT

## 2018-08-30 PROCEDURE — 665999 HH PPS REVENUE DEBIT

## 2018-08-30 PROCEDURE — 99215 OFFICE O/P EST HI 40 MIN: CPT | Performed by: PSYCHIATRY & NEUROLOGY

## 2018-08-30 PROCEDURE — 80061 LIPID PANEL: CPT | Mod: GA

## 2018-08-30 PROCEDURE — 36415 COLL VENOUS BLD VENIPUNCTURE: CPT

## 2018-08-30 PROCEDURE — 665998 HH PPS REVENUE CREDIT

## 2018-08-30 RX ORDER — DONEPEZIL HYDROCHLORIDE 5 MG/1
5 TABLET, FILM COATED ORAL EVERY EVENING
Qty: 30 TAB | Refills: 0 | Status: SHIPPED | OUTPATIENT
Start: 2018-08-30 | End: 2019-04-01

## 2018-08-30 RX ORDER — LEVETIRACETAM 500 MG/1
500 TABLET ORAL 2 TIMES DAILY
Qty: 180 TAB | Refills: 3 | Status: SHIPPED | OUTPATIENT
Start: 2018-08-30 | End: 2019-04-01

## 2018-08-30 RX ORDER — DONEPEZIL HYDROCHLORIDE 10 MG/1
10 TABLET, FILM COATED ORAL EVERY EVENING
Qty: 30 TAB | Refills: 6 | Status: SHIPPED | OUTPATIENT
Start: 2018-08-30 | End: 2019-03-15 | Stop reason: SDUPTHER

## 2018-08-30 ASSESSMENT — ENCOUNTER SYMPTOMS
INSOMNIA: 0
MEMORY LOSS: 1
SEIZURES: 0
DEPRESSION: 0
FALLS: 0
HEADACHES: 0
TREMORS: 0
DIZZINESS: 0
LOSS OF CONSCIOUSNESS: 0

## 2018-08-31 NOTE — PROGRESS NOTES
Subjective:      Azul Milan is a 68 y.o. female who presents with her daughter Yessi and her  Christopher, for follow-up, seen in the hospital with a diagnosis of moderate dementia and new onset seizures.     HPI    Seizures: Since discharge from the hospital, seizures have stopped, cognitively she has brightened, she seems to be tolerating Keppra 500 mg, twice a day, without issue. There has been no major change in her personality, though she does note she is a bit more irritable at times, no problems with tremor, headache, dizziness or daytime sleepiness. She is compliant, Christopher always make sure that she takes her medicines. I reviewed the electronic health record, MRI scan of the brain done during the hospitalization revealed no evidence of acute ischemia, or EEG was consistent with an epileptiform disorder, she was admitted with severe hyponatremia, presumably SIADH-related, sodium levels were corrected at the time of discharge but she needs to be scheduled to see a nephrologist, Dr. Malave.    Dementia: Given the family history of risk in her mother, and her symptoms that have been present over the preceding several years, the diagnosis was fairly straightforward. I had expected some improvement simply by getting her seizures under better control, such is the case. 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 She still would like to be physically active. She denies any other issues with headache, appetite changes, etc. She is independent with her ADLs, there have been no significant changes in behavior and judgment otherwise. She is no longer driving.    Medical, surgical and family histories are reviewed in electronic health record, there are no new drug allergies. She is on Keppra 500 mg, twice a day, Lipitor 40 mg daily, fish oil, Xarelto 20  "mg daily, vitamin D and calcium supplements daily, Cipro 500 mg, twice a day, the rest is per the electronic health record, noncontributory from my standpoint.    Review of Systems   Constitutional: Negative for malaise/fatigue.   Musculoskeletal: Negative for falls.   Neurological: Negative for dizziness, tremors, seizures, loss of consciousness and headaches.   Psychiatric/Behavioral: Positive for memory loss. Negative for depression. The patient does not have insomnia.    All other systems reviewed and are negative.       Objective:     /86   Pulse 88   Temp 37.1 °C (98.8 °F)   Ht 1.702 m (5' 7\")   Wt 57.6 kg (127 lb)   SpO2 98%   BMI 19.89 kg/m²      Physical Exam    She appears in no acute distress. Vital signs are stable. There is no malar rash. Clean and appropriately dressed, she is cooperative. The neck is supple, range of motion is full. Cardiac evaluation reveals an irregular rhythm. There is no lower extremity edema.    When asked about the date, she has no clue. There is no aphasia, agnosia, apraxia, or inattention. She does repeat herself throughout the interview, she shows the examiner several magazines that she had enjoyed reading years before, talking about how she would begin to read them again to keep herself stimulated. She does this 2 other times throughout the interview. Frontal release signs are absent.    Otherwise, cranial nerve, motor and coordination evaluations are for the most part intact, assessment mostly through observation.     Assessment/Plan:     1. Early onset Alzheimer's dementia without behavioral disturbance  I suspect she does have Alzheimer's disease, though obviously the exact diagnosis can never be determined except on postmortem examination. Family understands the treatments available in our symptomatically based, experiment protocols are now being performed, she would never be candidates of the because of her comorbid seizure disorder. In any case, she was " encouraged to remain active physically and cognitively, she does not need to limit her activities. Family is also very supportive. We talked about medicines that are available including the cholinesterase inhibitors as well as Namenda and the rationale for my use of combination therapy. We talked about prognosis briefly especially as it cannot be predicted in terms of severity and time course. At present she is still competent, she and her  have already taking care of gore of  as well as living will for the patient.    Aricept 5 mg daily will be started for one month then 10 mg daily for another month, and continued, at which point Namenda XR will be added on a titration schedule starting at 7 mg daily, increasing by 7 mg increments weekly up to 28 mg daily. Side effects of the medicines were reviewed in full.    - donepezil (ARICEPT) 5 MG Tab; Take 1 Tab by mouth every evening.  Dispense: 30 Tab; Refill: 0  - donepezil (ARICEPT) 10 MG tablet; Take 1 Tab by mouth every evening.  Dispense: 30 Tab; Refill: 6    2. Complex partial seizures with impaired consciousness at onset (HCC)  She is doing well, Keppra will be maintained, we will need to keep an eye and the irritability and her personality change since the drug is known to do this. So far does not seem to be distracting. A repeat EEG will need to be done in another couple of months to see how well the drug is performing. We talked at length about circumstances under which she must go to emergency room for seizure recurrence versus simply contacting my office to discuss whether medication needs to be adjusted. We talked about circumstances that lower thresholds, including stress, sleep deprivation, medications, noncompliance, etc. An active kayaker, I am more concerned about having a seizure while she is in the water rather than her dementia getting in the way of making it unsafe; still, I believe she is safe to get back into it.    -  levETIRAcetam (KEPPRA) 500 MG Tab; Take 1 Tab by mouth 2 Times a Day.  Dispense: 180 Tab; Refill: 3    Face-to-face time was spent reviewing all of the above issues. Phone contact in the interim, we can follow-up in about 8 months after she has been on her combination therapy for her dementia for at least 6 months. Phone contact in the interim if needed.    Time: 40 minutes was spent face-to-face with the patient and her family for exam, review, discussion, and education, of this over 80% of the time was spent counseling and coordinating care.

## 2018-09-10 ENCOUNTER — PATIENT OUTREACH (OUTPATIENT)
Dept: HEALTH INFORMATION MANAGEMENT | Facility: OTHER | Age: 68
End: 2018-09-10

## 2018-09-17 NOTE — PROGRESS NOTES
Patient Azul Milan discharged on 8/09/18. [facility] Patient Advocate assisted with discharge orders including one PCP appointment, one cardiology appointment, and one neurology appointment. Patient is scheduled for a PCP appointment on 11/20/18, and a neurology appointment on 4/01/19. Patient also utilized [facility] services for skilled nursing. Patient discharged with a high LACE score, therefore, a PPS screening was conducted and the patient scored 90%.

## 2018-09-18 ENCOUNTER — HOSPITAL ENCOUNTER (OUTPATIENT)
Facility: MEDICAL CENTER | Age: 68
End: 2018-09-18
Payer: COMMERCIAL

## 2018-09-18 LAB
BDY FAT % MEASURED: 29.7 %
BP DIAS: 78 MMHG
BP SYS: 118 MMHG
DIABETES HTDIA: NO
EVENT NAME HTEVT: NORMAL
HYPERTENSION HTHYP: NO
SCREENING LOC CITY HTCIT: NORMAL
SCREENING LOC STATE HTSTA: NORMAL
SCREENING LOCATION HTLOC: NORMAL
SUBSCRIBER ID HTSID: NORMAL

## 2018-09-19 LAB
CHOLEST SERPL-MCNC: 123 MG/DL (ref 100–199)
FASTING STATUS PATIENT QL REPORTED: NORMAL
GLUCOSE SERPL-MCNC: 86 MG/DL (ref 65–99)
HDLC SERPL-MCNC: 74 MG/DL
LDLC SERPL CALC-MCNC: 41 MG/DL
TRIGL SERPL-MCNC: 42 MG/DL (ref 0–149)

## 2018-10-31 RX ORDER — RIVAROXABAN 20 MG/1
20 TABLET, FILM COATED ORAL
Qty: 90 TAB | Refills: 0 | Status: SHIPPED | OUTPATIENT
Start: 2018-10-31 | End: 2018-12-28 | Stop reason: SDUPTHER

## 2018-10-31 RX ORDER — ATORVASTATIN CALCIUM 40 MG/1
TABLET, FILM COATED ORAL
Qty: 90 TAB | Refills: 0 | Status: SHIPPED | OUTPATIENT
Start: 2018-10-31 | End: 2019-01-26 | Stop reason: SDUPTHER

## 2018-10-31 NOTE — TELEPHONE ENCOUNTER
Was the patient seen in the last year in this department? Yes    Does patient have an active prescription for medications requested? No     Received Request Via: Pharmacy      Pt met protocol?: Yes    LAST OV 08/15/2018    Lab Results  Component Value Date/Time   HBA1C 6.1 (H) 06/24/2018 2055       Lab Results  Component Value Date/Time   CHOLSTRLTOT 123 09/18/2018 0812       Lab Results  Component Value Date/Time   TRIGLYCERIDE 42 09/18/2018 0812       Lab Results  Component Value Date/Time   HDL 74 09/18/2018 0812       Lab Results  Component Value Date/Time   LDL 41 09/18/2018 0812

## 2018-11-20 ENCOUNTER — OFFICE VISIT (OUTPATIENT)
Dept: MEDICAL GROUP | Facility: PHYSICIAN GROUP | Age: 68
End: 2018-11-20
Payer: MEDICARE

## 2018-11-20 VITALS
OXYGEN SATURATION: 97 % | DIASTOLIC BLOOD PRESSURE: 82 MMHG | WEIGHT: 117 LBS | HEART RATE: 72 BPM | HEIGHT: 67 IN | TEMPERATURE: 98.2 F | RESPIRATION RATE: 12 BRPM | BODY MASS INDEX: 18.36 KG/M2 | SYSTOLIC BLOOD PRESSURE: 140 MMHG

## 2018-11-20 DIAGNOSIS — F02.80 EARLY ONSET ALZHEIMER'S DEMENTIA WITHOUT BEHAVIORAL DISTURBANCE (HCC): ICD-10-CM

## 2018-11-20 DIAGNOSIS — G40.209 COMPLEX PARTIAL SEIZURES WITH IMPAIRED CONSCIOUSNESS AT ONSET (HCC): ICD-10-CM

## 2018-11-20 DIAGNOSIS — G30.0 EARLY ONSET ALZHEIMER'S DEMENTIA WITHOUT BEHAVIORAL DISTURBANCE (HCC): ICD-10-CM

## 2018-11-20 DIAGNOSIS — I63.9 CEREBROVASCULAR ACCIDENT (CVA), UNSPECIFIED MECHANISM (HCC): ICD-10-CM

## 2018-11-20 DIAGNOSIS — I48.91 ATRIAL FIBRILLATION, UNSPECIFIED TYPE (HCC): ICD-10-CM

## 2018-11-20 DIAGNOSIS — M81.0 POSTMENOPAUSAL BONE LOSS: ICD-10-CM

## 2018-11-20 DIAGNOSIS — Z12.39 SCREENING FOR BREAST CANCER: ICD-10-CM

## 2018-11-20 PROCEDURE — 99214 OFFICE O/P EST MOD 30 MIN: CPT | Performed by: FAMILY MEDICINE

## 2018-11-20 NOTE — PROGRESS NOTES
Chief Complaint   Patient presents with   • Altered Mental Status     3 month f/v       HISTORY OF PRESENT ILLNESS: Patient is a 68 y.o. female established patient here today for the following concerns:      Azul is here with her  for follow up.  She had an event of possible TIA vs stroke s/p TPA usage.  Found to have hyponatremia and seizures.  Started on keppra, anticoagulated and started on statins.  Family had been noting some progressive short term memory loss reportedly years before this event last year.  Azul remains a very active and smart woman, who is very much frustrated by all of these events.  She is hoping to stop taking some of the medications.  She has follow up per  next week with Dr. Worthy.      Past Medical, Social, and Family history reviewed and updated in EPIC    Allergies:Other food and Penicillins    Current Outpatient Prescriptions   Medication Sig Dispense Refill   • XARELTO 20 MG Tab tablet TAKE 1 TAB BY MOUTH WITH DINNER. 90 Tab 0   • atorvastatin (LIPITOR) 40 MG Tab TAKE 1 TABLET BY MOUTH EVERY DAY IN THE EVENING 90 Tab 0   • levETIRAcetam (KEPPRA) 500 MG Tab Take 1 Tab by mouth 2 Times a Day. 180 Tab 3   • donepezil (ARICEPT) 10 MG tablet Take 1 Tab by mouth every evening. 30 Tab 6   • CALCIUM/MAGNESIUM/ZINC FORMULA PO Take 1 Tab by mouth every day.     • multivitamin (THERAGRAN) Tab Take 1 Tab by mouth every day.     • B Complex Vitamins (B COMPLEX 1 PO) Take 1 Cap by mouth every day at 6 PM.     • Multiple Vitamins-Minerals (OCUVITE PO) Take 1 Tab by mouth every day.     • Omega-3 Fatty Acids (FISH OIL) 1000 MG Cap capsule Take 1,000 mg by mouth every day.     • Zinc 50 MG Tab Take 50 mg by mouth every day.     • Vitamin D, Cholecalciferol, 1000 UNITS Cap Take 1,000 Units by mouth every day.     • donepezil (ARICEPT) 5 MG Tab Take 1 Tab by mouth every evening. (Patient not taking: Reported on 11/20/2018) 30 Tab 0     No current facility-administered medications for  "this visit.          ROS:  Review of Systems   Constitutional: Negative for fever, chills, weight loss and malaise/fatigue.   HENT: Negative for ear pain, nosebleeds, congestion, sore throat and neck pain.    Eyes: Negative for blurred vision.   Respiratory: Negative for cough, sputum production, shortness of breath and wheezing.    Cardiovascular: Negative for chest pain, palpitations,  and leg swelling.   Gastrointestinal: Negative for heartburn, nausea, vomiting, diarrhea and abdominal pain.   Genitourinary: Negative for dysuria, urgency and frequency.   Musculoskeletal: Negative for myalgias, back pain and joint pain.   Skin: Negative for rash and itching.   Neurological: Negative for dizziness, tingling, tremors, sensory change, focal weakness and headaches.   Endo/Heme/Allergies: Does not bruise/bleed easily.   Psychiatric/Behavioral: Negative for depression, anxiety, suicidal ideas, insomnia and memory loss.      Exam:  Blood pressure 140/82, pulse 72, temperature 36.8 °C (98.2 °F), temperature source Temporal, resp. rate 12, height 1.702 m (5' 7\"), weight 53.1 kg (117 lb), SpO2 97 %, not currently breastfeeding.    General:  Well nourished, well developed in NAD  Head is grossly normal.  Neck: Supple without JVD   Pulmonary:  Normal effort.   Cardiovascular: Regular rate  Extremities: no clubbing, cyanosis, or edema.  Psych: affect appropriate      Please note that this dictation was created using voice recognition software. I have made every reasonable attempt to correct obvious errors, but I expect that there are errors of grammar and possibly content that I did not discover before finalizing the note.    Assessment/Plan:  1. Screening for breast cancer  - MA-MAMMO SCREENING BILAT W/JUAN W/O CAD; Future    2. Postmenopausal bone loss  - DS-BONE DENSITY STUDY (DEXA); Future    3. Complex partial seizures with impaired consciousness at onset (HCC)  Continues on Keppra.  No further witnessed seizure activity. "      4. Early onset Alzheimer's dementia without behavioral disturbance  Continues on aricept.      5. Atrial fibrillation, unspecified type (HCC)  Continues on Xarelto.  Rate is good.  On Statin therapy with normal LDL prior to treatment and no evidence of Carotid disease.  Query if needed, will consult with cardiologist.     6. Cerebrovascular accident (CVA), unspecified mechanism (HCC)  ? CTA with possible partial occlusion of M1 branch of MCA.  Thrombolytics given in hospital.  Now anticoagulated with xarelto.  No further events.       Follow up in 3-6 months, sooner prn.

## 2018-11-26 ENCOUNTER — TELEPHONE (OUTPATIENT)
Dept: MEDICAL GROUP | Facility: PHYSICIAN GROUP | Age: 68
End: 2018-11-26

## 2018-11-27 NOTE — TELEPHONE ENCOUNTER
Phone Number Called: [phone]    Message: PT. INFORMED     Left Message for patient to call back: no

## 2018-12-05 ENCOUNTER — HOSPITAL ENCOUNTER (OUTPATIENT)
Dept: RADIOLOGY | Facility: MEDICAL CENTER | Age: 68
End: 2018-12-05

## 2018-12-14 ENCOUNTER — HOSPITAL ENCOUNTER (OUTPATIENT)
Dept: RADIOLOGY | Facility: MEDICAL CENTER | Age: 68
End: 2018-12-14
Attending: FAMILY MEDICINE
Payer: MEDICARE

## 2018-12-14 DIAGNOSIS — M81.0 POSTMENOPAUSAL BONE LOSS: ICD-10-CM

## 2018-12-14 PROCEDURE — 77080 DXA BONE DENSITY AXIAL: CPT

## 2018-12-28 NOTE — TELEPHONE ENCOUNTER
Was the patient seen in the last year in this department? Yes    Does patient have an active prescription for medications requested? Yes    Received Request Via: Pharmacy     90 day Prescription request.

## 2018-12-31 NOTE — TELEPHONE ENCOUNTER
Was the patient seen in the last year in this department? Yes    Does patient have an active prescription for medications requested? No     Received Request Via: Pharmacy      Pt met protocol?: Yes, labs 9/18 ov 11/18 90ds

## 2019-01-02 ENCOUNTER — TELEPHONE (OUTPATIENT)
Dept: NEUROLOGY | Facility: MEDICAL CENTER | Age: 69
End: 2019-01-02

## 2019-01-02 DIAGNOSIS — G40.209 COMPLEX PARTIAL SEIZURES WITH IMPAIRED CONSCIOUSNESS AT ONSET (HCC): ICD-10-CM

## 2019-01-02 RX ORDER — LEVETIRACETAM 500 MG/1
TABLET, FILM COATED, EXTENDED RELEASE ORAL
Qty: 60 TAB | Refills: 3 | Status: SHIPPED | OUTPATIENT
Start: 2019-01-02 | End: 2019-04-27 | Stop reason: SDUPTHER

## 2019-01-03 NOTE — TELEPHONE ENCOUNTER
I have sent in a prescription for Keppra  mg formulation, I want them to start as a single tablet every evening for 2 weeks and then they can increase to 2 tablets every evening.

## 2019-01-03 NOTE — TELEPHONE ENCOUNTER
There is a national shortage of Keppra. Right now the pharmacy is only able to get 750 mg. The patient will be out in 3 days. Please advise.

## 2019-01-28 RX ORDER — ATORVASTATIN CALCIUM 40 MG/1
TABLET, FILM COATED ORAL
Qty: 90 TAB | Refills: 2 | Status: SHIPPED | OUTPATIENT
Start: 2019-01-28 | End: 2019-04-01

## 2019-01-28 NOTE — TELEPHONE ENCOUNTER
Pt has had OV within the 12 month protocol and lipid panel is current. 9 month supply sent to pharmacy.   Lab Results   Component Value Date/Time    CHOLSTRLTOT 123 09/18/2018 08:12 AM    LDL 41 09/18/2018 08:12 AM    HDL 74 09/18/2018 08:12 AM    TRIGLYCERIDE 42 09/18/2018 08:12 AM       Lab Results   Component Value Date/Time    SODIUM 142 08/30/2018 06:20 AM    POTASSIUM 4.5 08/30/2018 06:20 AM    CHLORIDE 107 08/30/2018 06:20 AM    CO2 28 08/30/2018 06:20 AM    GLUCOSE 86 09/18/2018 08:12 AM    BUN 18 08/30/2018 06:20 AM    CREATININE 0.80 08/30/2018 06:20 AM     Lab Results   Component Value Date/Time    ALKPHOSPHAT 95 08/30/2018 06:20 AM    ASTSGOT 27 08/30/2018 06:20 AM    ALTSGPT 31 08/30/2018 06:20 AM    TBILIRUBIN 0.6 08/30/2018 06:20 AM

## 2019-01-28 NOTE — TELEPHONE ENCOUNTER
Was the patient seen in the last year in this department? Yes    Does patient have an active prescription for medications requested? No     Received Request Via: Pharmacy      Pt met protocol?: Yes    Pt last ov 11/18   Lab Results  Component Value Date/Time   CHOLSTRLTOT 123 09/18/2018 0812   TRIGLYCERIDE 42 09/18/2018 0812   HDL 74 09/18/2018 0812   LDL 41 09/18/2018 0812

## 2019-03-15 DIAGNOSIS — G30.0 EARLY ONSET ALZHEIMER'S DEMENTIA WITHOUT BEHAVIORAL DISTURBANCE (HCC): ICD-10-CM

## 2019-03-15 DIAGNOSIS — F02.80 EARLY ONSET ALZHEIMER'S DEMENTIA WITHOUT BEHAVIORAL DISTURBANCE (HCC): ICD-10-CM

## 2019-03-15 RX ORDER — DONEPEZIL HYDROCHLORIDE 10 MG/1
10 TABLET, FILM COATED ORAL EVERY EVENING
Qty: 90 TAB | Refills: 3 | Status: SHIPPED | OUTPATIENT
Start: 2019-03-15 | End: 2019-08-30

## 2019-03-18 ENCOUNTER — TELEPHONE (OUTPATIENT)
Dept: NEUROLOGY | Facility: MEDICAL CENTER | Age: 69
End: 2019-03-18

## 2019-03-18 NOTE — TELEPHONE ENCOUNTER
Patient's  called for Dr. Worthy and he is wondering if she needs to have any labs done prior to her follow up? Please advise.

## 2019-04-01 ENCOUNTER — OFFICE VISIT (OUTPATIENT)
Dept: NEUROLOGY | Facility: MEDICAL CENTER | Age: 69
End: 2019-04-01
Payer: MEDICARE

## 2019-04-01 VITALS
OXYGEN SATURATION: 98 % | HEIGHT: 67 IN | HEART RATE: 68 BPM | WEIGHT: 114.7 LBS | BODY MASS INDEX: 18 KG/M2 | DIASTOLIC BLOOD PRESSURE: 80 MMHG | SYSTOLIC BLOOD PRESSURE: 122 MMHG | TEMPERATURE: 97.6 F

## 2019-04-01 DIAGNOSIS — G30.0 EARLY ONSET ALZHEIMER'S DEMENTIA WITHOUT BEHAVIORAL DISTURBANCE (HCC): Primary | ICD-10-CM

## 2019-04-01 DIAGNOSIS — G40.209 COMPLEX PARTIAL SEIZURES WITH IMPAIRED CONSCIOUSNESS AT ONSET (HCC): ICD-10-CM

## 2019-04-01 DIAGNOSIS — F02.80 EARLY ONSET ALZHEIMER'S DEMENTIA WITHOUT BEHAVIORAL DISTURBANCE (HCC): Primary | ICD-10-CM

## 2019-04-01 PROCEDURE — 99215 OFFICE O/P EST HI 40 MIN: CPT | Performed by: PSYCHIATRY & NEUROLOGY

## 2019-04-01 RX ORDER — DIVALPROEX SODIUM 250 MG/1
TABLET, EXTENDED RELEASE ORAL
Qty: 60 TAB | Refills: 3 | Status: SHIPPED | OUTPATIENT
Start: 2019-04-01 | End: 2019-04-29 | Stop reason: SINTOL

## 2019-04-01 ASSESSMENT — ENCOUNTER SYMPTOMS
DEPRESSION: 1
SEIZURES: 1
LOSS OF CONSCIOUSNESS: 0
NAUSEA: 0
CONSTIPATION: 0
MEMORY LOSS: 1

## 2019-04-01 ASSESSMENT — PATIENT HEALTH QUESTIONNAIRE - PHQ9
SUM OF ALL RESPONSES TO PHQ QUESTIONS 1-9: 15
CLINICAL INTERPRETATION OF PHQ2 SCORE: 6
5. POOR APPETITE OR OVEREATING: 1 - SEVERAL DAYS

## 2019-04-01 NOTE — PROGRESS NOTES
Subjective:      Azul Milan is a 68 y.o. female who presents with her  Christopher and daughter Yessi, for follow-up, with a history of mild to moderate Alzheimer's disease and focal seizures with altered sensorium.     HPI    Dementia: Over the last 6 months, on Aricept 10 mg every evening, cognitively, she seems to be doing well, still a little bit more forgetful, she also becomes a little bit more perseverative with her routine activities.  Christopher now has to be sure that she not only gets her medications but takes them.  She is not prone to impulsive wandering.  There is still out and exercising regularly, she does not wander.  She is sleeping well.  There have been no issues with hallucinations or nightmares on the Aricept.    She still is independent with her ADLs, eats regularly without cueing, balance is stable, bowel and bladder function are maintained easily.  She feels that her memory is problematic, but she also feels that she is compensating well for this.    The bigger concern has to do with her mood and personality, she has ranged anywhere from severe irritability with the slightest inconvenience, all the way to significant depression with significant guilt, feelings of hopelessness, talking about life without her, etc.    Seizures: There had been notable improvement when her hyponatremia was corrected and Keppra ER 1000 g nightly was started.  Compliance, obviously, is not an issue, she does not drink.  About 10 days ago, for several days, they were out on vacation, but she had absolutely no energy to engage in any type of physical activity.  She was more confused, seemed not herself, they noted some automatisms with lip smacking and a continuous twitching movement with the right upper extremity.  All of this persisted intermittently for several days, then resolved spontaneously and she is now back at baseline.    Prior to this she had been in her usual state of health, there were  "no new medications started, she was sleeping well and eating regularly.  She was not using any OTC medications.    Medical, surgical and family histories are reviewed in the electronic health record, there are no new drug allergies.  Her atrial fibrillation has been well controlled, there are no new medical diagnoses.  She is on Aricept 10 mg every evening, Xarelto 20 mg every evening, Keppra ER 1000 mill grams nightly, vitamin D and calcium, the rest as per the electronic health record, noncontributory from my standpoint.    Review of Systems   Constitutional: Positive for malaise/fatigue.   Cardiovascular: Negative for leg swelling.   Gastrointestinal: Negative for constipation and nausea.   Neurological: Positive for seizures. Negative for loss of consciousness.   Psychiatric/Behavioral: Positive for depression and memory loss. Negative for suicidal ideas.   All other systems reviewed and are negative.       Objective:     /80 (BP Location: Left arm, Patient Position: Sitting, BP Cuff Size: Adult)   Pulse 68   Temp 36.4 °C (97.6 °F)   Ht 1.702 m (5' 7\")   Wt 52 kg (114 lb 11.2 oz)   SpO2 98%   BMI 17.96 kg/m²      Physical Exam    She appears in no acute distress.  Clean and appropriately dressed, she is quite cooperative.  Vital signs are stable.  There is no malar rash.  The neck is supple.  Cardiac evaluation is unremarkable.    Though she does not know the date, she actually can individually state the month, date and the month and the year without issue.  She is still a little slow to respond, there are some word finding issues though paraphasic errors are not used.  There is rostrocaudal extinction, but no lateralized inattention, apraxia, agnosia, or comprehension impairment.    PERRLA/EOMI, visual fields are full, facial movements are symmetric and there is no hypophonia or dysarthria.  The tongue and uvula are midline.  Sensory exam is intact to temperature and light touch bilaterally.  " Shoulder shrug and head rotation are intact and symmetric.    Musculoskeletal exam reveals normal tone, there is no tremor, asterixis, or drift.  There are no other involuntary movements involving the right upper extremity.  Strength is intact bilaterally.  There are no reflex asymmetries.    She stands easily, there is neither appendicular nor truncal dystaxia.  Arm swing is symmetric.  Repetitive movements and fine motor control are intact and symmetric with normal amplitude and frequencies bilaterally.    Sensory exam is intact to vibration and temperature.     Assessment/Plan:     1. Early onset Alzheimer's dementia without behavioral disturbance  I am a little concerned about this episode a little while ago with a sustained interval of acute change in cognitive and motor functions.  MRI of the brain will be done, even though there was no direct head trauma, subdural hematoma often occurs as a nontraumatic complication especially in patients on anticoagulation.  Though I suspect a lot of the motor activity was ictal related, structural pathology does need to be ruled out as a cause.  Fortunately, her neurologic examination is not obviously focal.    The behavioral problems do occur with the disease, hers is about a level 2-3 severity, so mood stabilizer can be used.  Some of them are antiepileptics, we may be able to kill 2 birds with one stone.  Depakote  mg will be started in the morning for 3 weeks and then increase to 500 mg.  Keppra can also be increased to 1500 mg every evening, I do not think the drug itself is a cause for the sudden changes in her emotional state.  Phone contact as we adjust medication.  We will call them with the results of the MRI scan if abnormal, otherwise we talk specifics at her follow-up visit.    We talked briefly about her disease, its severity, expected progression over time, etc.  She was encouraged to remain physically active as studies have indicated this to be  effective for reducing the risk of cognitive impairment and dementia, as well as cardiac and cerebrovascular health.    - MR-BRAIN-W/O; Future  - divalproex ER (DEPAKOTE ER) 250 MG TABLET SR 24 HR; 1 tab qAM for 3 weeks, then 2 tab qAM as directed  Dispense: 60 Tab; Refill: 3    2. Complex partial seizures with impaired consciousness at onset (HCC)  I am more concerned of this as a cause for the change in mentation as well as the involuntary movements.  The clinical symptoms are typical of the seizure she has had in the past.  Again, structural pathology as a cause for seizure recurrence does need to be ruled out, MRI being ordered, and an EEG as well.  Depakote will be adjusted as above.  They will keep track of these events, notify the office if they were to continue unabated, even after she has gotten on Depakote.  A 6-month follow-up is scheduled.    - REFERRAL TO NEURODIAGNOSTICS (EEG,EP,EMG/NCS/DBS) Modality Requested: EEG-Video  - divalproex ER (DEPAKOTE ER) 250 MG TABLET SR 24 HR; 1 tab qAM for 3 weeks, then 2 tab qAM as directed  Dispense: 60 Tab; Refill: 3    Time: 40 minutes spent face-to-face for exam, review, discussion, and education, of this over 50% of the time spent counseling and coordinating care surrounding all of the above.

## 2019-04-23 ENCOUNTER — HOSPITAL ENCOUNTER (OUTPATIENT)
Dept: RADIOLOGY | Facility: MEDICAL CENTER | Age: 69
End: 2019-04-23
Attending: PSYCHIATRY & NEUROLOGY
Payer: MEDICARE

## 2019-04-23 DIAGNOSIS — G30.0 EARLY ONSET ALZHEIMER'S DEMENTIA WITHOUT BEHAVIORAL DISTURBANCE (HCC): ICD-10-CM

## 2019-04-23 DIAGNOSIS — F02.80 EARLY ONSET ALZHEIMER'S DEMENTIA WITHOUT BEHAVIORAL DISTURBANCE (HCC): ICD-10-CM

## 2019-04-23 PROCEDURE — 70551 MRI BRAIN STEM W/O DYE: CPT

## 2019-04-24 ENCOUNTER — TELEPHONE (OUTPATIENT)
Dept: MEDICAL GROUP | Facility: PHYSICIAN GROUP | Age: 69
End: 2019-04-24

## 2019-04-24 DIAGNOSIS — F03.91 DEMENTIA WITH BEHAVIORAL DISTURBANCE, UNSPECIFIED DEMENTIA TYPE: ICD-10-CM

## 2019-04-24 DIAGNOSIS — F32.9 CURRENT EPISODE OF MAJOR DEPRESSIVE DISORDER WITHOUT PRIOR EPISODE, UNSPECIFIED DEPRESSION EPISODE SEVERITY: ICD-10-CM

## 2019-04-24 NOTE — TELEPHONE ENCOUNTER
----- Message from Azul Milan sent at 4/24/2019 12:32 PM PDT -----  Regarding: Non-Urgent Medical Question  Contact: [phone]  Jose Isaac,  This is Regina Ross's daugther. She is struggling with depression due to the dementia diagnosis, and we have been referred to Dr. Nataliya Westbrook ([phone]) by the [employer]. Dr. Westbrook scheduled my mom for May 28, but they need a referral from you to make sure insurance pays for that. Can you please send a referral to them so my mom can get therapy? I get her MyChart notices, so I will see your response to this email. Thank you for helping us. Faby

## 2019-04-24 NOTE — TELEPHONE ENCOUNTER
I attempted to pend a referral for this.  Unable to find the provider that they want Azul to go to.

## 2019-04-25 NOTE — TELEPHONE ENCOUNTER
Was the patient seen in the last year in this department? Yes 11/20/2018    Does patient have an active prescription for medications requested? Yes    Received Request Via: Pharmacy

## 2019-04-25 NOTE — TELEPHONE ENCOUNTER
Pt last seen regarding this issue 11/18. Will send 6 months of fills to pharmacy. Renal function is fine.

## 2019-04-27 DIAGNOSIS — G40.209 COMPLEX PARTIAL SEIZURES WITH IMPAIRED CONSCIOUSNESS AT ONSET (HCC): ICD-10-CM

## 2019-04-29 ENCOUNTER — TELEPHONE (OUTPATIENT)
Dept: NEUROLOGY | Facility: MEDICAL CENTER | Age: 69
End: 2019-04-29

## 2019-04-29 DIAGNOSIS — G30.0 EARLY ONSET ALZHEIMER'S DEMENTIA WITHOUT BEHAVIORAL DISTURBANCE (HCC): ICD-10-CM

## 2019-04-29 DIAGNOSIS — F02.80 EARLY ONSET ALZHEIMER'S DEMENTIA WITHOUT BEHAVIORAL DISTURBANCE (HCC): ICD-10-CM

## 2019-04-29 RX ORDER — LEVETIRACETAM 500 MG/1
TABLET, FILM COATED, EXTENDED RELEASE ORAL
Qty: 180 TAB | Refills: 3 | Status: SHIPPED | OUTPATIENT
Start: 2019-04-29 | End: 2019-05-26

## 2019-04-29 RX ORDER — ESCITALOPRAM OXALATE 10 MG/1
10 TABLET ORAL DAILY
Qty: 30 TAB | Refills: 2 | Status: SHIPPED | OUTPATIENT
Start: 2019-04-29 | End: 2019-07-10

## 2019-04-30 NOTE — TELEPHONE ENCOUNTER
Find out how much Depakote the patient is taking.  If she is only on 1 tablet a day, she can stop completely.  If she is on 2 tablets daily, she will need to titrate down going to 1 tablet daily for 1 week before stopping.    Once the drowsiness and soporific side effects have resolved, I will start Lexapro 10 mg daily.  This is a more activating antidepressant.  It is safe to take in patients with seizures.  She may need to go to a higher dose but we need to tread slowly, she seems to be sensitive to side effects.  He will also take weeks for therapeutic benefit to be seen.

## 2019-04-30 NOTE — TELEPHONE ENCOUNTER
----- Message from Alfredito Carrillo, Med Ass't sent at 4/29/2019  9:37 AM PDT -----  Regarding: FW: Non-Urgent Medical Question  Contact: [phone]      ----- Message -----  From: Azul Tapia Kayli  Sent: 4/24/2019  12:19 PM  To: Neurology [facility]  Subject: Non-Urgent Medical Question                      Dr. Mendoza,  This is Faby StevensRegina's daughter. We left a message today with your assistant. The mood stabilizer you prescribed for my mom really knocked her out, and my dad does not want her on that one because she was very, very low energy on it, didn't work well. Is there anything else she can try? She is very depressed, and having a hard time with dealing with this diagnosis, grief, depression, etc. Thank you very much. I get her My Chart notices so I will respond to you. Thank you, Faby

## 2019-05-26 ENCOUNTER — APPOINTMENT (OUTPATIENT)
Dept: RADIOLOGY | Facility: MEDICAL CENTER | Age: 69
End: 2019-05-26
Attending: EMERGENCY MEDICINE
Payer: MEDICARE

## 2019-05-26 ENCOUNTER — HOSPITAL ENCOUNTER (OUTPATIENT)
Facility: MEDICAL CENTER | Age: 69
End: 2019-05-27
Attending: EMERGENCY MEDICINE | Admitting: HOSPITALIST
Payer: MEDICARE

## 2019-05-26 DIAGNOSIS — E87.1 HYPONATREMIA: ICD-10-CM

## 2019-05-26 DIAGNOSIS — R55 NEAR SYNCOPE: ICD-10-CM

## 2019-05-26 PROBLEM — F03.90 DEMENTIA (HCC): Status: ACTIVE | Noted: 2019-05-26

## 2019-05-26 PROBLEM — R79.89 ABNORMAL LFTS: Status: ACTIVE | Noted: 2019-05-26

## 2019-05-26 LAB
ALBUMIN SERPL BCP-MCNC: 4.6 G/DL (ref 3.2–4.9)
ALBUMIN/GLOB SERPL: 2.2 G/DL
ALP SERPL-CCNC: 124 U/L (ref 30–99)
ALT SERPL-CCNC: 79 U/L (ref 2–50)
ANION GAP SERPL CALC-SCNC: 6 MMOL/L (ref 0–11.9)
APPEARANCE UR: CLEAR
AST SERPL-CCNC: 81 U/L (ref 12–45)
BACTERIA #/AREA URNS HPF: NEGATIVE /HPF
BASOPHILS # BLD AUTO: 0.5 % (ref 0–1.8)
BASOPHILS # BLD: 0.03 K/UL (ref 0–0.12)
BILIRUB SERPL-MCNC: 0.5 MG/DL (ref 0.1–1.5)
BILIRUB UR QL STRIP.AUTO: NEGATIVE
BUN SERPL-MCNC: 13 MG/DL (ref 8–22)
CALCIUM SERPL-MCNC: 9.1 MG/DL (ref 8.5–10.5)
CHLORIDE SERPL-SCNC: 97 MMOL/L (ref 96–112)
CHLORIDE UR-SCNC: 33 MMOL/L
CO2 SERPL-SCNC: 24 MMOL/L (ref 20–33)
COLOR UR: YELLOW
CREAT SERPL-MCNC: 0.59 MG/DL (ref 0.5–1.4)
CREAT UR-MCNC: 15.3 MG/DL
EKG IMPRESSION: NORMAL
EOSINOPHIL # BLD AUTO: 0.03 K/UL (ref 0–0.51)
EOSINOPHIL NFR BLD: 0.5 % (ref 0–6.9)
EPI CELLS #/AREA URNS HPF: NEGATIVE /HPF
ERYTHROCYTE [DISTWIDTH] IN BLOOD BY AUTOMATED COUNT: 42.8 FL (ref 35.9–50)
GLOBULIN SER CALC-MCNC: 2.1 G/DL (ref 1.9–3.5)
GLUCOSE SERPL-MCNC: 142 MG/DL (ref 65–99)
GLUCOSE UR STRIP.AUTO-MCNC: NEGATIVE MG/DL
HCT VFR BLD AUTO: 39.5 % (ref 37–47)
HGB BLD-MCNC: 13.1 G/DL (ref 12–16)
HYALINE CASTS #/AREA URNS LPF: NORMAL /LPF
IMM GRANULOCYTES # BLD AUTO: 0.02 K/UL (ref 0–0.11)
IMM GRANULOCYTES NFR BLD AUTO: 0.3 % (ref 0–0.9)
KETONES UR STRIP.AUTO-MCNC: NEGATIVE MG/DL
LEUKOCYTE ESTERASE UR QL STRIP.AUTO: ABNORMAL
LYMPHOCYTES # BLD AUTO: 1.57 K/UL (ref 1–4.8)
LYMPHOCYTES NFR BLD: 24.5 % (ref 22–41)
MCH RBC QN AUTO: 31.5 PG (ref 27–33)
MCHC RBC AUTO-ENTMCNC: 33.2 G/DL (ref 33.6–35)
MCV RBC AUTO: 95 FL (ref 81.4–97.8)
MICRO URNS: ABNORMAL
MONOCYTES # BLD AUTO: 0.44 K/UL (ref 0–0.85)
MONOCYTES NFR BLD AUTO: 6.9 % (ref 0–13.4)
NEUTROPHILS # BLD AUTO: 4.31 K/UL (ref 2–7.15)
NEUTROPHILS NFR BLD: 67.3 % (ref 44–72)
NITRITE UR QL STRIP.AUTO: NEGATIVE
NRBC # BLD AUTO: 0 K/UL
NRBC BLD-RTO: 0 /100 WBC
OSMOLALITY SERPL: 280 MOSM/KG H2O (ref 278–298)
OSMOLALITY UR: 156 MOSM/KG H2O (ref 300–900)
PH UR STRIP.AUTO: 6.5 [PH]
PLATELET # BLD AUTO: 243 K/UL (ref 164–446)
PMV BLD AUTO: 10.4 FL (ref 9–12.9)
POTASSIUM SERPL-SCNC: 3.9 MMOL/L (ref 3.6–5.5)
POTASSIUM UR-SCNC: 10.9 MMOL/L
PROT SERPL-MCNC: 6.7 G/DL (ref 6–8.2)
PROT UR QL STRIP: NEGATIVE MG/DL
PROT UR-MCNC: <4 MG/DL (ref 0–15)
RBC # BLD AUTO: 4.16 M/UL (ref 4.2–5.4)
RBC # URNS HPF: NORMAL /HPF
RBC UR QL AUTO: NEGATIVE
SODIUM SERPL-SCNC: 127 MMOL/L (ref 135–145)
SODIUM UR-SCNC: 29 MMOL/L
SP GR UR STRIP.AUTO: 1
TROPONIN I SERPL-MCNC: <0.01 NG/ML (ref 0–0.04)
UROBILINOGEN UR STRIP.AUTO-MCNC: 0.2 MG/DL
WBC # BLD AUTO: 6.4 K/UL (ref 4.8–10.8)
WBC #/AREA URNS HPF: NORMAL /HPF

## 2019-05-26 PROCEDURE — 84156 ASSAY OF PROTEIN URINE: CPT

## 2019-05-26 PROCEDURE — 81001 URINALYSIS AUTO W/SCOPE: CPT

## 2019-05-26 PROCEDURE — 93005 ELECTROCARDIOGRAM TRACING: CPT

## 2019-05-26 PROCEDURE — A9270 NON-COVERED ITEM OR SERVICE: HCPCS | Performed by: EMERGENCY MEDICINE

## 2019-05-26 PROCEDURE — 85025 COMPLETE CBC W/AUTO DIFF WBC: CPT

## 2019-05-26 PROCEDURE — G0378 HOSPITAL OBSERVATION PER HR: HCPCS

## 2019-05-26 PROCEDURE — 83935 ASSAY OF URINE OSMOLALITY: CPT

## 2019-05-26 PROCEDURE — 700105 HCHG RX REV CODE 258: Performed by: EMERGENCY MEDICINE

## 2019-05-26 PROCEDURE — 84300 ASSAY OF URINE SODIUM: CPT

## 2019-05-26 PROCEDURE — 99220 PR INITIAL OBSERVATION CARE,LEVL III: CPT | Performed by: HOSPITALIST

## 2019-05-26 PROCEDURE — 93005 ELECTROCARDIOGRAM TRACING: CPT | Performed by: EMERGENCY MEDICINE

## 2019-05-26 PROCEDURE — 36415 COLL VENOUS BLD VENIPUNCTURE: CPT

## 2019-05-26 PROCEDURE — 84484 ASSAY OF TROPONIN QUANT: CPT

## 2019-05-26 PROCEDURE — 83930 ASSAY OF BLOOD OSMOLALITY: CPT

## 2019-05-26 PROCEDURE — A9270 NON-COVERED ITEM OR SERVICE: HCPCS | Performed by: HOSPITALIST

## 2019-05-26 PROCEDURE — 700102 HCHG RX REV CODE 250 W/ 637 OVERRIDE(OP): Performed by: HOSPITALIST

## 2019-05-26 PROCEDURE — 99285 EMERGENCY DEPT VISIT HI MDM: CPT

## 2019-05-26 PROCEDURE — 84133 ASSAY OF URINE POTASSIUM: CPT

## 2019-05-26 PROCEDURE — 700102 HCHG RX REV CODE 250 W/ 637 OVERRIDE(OP): Performed by: EMERGENCY MEDICINE

## 2019-05-26 PROCEDURE — 82570 ASSAY OF URINE CREATININE: CPT

## 2019-05-26 PROCEDURE — 71045 X-RAY EXAM CHEST 1 VIEW: CPT

## 2019-05-26 PROCEDURE — 80053 COMPREHEN METABOLIC PANEL: CPT

## 2019-05-26 PROCEDURE — 80074 ACUTE HEPATITIS PANEL: CPT

## 2019-05-26 PROCEDURE — 82436 ASSAY OF URINE CHLORIDE: CPT

## 2019-05-26 RX ORDER — DONEPEZIL HYDROCHLORIDE 5 MG/1
10 TABLET, FILM COATED ORAL EVERY EVENING
Status: DISCONTINUED | OUTPATIENT
Start: 2019-05-26 | End: 2019-05-27 | Stop reason: HOSPADM

## 2019-05-26 RX ORDER — ONDANSETRON 2 MG/ML
4 INJECTION INTRAMUSCULAR; INTRAVENOUS EVERY 4 HOURS PRN
Status: DISCONTINUED | OUTPATIENT
Start: 2019-05-26 | End: 2019-05-27 | Stop reason: HOSPADM

## 2019-05-26 RX ORDER — SODIUM CHLORIDE 1 G/1
1 TABLET ORAL 2 TIMES DAILY WITH MEALS
Status: DISCONTINUED | OUTPATIENT
Start: 2019-05-27 | End: 2019-05-27

## 2019-05-26 RX ORDER — B-COMPLEX WITH VITAMIN C
1 TABLET ORAL EVERY MORNING
COMMUNITY
End: 2020-07-03

## 2019-05-26 RX ORDER — SODIUM CHLORIDE 9 MG/ML
1000 INJECTION, SOLUTION INTRAVENOUS ONCE
Status: COMPLETED | OUTPATIENT
Start: 2019-05-26 | End: 2019-05-26

## 2019-05-26 RX ORDER — ONDANSETRON 4 MG/1
4 TABLET, ORALLY DISINTEGRATING ORAL EVERY 4 HOURS PRN
Status: DISCONTINUED | OUTPATIENT
Start: 2019-05-26 | End: 2019-05-27 | Stop reason: HOSPADM

## 2019-05-26 RX ORDER — AMOXICILLIN 250 MG
2 CAPSULE ORAL 2 TIMES DAILY
Status: DISCONTINUED | OUTPATIENT
Start: 2019-05-26 | End: 2019-05-27 | Stop reason: HOSPADM

## 2019-05-26 RX ORDER — POLYETHYLENE GLYCOL 3350 17 G/17G
1 POWDER, FOR SOLUTION ORAL
Status: DISCONTINUED | OUTPATIENT
Start: 2019-05-26 | End: 2019-05-27 | Stop reason: HOSPADM

## 2019-05-26 RX ORDER — BISACODYL 10 MG
10 SUPPOSITORY, RECTAL RECTAL
Status: DISCONTINUED | OUTPATIENT
Start: 2019-05-26 | End: 2019-05-27 | Stop reason: HOSPADM

## 2019-05-26 RX ORDER — IBUPROFEN 600 MG/1
600 TABLET ORAL ONCE
Status: COMPLETED | OUTPATIENT
Start: 2019-05-26 | End: 2019-05-26

## 2019-05-26 RX ORDER — ESCITALOPRAM OXALATE 10 MG/1
10 TABLET ORAL DAILY
Status: DISCONTINUED | OUTPATIENT
Start: 2019-05-27 | End: 2019-05-27 | Stop reason: HOSPADM

## 2019-05-26 RX ORDER — LEVETIRACETAM 500 MG/1
1000 TABLET, FILM COATED, EXTENDED RELEASE ORAL EVERY EVENING
COMMUNITY
End: 2019-08-08 | Stop reason: SDUPTHER

## 2019-05-26 RX ORDER — CALCIUM CARBONATE 500(1250)
500 TABLET ORAL EVERY MORNING
COMMUNITY
End: 2020-07-03

## 2019-05-26 RX ORDER — LEVETIRACETAM 500 MG/1
500 TABLET ORAL 2 TIMES DAILY
Status: DISCONTINUED | OUTPATIENT
Start: 2019-05-27 | End: 2019-05-27 | Stop reason: HOSPADM

## 2019-05-26 RX ADMIN — DONEPEZIL HYDROCHLORIDE 10 MG: 5 TABLET, FILM COATED ORAL at 23:46

## 2019-05-26 RX ADMIN — IBUPROFEN 600 MG: 600 TABLET ORAL at 22:30

## 2019-05-26 RX ADMIN — SODIUM CHLORIDE 1000 ML: 9 INJECTION, SOLUTION INTRAVENOUS at 21:44

## 2019-05-26 RX ADMIN — RIVAROXABAN 20 MG: 20 TABLET, FILM COATED ORAL at 23:46

## 2019-05-26 ASSESSMENT — LIFESTYLE VARIABLES: EVER_SMOKED: NEVER

## 2019-05-26 ASSESSMENT — COPD QUESTIONNAIRES
COPD SCREENING SCORE: 2
DURING THE PAST 4 WEEKS HOW MUCH DID YOU FEEL SHORT OF BREATH: NONE/LITTLE OF THE TIME
DO YOU EVER COUGH UP ANY MUCUS OR PHLEGM?: NO/ONLY WITH OCCASIONAL COLDS OR INFECTIONS
HAVE YOU SMOKED AT LEAST 100 CIGARETTES IN YOUR ENTIRE LIFE: NO/DON'T KNOW
IN THE PAST 12 MONTHS DO YOU DO LESS THAN YOU USED TO BECAUSE OF YOUR BREATHING PROBLEMS: DISAGREE/UNSURE

## 2019-05-26 ASSESSMENT — COGNITIVE AND FUNCTIONAL STATUS - GENERAL
DAILY ACTIVITIY SCORE: 24
MOBILITY SCORE: 24
SUGGESTED CMS G CODE MODIFIER MOBILITY: CH
SUGGESTED CMS G CODE MODIFIER DAILY ACTIVITY: CH

## 2019-05-26 ASSESSMENT — CHA2DS2 SCORE
CHA2DS2 VASC SCORE: 4
DIABETES: NO
PRIOR STROKE OR TIA OR THROMBOEMBOLISM: YES
HYPERTENSION: NO
AGE 65 TO 74: YES
VASCULAR DISEASE: NO
SEX: FEMALE
AGE 75 OR GREATER: NO
CHF OR LEFT VENTRICULAR DYSFUNCTION: NO

## 2019-05-27 ENCOUNTER — PATIENT OUTREACH (OUTPATIENT)
Dept: HEALTH INFORMATION MANAGEMENT | Facility: OTHER | Age: 69
End: 2019-05-27

## 2019-05-27 ENCOUNTER — APPOINTMENT (OUTPATIENT)
Dept: RADIOLOGY | Facility: MEDICAL CENTER | Age: 69
End: 2019-05-27
Attending: HOSPITALIST
Payer: MEDICARE

## 2019-05-27 VITALS
TEMPERATURE: 98.8 F | HEIGHT: 66 IN | SYSTOLIC BLOOD PRESSURE: 119 MMHG | DIASTOLIC BLOOD PRESSURE: 60 MMHG | WEIGHT: 126.1 LBS | OXYGEN SATURATION: 96 % | BODY MASS INDEX: 20.27 KG/M2 | RESPIRATION RATE: 16 BRPM | HEART RATE: 70 BPM

## 2019-05-27 PROBLEM — R79.89 ABNORMAL LFTS: Status: RESOLVED | Noted: 2019-05-26 | Resolved: 2019-05-27

## 2019-05-27 PROBLEM — E87.1 HYPONATREMIA: Status: RESOLVED | Noted: 2018-08-01 | Resolved: 2019-05-27

## 2019-05-27 PROBLEM — G40.209 COMPLEX PARTIAL SEIZURES WITH IMPAIRED CONSCIOUSNESS AT ONSET (HCC): Status: RESOLVED | Noted: 2018-08-03 | Resolved: 2019-05-27

## 2019-05-27 LAB
ALBUMIN SERPL BCP-MCNC: 3.6 G/DL (ref 3.2–4.9)
ALBUMIN/GLOB SERPL: 1.8 G/DL
ALP SERPL-CCNC: 92 U/L (ref 30–99)
ALT SERPL-CCNC: 56 U/L (ref 2–50)
ANION GAP SERPL CALC-SCNC: 5 MMOL/L (ref 0–11.9)
ANION GAP SERPL CALC-SCNC: 5 MMOL/L (ref 0–11.9)
ANION GAP SERPL CALC-SCNC: 6 MMOL/L (ref 0–11.9)
AST SERPL-CCNC: 47 U/L (ref 12–45)
BASOPHILS # BLD AUTO: 0.5 % (ref 0–1.8)
BASOPHILS # BLD: 0.03 K/UL (ref 0–0.12)
BILIRUB SERPL-MCNC: 0.7 MG/DL (ref 0.1–1.5)
BUN SERPL-MCNC: 10 MG/DL (ref 8–22)
BUN SERPL-MCNC: 9 MG/DL (ref 8–22)
BUN SERPL-MCNC: 9 MG/DL (ref 8–22)
CALCIUM SERPL-MCNC: 8.4 MG/DL (ref 8.5–10.5)
CALCIUM SERPL-MCNC: 8.4 MG/DL (ref 8.5–10.5)
CALCIUM SERPL-MCNC: 8.7 MG/DL (ref 8.5–10.5)
CHLORIDE SERPL-SCNC: 110 MMOL/L (ref 96–112)
CHLORIDE SERPL-SCNC: 111 MMOL/L (ref 96–112)
CHLORIDE SERPL-SCNC: 111 MMOL/L (ref 96–112)
CO2 SERPL-SCNC: 24 MMOL/L (ref 20–33)
CO2 SERPL-SCNC: 25 MMOL/L (ref 20–33)
CO2 SERPL-SCNC: 26 MMOL/L (ref 20–33)
CREAT SERPL-MCNC: 0.56 MG/DL (ref 0.5–1.4)
CREAT SERPL-MCNC: 0.57 MG/DL (ref 0.5–1.4)
CREAT SERPL-MCNC: 0.64 MG/DL (ref 0.5–1.4)
EOSINOPHIL # BLD AUTO: 0.04 K/UL (ref 0–0.51)
EOSINOPHIL NFR BLD: 0.6 % (ref 0–6.9)
ERYTHROCYTE [DISTWIDTH] IN BLOOD BY AUTOMATED COUNT: 42.4 FL (ref 35.9–50)
GLOBULIN SER CALC-MCNC: 2 G/DL (ref 1.9–3.5)
GLUCOSE SERPL-MCNC: 102 MG/DL (ref 65–99)
GLUCOSE SERPL-MCNC: 102 MG/DL (ref 65–99)
GLUCOSE SERPL-MCNC: 137 MG/DL (ref 65–99)
HAV IGM SERPL QL IA: NEGATIVE
HBV CORE IGM SER QL: NEGATIVE
HBV SURFACE AG SER QL: NEGATIVE
HCT VFR BLD AUTO: 34.6 % (ref 37–47)
HCV AB SER QL: NEGATIVE
HGB BLD-MCNC: 11.6 G/DL (ref 12–16)
IMM GRANULOCYTES # BLD AUTO: 0.01 K/UL (ref 0–0.11)
IMM GRANULOCYTES NFR BLD AUTO: 0.2 % (ref 0–0.9)
LYMPHOCYTES # BLD AUTO: 1.7 K/UL (ref 1–4.8)
LYMPHOCYTES NFR BLD: 26.7 % (ref 22–41)
MCH RBC QN AUTO: 32 PG (ref 27–33)
MCHC RBC AUTO-ENTMCNC: 33.5 G/DL (ref 33.6–35)
MCV RBC AUTO: 95.3 FL (ref 81.4–97.8)
MONOCYTES # BLD AUTO: 0.51 K/UL (ref 0–0.85)
MONOCYTES NFR BLD AUTO: 8 % (ref 0–13.4)
NEUTROPHILS # BLD AUTO: 4.08 K/UL (ref 2–7.15)
NEUTROPHILS NFR BLD: 64 % (ref 44–72)
NRBC # BLD AUTO: 0 K/UL
NRBC BLD-RTO: 0 /100 WBC
PLATELET # BLD AUTO: 218 K/UL (ref 164–446)
PMV BLD AUTO: 10.5 FL (ref 9–12.9)
POTASSIUM SERPL-SCNC: 3.7 MMOL/L (ref 3.6–5.5)
POTASSIUM SERPL-SCNC: 3.8 MMOL/L (ref 3.6–5.5)
POTASSIUM SERPL-SCNC: 3.9 MMOL/L (ref 3.6–5.5)
PROT SERPL-MCNC: 5.6 G/DL (ref 6–8.2)
RBC # BLD AUTO: 3.63 M/UL (ref 4.2–5.4)
SODIUM SERPL-SCNC: 140 MMOL/L (ref 135–145)
SODIUM SERPL-SCNC: 141 MMOL/L (ref 135–145)
SODIUM SERPL-SCNC: 142 MMOL/L (ref 135–145)
WBC # BLD AUTO: 6.4 K/UL (ref 4.8–10.8)

## 2019-05-27 PROCEDURE — 99217 PR OBSERVATION CARE DISCHARGE: CPT | Performed by: HOSPITALIST

## 2019-05-27 PROCEDURE — A9270 NON-COVERED ITEM OR SERVICE: HCPCS | Performed by: HOSPITALIST

## 2019-05-27 PROCEDURE — 80053 COMPREHEN METABOLIC PANEL: CPT

## 2019-05-27 PROCEDURE — 76705 ECHO EXAM OF ABDOMEN: CPT

## 2019-05-27 PROCEDURE — 80048 BASIC METABOLIC PNL TOTAL CA: CPT | Mod: 91

## 2019-05-27 PROCEDURE — 36415 COLL VENOUS BLD VENIPUNCTURE: CPT

## 2019-05-27 PROCEDURE — G0378 HOSPITAL OBSERVATION PER HR: HCPCS

## 2019-05-27 PROCEDURE — 700102 HCHG RX REV CODE 250 W/ 637 OVERRIDE(OP): Performed by: HOSPITALIST

## 2019-05-27 PROCEDURE — 85025 COMPLETE CBC W/AUTO DIFF WBC: CPT

## 2019-05-27 RX ADMIN — ESCITALOPRAM OXALATE 10 MG: 10 TABLET ORAL at 05:03

## 2019-05-27 RX ADMIN — SENNOSIDES, DOCUSATE SODIUM 2 TABLET: 50; 8.6 TABLET, FILM COATED ORAL at 05:03

## 2019-05-27 RX ADMIN — LEVETIRACETAM 500 MG: 500 TABLET ORAL at 05:03

## 2019-05-27 NOTE — ED PROVIDER NOTES
ED Provider Note    CHIEF COMPLAINT  Chief Complaint   Patient presents with   • Near Syncopal   • Weakness   • Diarrhea   • Shaking       HPI  Azul Milan is a 68 y.o. female here for evaluation of near syncopal and generalized weakness.  The patient is here with her family, and states that earlier today she had 2 episodes of near syncope where she was up doing things around the house, and became lightheaded and dizzy.  On one instance she went over and sat down, and then later back as if she passed out, however she did came right back around.  Patient does have history of the same, and the remote past last year, and this was all due to her low sodium level.  She states that she does salt her food, and has been doing some more often secondary to always having low sodium.  She was seen and evaluated, and had a work-up for hyponatremia with neurology    PAST MEDICAL HISTORY   has a past medical history of A-fib (HCC); Hypokalemia; Hypotension; and Syncope.    SOCIAL HISTORY  Social History     Social History Main Topics   • Smoking status: Never Smoker   • Smokeless tobacco: Never Used   • Alcohol use No   • Drug use: No   • Sexual activity: Yes     Partners: Male       Family History  No bleeding disorders    SURGICAL HISTORY   has a past surgical history that includes tonsillectomy; tubal coagulation laparoscopic bilateral; colonoscopy; and endoscopy.    CURRENT MEDICATIONS  Home Medications     Reviewed by Primo Arroyo R.N. (Registered Nurse) on 05/26/19 at 2040  Med List Status: Not Addressed   Medication Last Dose Status   B Complex Vitamins (B COMPLEX 1 PO)  Active   CALCIUM/MAGNESIUM/ZINC FORMULA PO  Active   donepezil (ARICEPT) 10 MG tablet  Active   escitalopram (LEXAPRO) 10 MG Tab  Active   levetiracetam (KEPPRA) 500 MG TABLET SR 24 HR  Active   Multiple Vitamins-Minerals (OCUVITE PO)  Active   multivitamin (THERAGRAN) Tab  Active   Omega-3 Fatty Acids (FISH OIL) 1000 MG Cap capsule   Active   rivaroxaban (XARELTO) 20 MG Tab tablet  Active   Vitamin D, Cholecalciferol, 1000 UNITS Cap  Active   Zinc 50 MG Tab  Active                ALLERGIES  Allergies   Allergen Reactions   • Other Food      Corn   • Penicillins Rash     Minor RASH       REVIEW OF SYSTEMS  See HPI for further details. Review of systems as above, otherwise all other systems are negative.     PHYSICAL EXAM  Constitutional: Well developed, well nourished. No acute distress.  HEENT: Normocephalic, atraumatic. Posterior pharynx clear and moist.  Eyes:  EOMI. Normal sclera.  Neck: Supple, Full range of motion, nontender.  Chest/Pulmonary: clear to ausculation. Symmetrical expansion.   Cardio: Regular rate and rhythm with no murmur.   Abdomen: Soft, nontender. No peritoneal signs. No guarding. No palpable masses.  Musculoskeletal: No deformity, +1 edema, neurovascular intact.   Neuro: Clear speech, appropriate, cooperative, cranial nerves II-XII grossly intact.  Steady, unassisted gait  Psych: Normal mood and affect    PROCEDURES     MEDICAL RECORD  I have reviewed patient's medical record and pertinent results are listed above.    COURSE & MEDICAL DECISION MAKING  I have reviewed any medical record information, laboratory studies and radiographic results as noted above.    Results for orders placed or performed during the hospital encounter of 05/26/19   CBC with Differential   Result Value Ref Range    WBC 6.4 4.8 - 10.8 K/uL    RBC 4.16 (L) 4.20 - 5.40 M/uL    Hemoglobin 13.1 12.0 - 16.0 g/dL    Hematocrit 39.5 37.0 - 47.0 %    MCV 95.0 81.4 - 97.8 fL    MCH 31.5 27.0 - 33.0 pg    MCHC 33.2 (L) 33.6 - 35.0 g/dL    RDW 42.8 35.9 - 50.0 fL    Platelet Count 243 164 - 446 K/uL    MPV 10.4 9.0 - 12.9 fL    Neutrophils-Polys 67.30 44.00 - 72.00 %    Lymphocytes 24.50 22.00 - 41.00 %    Monocytes 6.90 0.00 - 13.40 %    Eosinophils 0.50 0.00 - 6.90 %    Basophils 0.50 0.00 - 1.80 %    Immature Granulocytes 0.30 0.00 - 0.90 %    Nucleated  RBC 0.00 /100 WBC    Neutrophils (Absolute) 4.31 2.00 - 7.15 K/uL    Lymphs (Absolute) 1.57 1.00 - 4.80 K/uL    Monos (Absolute) 0.44 0.00 - 0.85 K/uL    Eos (Absolute) 0.03 0.00 - 0.51 K/uL    Baso (Absolute) 0.03 0.00 - 0.12 K/uL    Immature Granulocytes (abs) 0.02 0.00 - 0.11 K/uL    NRBC (Absolute) 0.00 K/uL   Complete Metabolic Panel (CMP)   Result Value Ref Range    Sodium 127 (L) 135 - 145 mmol/L    Potassium 3.9 3.6 - 5.5 mmol/L    Chloride 97 96 - 112 mmol/L    Co2 24 20 - 33 mmol/L    Anion Gap 6.0 0.0 - 11.9    Glucose 142 (H) 65 - 99 mg/dL    Bun 13 8 - 22 mg/dL    Creatinine 0.59 0.50 - 1.40 mg/dL    Calcium 9.1 8.5 - 10.5 mg/dL    AST(SGOT) 81 (H) 12 - 45 U/L    ALT(SGPT) 79 (H) 2 - 50 U/L    Alkaline Phosphatase 124 (H) 30 - 99 U/L    Total Bilirubin 0.5 0.1 - 1.5 mg/dL    Albumin 4.6 3.2 - 4.9 g/dL    Total Protein 6.7 6.0 - 8.2 g/dL    Globulin 2.1 1.9 - 3.5 g/dL    A-G Ratio 2.2 g/dL   Troponin   Result Value Ref Range    Troponin I <0.01 0.00 - 0.04 ng/mL   URINALYSIS CULTURE, IF INDICATED   Result Value Ref Range    Color Yellow     Character Clear     Specific Gravity 1.005 <1.035    Ph 6.5 5.0 - 8.0    Glucose Negative Negative mg/dL    Ketones Negative Negative mg/dL    Protein Negative Negative mg/dL    Bilirubin Negative Negative    Urobilinogen, Urine 0.2 Negative    Nitrite Negative Negative    Leukocyte Esterase Trace (A) Negative    Occult Blood Negative Negative    Micro Urine Req Microscopic    ESTIMATED GFR   Result Value Ref Range    GFR If African American >60 >60 mL/min/1.73 m 2    GFR If Non African American >60 >60 mL/min/1.73 m 2   URINE MICROSCOPIC (W/UA)   Result Value Ref Range    WBC 0-2 /hpf    RBC 0-2 /hpf    Bacteria Negative None /hpf    Epithelial Cells Negative /hpf    Hyaline Cast 0-2 /lpf   OSMOLALITY SERUM   Result Value Ref Range    Osmolality Serum 280 278 - 298 mOsm/kg H2O   EKG   Result Value Ref Range    Report       [facility]  [facility]    Test Date:  2019  Pt Name:    KASH MAIN            Department: ER  MRN:        [id number]                      Room:  Gender:     Female                       Technician: [id number]  :        [id number]                   Requested By:ER TRIAGE PROTOCOL  Order #:    [id number]                    Reading MD:    Measurements  Intervals                                Axis  Rate:       63                           P:          65  MS:         200                          QRS:        35  QRSD:       108                          T:          35  QT:         408  QTc:        418    Interpretive Statements  SINUS RHYTHM  PROBABLE LEFT ATRIAL ABNORMALITY  BORDERLINE INTRAVENTRICULAR CONDUCTION DELAY  RSR' IN V1 OR V2, RIGHT VCD OR RVH  Compared to ECG 2018 22:10:14  Right ventricular hypertrophy now present  RSR' in V1 or V2 now present  First degree AV block no longer present        DX-CHEST-PORTABLE (1 VIEW)   Final Result      1.  There is no acute cardiopulmonary process.        Ekg:  nsr 63.  No st elevation, no st depression, no ectopy. rsr noted.     HYDRATION: Based on the patient's presentation of Dehydration the patient was given IV fluids. IV Hydration was used because oral hydration was not adequate alone. Upon recheck following hydration, the patient was improved.     The pt will be admitted to Dr. Ott.  Family requests bed close to nurses station, to prevent elopement of pt, secondary to her mild dementia.  He is aware.       FINAL IMPRESSION  Near syncope  Hyponatremia       Electronically signed by: Jono De Leon, 2019 9:53 PM

## 2019-05-27 NOTE — ED TRIAGE NOTES
69 y/o female ambulatory to triage with c/o multiple near syncopal episodes along with generalized weakness and shaking. Pt also c/o multiple bouts of diarrhea today. EKG done prior to triage.

## 2019-05-27 NOTE — PROGRESS NOTES
Received report from night shift RN Judith. Assumed care of patient. Patient is SR on the monitor at this time. Patient is sitting up in bed with no family present at bedside at this time. Patient declines any needs at this time. Plan of care discussed with patient. Bed locked and in the lowest position with call light within reach.

## 2019-05-27 NOTE — H&P
Hospital Medicine History & Physical Note    Date of Service  5/26/2019    Primary Care Physician  Rachelle Isaac M.D.    Consultants  none    Code Status  full    Chief Complaint  Weakness     History of Presenting Illness  68 y.o. female who presented 5/26/2019 with past medical history of seizures, previous history of hyponatremia, early onset Alzheimer's dementia, atrial fibrillation on Xarelto presents with weakness.  This patient had generalized weakness and some shaking.  She also had a few bouts of diarrhea today.  She is also had episodes where she feels lightheaded.  Here in the emergency department she is found to be hyponatremic.  She will be admitted to the hospital for further management.  She does have a history of early onset dementia and history is slightly limited secondary to this.    Review of Systems  Review of Systems   Unable to perform ROS: Dementia       Past Medical History   has a past medical history of A-fib (HCC); Hypokalemia; Hypotension; and Syncope.    Surgical History   has a past surgical history that includes tonsillectomy; tubal coagulation laparoscopic bilateral; colonoscopy; and endoscopy.     Family History  family history includes Cancer in her sister; Dementia in her mother; Lung Disease in her paternal grandfather; Other in her brother.     Social History   reports that she has never smoked. She has never used smokeless tobacco. She reports that she does not drink alcohol or use drugs.    Allergies  Allergies   Allergen Reactions   • Other Food      Corn   • Penicillins Rash     Minor RASH       Medications  Prior to Admission Medications   Prescriptions Last Dose Informant Patient Reported? Taking?   B Complex Vitamins (B COMPLEX 1 PO)  Patient Yes No   Sig: Take 1 Cap by mouth every day at 6 PM.   CALCIUM/MAGNESIUM/ZINC FORMULA PO   Yes No   Sig: Take 1 Tab by mouth every day.   Multiple Vitamins-Minerals (OCUVITE PO)  Patient Yes No   Sig: Take 1 Tab by mouth every day.    Omega-3 Fatty Acids (FISH OIL) 1000 MG Cap capsule  Patient Yes No   Sig: Take 1,000 mg by mouth every day.   Vitamin D, Cholecalciferol, 1000 UNITS Cap  Patient Yes No   Sig: Take 1,000 Units by mouth every day.   Zinc 50 MG Tab  Patient Yes No   Sig: Take 50 mg by mouth every day.   donepezil (ARICEPT) 10 MG tablet   No No   Sig: Take 1 Tab by mouth every evening.   escitalopram (LEXAPRO) 10 MG Tab   No No   Sig: Take 1 Tab by mouth every day.   levetiracetam (KEPPRA) 500 MG TABLET SR 24 HR   No No   Sig: TAKE 1 TAB BY MOUTH AT BEDTIME FOR 2 WEEKS, THEN TAKE 2 AT BEDTIME   multivitamin (THERAGRAN) Tab  Patient Yes No   Sig: Take 1 Tab by mouth every day.   rivaroxaban (XARELTO) 20 MG Tab tablet   No No   Sig: Take 1 Tab by mouth with dinner.      Facility-Administered Medications: None       Physical Exam  Temp:  [36.7 °C (98 °F)] 36.7 °C (98 °F)  Pulse:  [67] 67  Resp:  [18] 18  BP: (151)/(81) 151/81  SpO2:  [97 %] 97 %    Physical Exam   Constitutional: She appears well-developed and well-nourished. No distress.   HENT:   Head: Normocephalic and atraumatic.   Moist oral mucosa   Eyes: Pupils are equal, round, and reactive to light. Conjunctivae and EOM are normal.   Neck: Normal range of motion. Neck supple. No JVD present.   Cardiovascular: Normal rate, regular rhythm, normal heart sounds and intact distal pulses.    No murmur heard.  Pulmonary/Chest: Effort normal and breath sounds normal. No respiratory distress. She has no wheezes.   Abdominal: Soft. Bowel sounds are normal. She exhibits no distension. There is no tenderness.   Musculoskeletal: Normal range of motion. She exhibits no edema.   Neurological: She displays normal reflexes. She exhibits normal muscle tone.   Skin: Skin is warm and dry.   Psychiatric: She has a normal mood and affect. Her behavior is normal. Judgment and thought content normal.   Nursing note and vitals reviewed.      Laboratory:  Recent Labs      05/26/19   1903   WBC  6.4    RBC  4.16*   HEMOGLOBIN  13.1   HEMATOCRIT  39.5   MCV  95.0   MCH  31.5   MCHC  33.2*   RDW  42.8   PLATELETCT  243   MPV  10.4     Recent Labs      05/26/19 1903   SODIUM  127*   POTASSIUM  3.9   CHLORIDE  97   CO2  24   GLUCOSE  142*   BUN  13   CREATININE  0.59   CALCIUM  9.1     Recent Labs      05/26/19 1903   ALTSGPT  79*   ASTSGOT  81*   ALKPHOSPHAT  124*   TBILIRUBIN  0.5   GLUCOSE  142*                 Recent Labs      05/26/19 1903   TROPONINI  <0.01       Urinalysis:    Recent Labs      05/26/19 1914   SPECGRAVITY  1.005   GLUCOSEUR  Negative   KETONES  Negative   NITRITE  Negative   LEUKESTERAS  Trace*   WBCURINE  0-2   RBCURINE  0-2   BACTERIA  Negative   EPITHELCELL  Negative        Imaging:  DX-CHEST-PORTABLE (1 VIEW)   Final Result      1.  There is no acute cardiopulmonary process.            Assessment/Plan:  I anticipate this patient is appropriate for observation status at this time.    * Hyponatremia- (present on admission)   Assessment & Plan    With associated weakness, shakes and lightheadedness   History of SIADH but was unclear per Nephrology last note  Urine and serum osmo, urine electrolytes sent   She appears euvolemic   Neuro checks   Serial BMP   Volume restriction to 1L and trial salt tabs      Complex partial seizures with impaired consciousness at onset (HCC)- (present on admission)   Assessment & Plan    Resume home keppra  Seizure precautions      Early onset Alzheimer's dementia without behavioral disturbance- (present on admission)   Assessment & Plan    Resume home aricept   At baseline mental status     Abnormal LFTs   Assessment & Plan    Unclear etiology   No pain   Hepatitis panel and ruq ultrasound      Paroxysmal atrial fibrillation (HCC)- (present on admission)   Assessment & Plan    History of not on anticoagulation          VTE prophylaxis: heparin

## 2019-05-27 NOTE — ED NOTES
"Family ambulatory from [facility] to Triage, asking this RN about wait times. Apologized for wait times and updated family regarding current wait times in the department. Per family patient with hx. demetia and sundowning - starting to get slightly agitated stating \"Why am I waiting here so long? What's going on? I want to get out of here.\" Apologized again to family and returned to [facility].  "

## 2019-05-27 NOTE — PROGRESS NOTES
Assumed care at 2240, bedside report received from day shift RN. Pt on the monitor. Initial assessment completed, orders reviewed, call light within reach, bed alarm in use, and hourly rounding in place. POC addressed with patient, no additional questions at this time.

## 2019-05-27 NOTE — PROGRESS NOTES
2 RN skin check completed with LIYA Wong.  Devices in place PIV.  Skin assessed under devices: yes.  Confirmed pressure ulcers found on: none.  New potential pressure ulcers noted on: none. Wound consult placed: no.  The following interventions in place: clean and dry linen on hospital bed, pt able to turn and reposition self, educated and encouraged regarding ROM and early ambulation with assistance.    All skin clear, dry and intact.

## 2019-05-27 NOTE — DISCHARGE INSTRUCTIONS
Discharge Instructions    Discharged to home by car with relative. Discharged via wheelchair, hospital escort: Yes.  Special equipment needed: Not Applicable    Be sure to schedule a follow-up appointment with your primary care doctor or any specialists as instructed.     Discharge Plan:   Activity Level: Discussed  Confirmed Follow up Appointment: Patient to Call and Schedule Appointment  Confirmed Symptoms Management: Discussed  Medication Reconciliation Updated: Yes  Influenza Vaccine Indication: Not indicated: Previously immunized this influenza season and > 8 years of age    I understand that a diet low in cholesterol, fat, and sodium is recommended for good health. Unless I have been given specific instructions below for another diet, I accept this instruction as my diet prescription.   Other diet: Regular    Special Instructions: None    · Is patient discharged on Warfarin / Coumadin?   No         Hyponatremia  Introduction  Hyponatremia is when the amount of salt (sodium) in your blood is too low. When salt levels are low, your cells absorb extra water and they swell. The swelling happens throughout the body, but it mostly affects the brain.  Follow these instructions at home:  · Take medicines only as told by your doctor. Many medicines can make this condition worse. Talk with your doctor about any medicines that you are currently taking.  · Carefully follow a recommended diet as told by your doctor.  · Carefully follow instructions from your doctor about fluid restrictions.  · Keep all follow-up visits as told by your doctor. This is important.  · Do not drink alcohol.  Contact a doctor if:  · You feel sicker to your stomach (nauseous).  · You feel more confused.  · You feel more tired (fatigued).  · Your headache gets worse.  · You feel weaker.  · Your symptoms go away and then they come back.  · You have trouble following the diet instructions.  Get help right away if:  · You start to twitch and shake  (have a seizure).  · You pass out (faint).  · You keep having watery poop (diarrhea).  · You keep throwing up (vomiting).  This information is not intended to replace advice given to you by your health care provider. Make sure you discuss any questions you have with your health care provider.  Document Released: 08/29/2012 Document Revised: 05/25/2017 Document Reviewed: 12/14/2015  © 2017 Elsevier    Depression / Suicide Risk    As you are discharged from this [facility] facility, it is important to learn how to keep safe from harming yourself.    Recognize the warning signs:  · Abrupt changes in personality, positive or negative- including increase in energy   · Giving away possessions  · Change in eating patterns- significant weight changes-  positive or negative  · Change in sleeping patterns- unable to sleep or sleeping all the time   · Unwillingness or inability to communicate  · Depression  · Unusual sadness, discouragement and loneliness  · Talk of wanting to die  · Neglect of personal appearance   · Rebelliousness- reckless behavior  · Withdrawal from people/activities they love  · Confusion- inability to concentrate     If you or a loved one observes any of these behaviors or has concerns about self-harm, here's what you can do:  · Talk about it- your feelings and reasons for harming yourself  · Remove any means that you might use to hurt yourself (examples: pills, rope, extension cords, firearm)  · Get professional help from the community (Mental Health, Substance Abuse, psychological counseling)  · Do not be alone:Call your Safe Contact- someone whom you trust who will be there for you.  · Call your local CRISIS HOTLINE 949-5644 or 428-675-3955  · Call your local Children's Mobile Crisis Response Team [place] (256) 242-3998 or www.Get [employer]  · Call the toll free National Suicide Prevention Hotlines   · National Suicide Prevention Lifeline 514-351-HGNC (8068)  · National [employer] Line Network  800-SUICIDE (432-4806)

## 2019-05-27 NOTE — CARE PLAN
Problem: Communication  Goal: The ability to communicate needs accurately and effectively will improve  Outcome: PROGRESSING AS EXPECTED    Intervention: Rives patient and significant other/support system to call light to alert staff of needs  Patient oriented to call light system and has been able to communicate needs accurately and effectively.      Problem: Safety  Goal: Will remain free from falls  Outcome: PROGRESSING AS EXPECTED    Intervention: Assess risk factors for falls  Patient has been assessed for fall risks and fall precautions have been put in place.

## 2019-05-27 NOTE — ASSESSMENT & PLAN NOTE
With associated weakness, shakes and lightheadedness   History of SIADH but was unclear per Nephrology last note  Urine and serum osmo, urine electrolytes sent   She appears euvolemic   Neuro checks   Serial BMP   Volume restriction to 1L and trial salt tabs

## 2019-05-27 NOTE — PROGRESS NOTES
Patient discharged. Patient removed from tele monitor, monitor room notified, monitor returned to the front. Patient and spouse verbalized understanding of all discharge instructions. Patient wheeled down to [facility] to meet spouse.

## 2019-05-27 NOTE — DISCHARGE SUMMARY
Discharge Summary    CHIEF COMPLAINT ON ADMISSION  Chief Complaint   Patient presents with   • Near Syncopal   • Weakness   • Diarrhea   • Shaking       Reason for Admission  Diahrreah/Weakness     Admission Date  5/26/2019    CODE STATUS  Full Code    HPI & HOSPITAL COURSE  This is a 68 y.o. female with past medical history of seizures, previous history of hyponatremia, early onset Alzheimer's dementia, atrial fibrillation on Xarelto presents with weakness.  This patient had generalized weakness and per her  some shaking (not seizure activity per ) and several episodes of diarrhea.  She was found to have mild dehydration and hyponatremia.  These resolved with iv hydration.  She had no further symptoms and no further diarrhea and per her  is at baseline.  She is ambulating at baseline and they would like to be discharged home.  They declined home health and agree to follow up with Dr. Isaac within one week. A repeat cmp is recommended to monitor sodium and minimally elevated ast and alt.  They agree to return to the er if needed.       Therefore, she is discharged in fair and stable condition to home with close outpatient follow-up.    The patient met 2-midnight criteria for an inpatient stay at the time of discharge.    Discharge Date  5/27/19    FOLLOW UP ITEMS POST DISCHARGE  pcp    DISCHARGE DIAGNOSES  Principal Problem (Resolved):    Hyponatremia POA: Yes  Active Problems:    Early onset Alzheimer's dementia without behavioral disturbance POA: Yes    Paroxysmal atrial fibrillation (HCC) POA: Yes    Abnormal LFTs POA: Unknown  Resolved Problems:    Complex partial seizures with impaired consciousness at onset (HCC) POA: Yes      FOLLOW UP  Future Appointments  Date Time Provider Department Center   8/16/2019 2:30 PM NEURODIAGNOSTIC LAB [facility] None   10/1/2019 9:00 AM [facility] None     Rachelle Isaac M.D.  [address]  [address]  [phone]    In 1 week        MEDICATIONS ON DISCHARGE     Medication List      ASK your doctor about these medications      Instructions   calcium carbonate 500 MG Tabs  Commonly known as:  OS-JEANNE 500   Take 500 mg by mouth 2 times a day, with meals.  Dose:  500 mg     donepezil 10 MG tablet  Commonly known as:  ARICEPT   Take 1 Tab by mouth every evening.  Dose:  10 mg     escitalopram 10 MG Tabs  Commonly known as:  LEXAPRO   Take 1 Tab by mouth every day.  Dose:  10 mg     fish oil 1000 MG Caps capsule   Take 1,000 mg by mouth every day.  Dose:  1000 mg     KEPPRA  MG Tb24  Generic drug:  levetiracetam   Take 1,000 mg by mouth every evening.  Dose:  1000 mg     OCUVITE EYE HEALTH FORMULA Caps   Take 1 Cap by mouth every day.  Dose:  1 Cap     rivaroxaban 20 MG Tabs tablet  Commonly known as:  XARELTO   Take 1 Tab by mouth with dinner.  Dose:  20 mg     Vitamin B Complex Tabs   Take 1 Tab by mouth every day.  Dose:  1 Tab     Vitamin D (Cholecalciferol) 1000 units Caps   Take 1,000 Units by mouth every day.  Dose:  1000 Units     Zinc 50 MG Tabs   Take 50 mg by mouth every day.  Dose:  50 mg            Allergies  Allergies   Allergen Reactions   • Other Food      Corn   • Penicillins Rash     Minor RASH  Received as a young child       DIET  Orders Placed This Encounter   Procedures   • Diet Order Renal     Standing Status:   Standing     Number of Occurrences:   1     Order Specific Question:   Diet:     Answer:   Renal [8]     Order Specific Question:   Consistency/Fluid modifications:     Answer:   1000 ml Fluid Restriction [7]       ACTIVITY  As tolerated.    CONSULTATIONS      PROCEDURES  US-RUQ   Final Result      1.  There is a trace of gallbladder sludge or debris.   2.  There is no cholelithiasis or biliary dilatation.   3.  Liver is unremarkable.      DX-CHEST-PORTABLE (1 VIEW)   Final Result      1.  There is no acute cardiopulmonary process.            LABORATORY  Lab Results    Component Value Date    SODIUM 140 05/27/2019    POTASSIUM 3.8 05/27/2019    CHLORIDE 110 05/27/2019    CO2 24 05/27/2019    GLUCOSE 102 (H) 05/27/2019    BUN 9 05/27/2019    CREATININE 0.56 05/27/2019        Lab Results   Component Value Date    WBC 6.4 05/27/2019    HEMOGLOBIN 11.6 (L) 05/27/2019    HEMATOCRIT 34.6 (L) 05/27/2019    PLATELETCT 218 05/27/2019        Total time of the discharge process exceeds 45 minutes.

## 2019-05-27 NOTE — ED NOTES
Pt to be transported upstairs. Tele RN notified. Pt updated on POC denies any needs at this time.

## 2019-05-27 NOTE — ED NOTES
Patient ambulatory from [facility] to  with steady gait accompanied by family under constant supervision by ED RN. Specimen container provided and instructed on clean catch urine sample - verbalized understanding. Urine specimen collected by patient sent to lab per protocol. Patient and family denied further needs. Apologized for wait time. Patient returned ambulatory to [facility] with steady gait accompanied by family under constant supervision by ED RN.

## 2019-05-27 NOTE — ED NOTES
Med rec updated and complete.  Allergies reviewed. Met with pt at bedside and dicussed current medications and last doses taken. Pt denies oral antibiotic use in last 30 days.

## 2019-05-29 ENCOUNTER — OFFICE VISIT (OUTPATIENT)
Dept: MEDICAL GROUP | Facility: PHYSICIAN GROUP | Age: 69
End: 2019-05-29
Payer: MEDICARE

## 2019-05-29 VITALS
BODY MASS INDEX: 18.36 KG/M2 | HEIGHT: 67 IN | RESPIRATION RATE: 12 BRPM | DIASTOLIC BLOOD PRESSURE: 58 MMHG | OXYGEN SATURATION: 96 % | WEIGHT: 117 LBS | HEART RATE: 74 BPM | TEMPERATURE: 99.1 F | SYSTOLIC BLOOD PRESSURE: 102 MMHG

## 2019-05-29 DIAGNOSIS — F03.90 DEMENTIA WITHOUT BEHAVIORAL DISTURBANCE, UNSPECIFIED DEMENTIA TYPE: ICD-10-CM

## 2019-05-29 DIAGNOSIS — R74.8 ELEVATED LIVER ENZYMES: ICD-10-CM

## 2019-05-29 DIAGNOSIS — E87.1 HYPONATREMIA: ICD-10-CM

## 2019-05-29 PROCEDURE — 99214 OFFICE O/P EST MOD 30 MIN: CPT | Performed by: FAMILY MEDICINE

## 2019-05-29 NOTE — PROGRESS NOTES
Chief Complaint   Patient presents with   • Other     Hyponatremia   • Dementia       HISTORY OF PRESENT ILLNESS: Patient is a 68 y.o. female established patient here today for the following concerns:    1. Hyponatremia  2. Elevated liver enzymes  3. Dementia without behavioral disturbance, unspecified dementia type    This is a pleasant 68-year-old female who approximately 2 years ago had a episode of loss of consciousness.  She is found to be hyponatremic and thought secondary to dehydration.  She had a second event and progressively thereafter has had cognitive decline.  She has history of A. fib and is anticoagulated with Xarelto.  She is followed by neurology and now geriatric neuro psychiatry.  She was recently hospitalized with another episode of syncope found to be hyponatremic, at which time she had been experiencing quite a bit of diarrhea..  Labs also demonstrated some mildly elevated liver enzymes.  Ultrasound of the right upper quadrant demonstrated no significant changes and no biliary disease.  Her medications have been adjusted including holding the Namenda.  She is been started on some Wellbutrin for depression related to her dementia and loss of independence.  She has follow-up including neuropsych testing with Dr. Carmen Westbrook in the near future.  Azul and her  are both very frustrated with the rapid onset of dementia.  She does have a family history of dementia in her mother.      Past Medical, Social, and Family history reviewed and updated in EPIC    Allergies:Other food and Penicillins    Current Outpatient Prescriptions   Medication Sig Dispense Refill   • B Complex Vitamins (VITAMIN B COMPLEX) Tab Take 1 Tab by mouth every day.     • calcium carbonate (OS-JEANNE 500) 500 MG Tab Take 500 mg by mouth 2 times a day, with meals.     • levetiracetam (KEPPRA XR) 500 MG TABLET SR 24 HR Take 1,000 mg by mouth every evening.     • Multiple Vitamins-Minerals (OCUVITE EYE HEALTH FORMULA)  "Cap Take 1 Cap by mouth every day.     • escitalopram (LEXAPRO) 10 MG Tab Take 1 Tab by mouth every day. 30 Tab 2   • rivaroxaban (XARELTO) 20 MG Tab tablet Take 1 Tab by mouth with dinner. 90 Tab 1   • donepezil (ARICEPT) 10 MG tablet Take 1 Tab by mouth every evening. 90 Tab 3   • Omega-3 Fatty Acids (FISH OIL) 1000 MG Cap capsule Take 1,000 mg by mouth every day.     • Zinc 50 MG Tab Take 50 mg by mouth every day.     • Vitamin D, Cholecalciferol, 1000 UNITS Cap Take 1,000 Units by mouth every day.       No current facility-administered medications for this visit.          ROS:  Review of Systems   Constitutional: Negative for fever, chills, weight loss and malaise/fatigue.   HENT: Negative for ear pain, nosebleeds, congestion, sore throat and neck pain.    Eyes: Negative for blurred vision.   Respiratory: Negative for cough, sputum production, shortness of breath and wheezing.    Cardiovascular: Negative for chest pain, palpitations,  and leg swelling.   Gastrointestinal: Negative for heartburn, nausea, vomiting, diarrhea and abdominal pain.   Genitourinary: Negative for dysuria, urgency and frequency.   Musculoskeletal: Negative for myalgias, back pain and joint pain.   Skin: Negative for rash and itching.   Neurological: Negative for dizziness, tingling, tremors, sensory change, focal weakness and headaches.   Endo/Heme/Allergies: Does not bruise/bleed easily.   Psychiatric/Behavioral: Negative for depression, anxiety, suicidal ideas, insomnia and memory loss.      Exam:  /58   Pulse 74   Temp 37.3 °C (99.1 °F)   Resp 12   Ht 1.702 m (5' 7\")   Wt 53.1 kg (117 lb)   SpO2 96%     General:  Well nourished, well developed in NAD  Head is grossly normal.  Eye exam: Limited exam shows no evidence of Marie Fleischer rings pupils are equal round reactive to light and accommodation  Neck: Supple without JVD   Pulmonary:  Normal effort.   Cardiovascular: Regular rate  Extremities: no clubbing, cyanosis, or " edema.  Psych: affect appropriate      Please note that this dictation was created using voice recognition software. I have made every reasonable attempt to correct obvious errors, but I expect that there are errors of grammar and possibly content that I did not discover before finalizing the note.    Assessment/Plan:  1. Hyponatremia  Unclear etiology for recurrent hyponatremia, possible dehydration.  No evidence of adrenal insufficiency.  Recheck levels in 1 week.  - Comp Metabolic Panel; Future  - CBC WITH DIFFERENTIAL; Future  - GAMMA GT (GGT); Future  - CERULOPLASMIN; Future    2. Elevated liver enzymes  Recheck liver enzymes in 1 week  - Comp Metabolic Panel; Future  - CBC WITH DIFFERENTIAL; Future  - GAMMA GT (GGT); Future  - CERULOPLASMIN; Future    3. Dementia without behavioral disturbance, unspecified dementia type  At this time likely early Alzheimer's dementia, we will check in the off chance for missing Freddy's disease, however given patient's frequent eye exams unlikely given no Marie Fleischer rings.      - CERULOPLASMIN; Future    Follow-up in 3 months sooner as needed

## 2019-06-03 ENCOUNTER — HOSPITAL ENCOUNTER (OUTPATIENT)
Dept: LAB | Facility: MEDICAL CENTER | Age: 69
End: 2019-06-03
Attending: FAMILY MEDICINE
Payer: MEDICARE

## 2019-06-03 DIAGNOSIS — E87.1 HYPONATREMIA: ICD-10-CM

## 2019-06-03 DIAGNOSIS — F03.90 DEMENTIA WITHOUT BEHAVIORAL DISTURBANCE, UNSPECIFIED DEMENTIA TYPE: ICD-10-CM

## 2019-06-03 DIAGNOSIS — R74.8 ELEVATED LIVER ENZYMES: ICD-10-CM

## 2019-06-03 LAB
ALBUMIN SERPL BCP-MCNC: 4.3 G/DL (ref 3.2–4.9)
ALBUMIN/GLOB SERPL: 1.5 G/DL
ALP SERPL-CCNC: 125 U/L (ref 30–99)
ALT SERPL-CCNC: 30 U/L (ref 2–50)
ANION GAP SERPL CALC-SCNC: 8 MMOL/L (ref 0–11.9)
AST SERPL-CCNC: 26 U/L (ref 12–45)
BASOPHILS # BLD AUTO: 0.8 % (ref 0–1.8)
BASOPHILS # BLD: 0.04 K/UL (ref 0–0.12)
BILIRUB SERPL-MCNC: 0.4 MG/DL (ref 0.1–1.5)
BUN SERPL-MCNC: 15 MG/DL (ref 8–22)
CALCIUM SERPL-MCNC: 9.5 MG/DL (ref 8.5–10.5)
CHLORIDE SERPL-SCNC: 103 MMOL/L (ref 96–112)
CO2 SERPL-SCNC: 28 MMOL/L (ref 20–33)
CREAT SERPL-MCNC: 0.74 MG/DL (ref 0.5–1.4)
EOSINOPHIL # BLD AUTO: 0.1 K/UL (ref 0–0.51)
EOSINOPHIL NFR BLD: 1.9 % (ref 0–6.9)
ERYTHROCYTE [DISTWIDTH] IN BLOOD BY AUTOMATED COUNT: 46.2 FL (ref 35.9–50)
GGT SERPL-CCNC: 109 U/L (ref 7–34)
GLOBULIN SER CALC-MCNC: 2.8 G/DL (ref 1.9–3.5)
GLUCOSE SERPL-MCNC: 95 MG/DL (ref 65–99)
HCT VFR BLD AUTO: 41.6 % (ref 37–47)
HGB BLD-MCNC: 13.3 G/DL (ref 12–16)
IMM GRANULOCYTES # BLD AUTO: 0.01 K/UL (ref 0–0.11)
IMM GRANULOCYTES NFR BLD AUTO: 0.2 % (ref 0–0.9)
LYMPHOCYTES # BLD AUTO: 1.96 K/UL (ref 1–4.8)
LYMPHOCYTES NFR BLD: 36.8 % (ref 22–41)
MCH RBC QN AUTO: 31.6 PG (ref 27–33)
MCHC RBC AUTO-ENTMCNC: 32 G/DL (ref 33.6–35)
MCV RBC AUTO: 98.8 FL (ref 81.4–97.8)
MONOCYTES # BLD AUTO: 0.48 K/UL (ref 0–0.85)
MONOCYTES NFR BLD AUTO: 9 % (ref 0–13.4)
NEUTROPHILS # BLD AUTO: 2.74 K/UL (ref 2–7.15)
NEUTROPHILS NFR BLD: 51.3 % (ref 44–72)
NRBC # BLD AUTO: 0 K/UL
NRBC BLD-RTO: 0 /100 WBC
PLATELET # BLD AUTO: 288 K/UL (ref 164–446)
PMV BLD AUTO: 11.1 FL (ref 9–12.9)
POTASSIUM SERPL-SCNC: 3.8 MMOL/L (ref 3.6–5.5)
PROT SERPL-MCNC: 7.1 G/DL (ref 6–8.2)
RBC # BLD AUTO: 4.21 M/UL (ref 4.2–5.4)
SODIUM SERPL-SCNC: 139 MMOL/L (ref 135–145)
WBC # BLD AUTO: 5.3 K/UL (ref 4.8–10.8)

## 2019-06-03 PROCEDURE — 85025 COMPLETE CBC W/AUTO DIFF WBC: CPT

## 2019-06-03 PROCEDURE — 82390 ASSAY OF CERULOPLASMIN: CPT

## 2019-06-03 PROCEDURE — 80053 COMPREHEN METABOLIC PANEL: CPT

## 2019-06-03 PROCEDURE — 36415 COLL VENOUS BLD VENIPUNCTURE: CPT

## 2019-06-03 PROCEDURE — 82977 ASSAY OF GGT: CPT

## 2019-06-05 ENCOUNTER — TELEPHONE (OUTPATIENT)
Dept: MEDICAL GROUP | Facility: PHYSICIAN GROUP | Age: 69
End: 2019-06-05

## 2019-06-05 DIAGNOSIS — R74.8 ELEVATED LIVER ENZYMES: ICD-10-CM

## 2019-06-05 LAB — CERULOPLASMIN SERPL-MCNC: 28 MG/DL (ref 17–54)

## 2019-06-06 NOTE — TELEPHONE ENCOUNTER
----- Message from Rachelle Isaac M.D. sent at 6/5/2019 12:56 PM PDT -----  The liver enzymes are still abnormal. I am placing a referral to GI for further work up

## 2019-06-12 ENCOUNTER — HOSPITAL ENCOUNTER (OUTPATIENT)
Dept: LAB | Facility: MEDICAL CENTER | Age: 69
End: 2019-06-12
Attending: INTERNAL MEDICINE
Payer: MEDICARE

## 2019-06-12 LAB
IRON SATN MFR SERPL: 50 % (ref 15–55)
IRON SERPL-MCNC: 205 UG/DL (ref 40–170)
TIBC SERPL-MCNC: 406 UG/DL (ref 250–450)

## 2019-06-12 PROCEDURE — 36415 COLL VENOUS BLD VENIPUNCTURE: CPT

## 2019-06-12 PROCEDURE — 86803 HEPATITIS C AB TEST: CPT

## 2019-06-12 PROCEDURE — 83516 IMMUNOASSAY NONANTIBODY: CPT | Mod: 91

## 2019-06-12 PROCEDURE — 82784 ASSAY IGA/IGD/IGG/IGM EACH: CPT

## 2019-06-12 PROCEDURE — 84075 ASSAY ALKALINE PHOSPHATASE: CPT

## 2019-06-12 PROCEDURE — 87340 HEPATITIS B SURFACE AG IA: CPT | Mod: GA

## 2019-06-12 PROCEDURE — 86708 HEPATITIS A ANTIBODY: CPT

## 2019-06-12 PROCEDURE — 82103 ALPHA-1-ANTITRYPSIN TOTAL: CPT

## 2019-06-12 PROCEDURE — 84443 ASSAY THYROID STIM HORMONE: CPT

## 2019-06-12 PROCEDURE — 82728 ASSAY OF FERRITIN: CPT

## 2019-06-12 PROCEDURE — 86704 HEP B CORE ANTIBODY TOTAL: CPT | Mod: GA

## 2019-06-12 PROCEDURE — 83550 IRON BINDING TEST: CPT

## 2019-06-12 PROCEDURE — 86706 HEP B SURFACE ANTIBODY: CPT | Mod: GA

## 2019-06-12 PROCEDURE — 86038 ANTINUCLEAR ANTIBODIES: CPT

## 2019-06-12 PROCEDURE — 84080 ASSAY ALKALINE PHOSPHATASES: CPT

## 2019-06-12 PROCEDURE — 83540 ASSAY OF IRON: CPT

## 2019-06-13 LAB
A1AT SERPL-MCNC: 132 MG/DL (ref 90–200)
FERRITIN SERPL-MCNC: 51.3 NG/ML (ref 10–291)
HAV AB SER QL IA: NEGATIVE
HBV CORE AB SERPL QL IA: NEGATIVE
HBV SURFACE AB SERPL IA-ACNC: <3.1 MIU/ML (ref 0–10)
HBV SURFACE AG SER QL: NEGATIVE
HCV AB SER QL: NEGATIVE
IGA SERPL-MCNC: 186 MG/DL (ref 68–408)
IGG SERPL-MCNC: 872 MG/DL (ref 768–1632)
MITOCHONDRIA M2 IGG SER-ACNC: 3.6 UNITS (ref 0–20)
NUCLEAR IGG SER QL IA: NORMAL
SMA IGG SER-ACNC: 9 UNITS (ref 0–19)

## 2019-06-14 LAB — GLIADIN PEPTIDE+TTG IGA+IGG SER QL IA: 13 UNITS (ref 0–19)

## 2019-06-15 LAB
ALP BONE SERPL-CCNC: 24 U/L (ref 0–55)
ALP ISOS SERPL HS-CCNC: 0 U/L
ALP LIVER SERPL-CCNC: 79 U/L (ref 0–94)
ALP SERPL-CCNC: 103 U/L (ref 40–120)

## 2019-06-18 LAB — TEST NAME 95000: NORMAL

## 2019-07-10 ENCOUNTER — APPOINTMENT (OUTPATIENT)
Dept: RADIOLOGY | Facility: MEDICAL CENTER | Age: 69
End: 2019-07-10
Attending: EMERGENCY MEDICINE
Payer: MEDICARE

## 2019-07-10 ENCOUNTER — HOSPITAL ENCOUNTER (EMERGENCY)
Facility: MEDICAL CENTER | Age: 69
End: 2019-07-10
Attending: EMERGENCY MEDICINE
Payer: MEDICARE

## 2019-07-10 VITALS
HEIGHT: 66 IN | OXYGEN SATURATION: 99 % | HEART RATE: 61 BPM | SYSTOLIC BLOOD PRESSURE: 119 MMHG | TEMPERATURE: 97 F | WEIGHT: 117.5 LBS | DIASTOLIC BLOOD PRESSURE: 64 MMHG | BODY MASS INDEX: 18.88 KG/M2 | RESPIRATION RATE: 19 BRPM

## 2019-07-10 DIAGNOSIS — R56.9 SEIZURE (HCC): ICD-10-CM

## 2019-07-10 LAB
ALBUMIN SERPL BCP-MCNC: 4 G/DL (ref 3.2–4.9)
ALBUMIN/GLOB SERPL: 1.5 G/DL
ALP SERPL-CCNC: 101 U/L (ref 30–99)
ALT SERPL-CCNC: 29 U/L (ref 2–50)
ANION GAP SERPL CALC-SCNC: 12 MMOL/L (ref 0–11.9)
APPEARANCE UR: CLEAR
AST SERPL-CCNC: 27 U/L (ref 12–45)
BASOPHILS # BLD AUTO: 0.6 % (ref 0–1.8)
BASOPHILS # BLD: 0.03 K/UL (ref 0–0.12)
BILIRUB SERPL-MCNC: 0.5 MG/DL (ref 0.1–1.5)
BILIRUB UR QL STRIP.AUTO: NEGATIVE
BUN SERPL-MCNC: 17 MG/DL (ref 8–22)
CALCIUM SERPL-MCNC: 9.3 MG/DL (ref 8.5–10.5)
CHLORIDE SERPL-SCNC: 108 MMOL/L (ref 96–112)
CO2 SERPL-SCNC: 24 MMOL/L (ref 20–33)
COLOR UR: YELLOW
CREAT SERPL-MCNC: 0.75 MG/DL (ref 0.5–1.4)
EKG IMPRESSION: NORMAL
EOSINOPHIL # BLD AUTO: 0.04 K/UL (ref 0–0.51)
EOSINOPHIL NFR BLD: 0.8 % (ref 0–6.9)
ERYTHROCYTE [DISTWIDTH] IN BLOOD BY AUTOMATED COUNT: 46.1 FL (ref 35.9–50)
GLOBULIN SER CALC-MCNC: 2.7 G/DL (ref 1.9–3.5)
GLUCOSE SERPL-MCNC: 120 MG/DL (ref 65–99)
GLUCOSE UR STRIP.AUTO-MCNC: NEGATIVE MG/DL
HCT VFR BLD AUTO: 40 % (ref 37–47)
HGB BLD-MCNC: 13.2 G/DL (ref 12–16)
IMM GRANULOCYTES # BLD AUTO: 0.01 K/UL (ref 0–0.11)
IMM GRANULOCYTES NFR BLD AUTO: 0.2 % (ref 0–0.9)
KETONES UR STRIP.AUTO-MCNC: 15 MG/DL
LEUKOCYTE ESTERASE UR QL STRIP.AUTO: NEGATIVE
LYMPHOCYTES # BLD AUTO: 1.21 K/UL (ref 1–4.8)
LYMPHOCYTES NFR BLD: 24.6 % (ref 22–41)
MCH RBC QN AUTO: 32.4 PG (ref 27–33)
MCHC RBC AUTO-ENTMCNC: 33 G/DL (ref 33.6–35)
MCV RBC AUTO: 98 FL (ref 81.4–97.8)
MICRO URNS: ABNORMAL
MONOCYTES # BLD AUTO: 0.25 K/UL (ref 0–0.85)
MONOCYTES NFR BLD AUTO: 5.1 % (ref 0–13.4)
NEUTROPHILS # BLD AUTO: 3.37 K/UL (ref 2–7.15)
NEUTROPHILS NFR BLD: 68.7 % (ref 44–72)
NITRITE UR QL STRIP.AUTO: NEGATIVE
NRBC # BLD AUTO: 0 K/UL
NRBC BLD-RTO: 0 /100 WBC
PH UR STRIP.AUTO: 6.5 [PH]
PLATELET # BLD AUTO: 242 K/UL (ref 164–446)
PMV BLD AUTO: 10.6 FL (ref 9–12.9)
POTASSIUM SERPL-SCNC: 3.6 MMOL/L (ref 3.6–5.5)
PROT SERPL-MCNC: 6.7 G/DL (ref 6–8.2)
PROT UR QL STRIP: NEGATIVE MG/DL
RBC # BLD AUTO: 4.08 M/UL (ref 4.2–5.4)
RBC UR QL AUTO: NEGATIVE
SODIUM SERPL-SCNC: 144 MMOL/L (ref 135–145)
SP GR UR STRIP.AUTO: 1.02
TROPONIN T SERPL-MCNC: <6 NG/L (ref 6–19)
UROBILINOGEN UR STRIP.AUTO-MCNC: 0.2 MG/DL
WBC # BLD AUTO: 4.9 K/UL (ref 4.8–10.8)

## 2019-07-10 PROCEDURE — 93005 ELECTROCARDIOGRAM TRACING: CPT | Performed by: EMERGENCY MEDICINE

## 2019-07-10 PROCEDURE — 85025 COMPLETE CBC W/AUTO DIFF WBC: CPT

## 2019-07-10 PROCEDURE — 93005 ELECTROCARDIOGRAM TRACING: CPT

## 2019-07-10 PROCEDURE — 36415 COLL VENOUS BLD VENIPUNCTURE: CPT

## 2019-07-10 PROCEDURE — 70450 CT HEAD/BRAIN W/O DYE: CPT

## 2019-07-10 PROCEDURE — 81003 URINALYSIS AUTO W/O SCOPE: CPT

## 2019-07-10 PROCEDURE — 700102 HCHG RX REV CODE 250 W/ 637 OVERRIDE(OP): Performed by: EMERGENCY MEDICINE

## 2019-07-10 PROCEDURE — 99285 EMERGENCY DEPT VISIT HI MDM: CPT

## 2019-07-10 PROCEDURE — A9270 NON-COVERED ITEM OR SERVICE: HCPCS | Performed by: EMERGENCY MEDICINE

## 2019-07-10 PROCEDURE — 84484 ASSAY OF TROPONIN QUANT: CPT

## 2019-07-10 PROCEDURE — 80053 COMPREHEN METABOLIC PANEL: CPT

## 2019-07-10 PROCEDURE — 71045 X-RAY EXAM CHEST 1 VIEW: CPT

## 2019-07-10 RX ORDER — LEVETIRACETAM 500 MG/1
500 TABLET ORAL DAILY
Qty: 30 TAB | Refills: 0 | Status: SHIPPED | OUTPATIENT
Start: 2019-07-10 | End: 2019-08-09

## 2019-07-10 RX ORDER — LEVETIRACETAM 500 MG/1
500 TABLET ORAL ONCE
Status: COMPLETED | OUTPATIENT
Start: 2019-07-10 | End: 2019-07-10

## 2019-07-10 RX ADMIN — LEVETIRACETAM 500 MG: 500 TABLET ORAL at 12:17

## 2019-07-10 ASSESSMENT — LIFESTYLE VARIABLES: DO YOU DRINK ALCOHOL: NO

## 2019-07-10 NOTE — ED TRIAGE NOTES
".Azul Milan  .  Chief Complaint   Patient presents with   • Syncope     c/o syncopal episode x 2 this morning. patient reports hx of same.      Patient to triage with above complaint. .Blood Pressure : 119/64, Pulse: (!) 56, Respiration: 18, Temperature: 36.1 °C (97 °F), Height: 167.6 cm (5' 6\"), Weight: 53.3 kg (117 lb 8.1 oz), Pulse Oximetry: 94 %      EKG completed.  Patient to lobby and instructed to inform staff of any needs.  "

## 2019-07-10 NOTE — ED PROVIDER NOTES
ED Provider Note    CHIEF COMPLAINT  Chief Complaint   Patient presents with   • Syncope     c/o syncopal episode x 2 this morning. patient reports hx of same.        HPI  Azul Milan is a 68 y.o. female who presents to the emergency department after reportedly passing out.  Patient has a seizure disorder.  She is on Keppra.  She takes 1 g nightly and has been compliant with the medications.  This morning her  gave her a cup of coffee.  She said the coffee on the table and then slumped back and had gurgling or abnormal respiratory duration with shaking.  This lasted for about 20 seconds and then resolved.  She was confused after, but returned to baseline.  Because of this episode he was driving her to the hospital.  While in route she had a second episode of similar character.  She does not recall these events.  She does have a history of dementia and is not a good historian.  Her  provides the majority of the history.  Currently while in the ER the patient denies any complaints.  She denies headache, weakness numbness, fevers, chest pain, shortness of breath, cough, dysuria or hematuria.    REVIEW OF SYSTEMS  As per HPI, otherwise a 10 point review of systems is negative    PAST MEDICAL HISTORY  Dementia, seizure, hyponatremia, A. fib    SOCIAL HISTORY  Social History   Substance Use Topics   • Smoking status: Never Smoker   • Smokeless tobacco: Never Used   • Alcohol use No       SURGICAL HISTORY  Past Surgical History:   Procedure Laterality Date   • COLONOSCOPY     • ENDOSCOPY     • TONSILLECTOMY     • TUBAL COAGULATION LAPAROSCOPIC BILATERAL         CURRENT MEDICATIONS  Home Medications     Reviewed by Tristen Benavidez (Pharmacy Tech) on 07/10/19 at 1032  Med List Status: Complete   Medication Last Dose Status   B Complex Vitamins (VITAMIN B COMPLEX) Tab 7/9/2019 Active   calcium carbonate (OS-JEANNE 500) 500 MG Tab 7/9/2019 Active   donepezil (ARICEPT) 10 MG tablet 7/9/2019  "Active   levetiracetam (KEPPRA XR) 500 MG TABLET SR 24 HR 7/9/2019 Active   Multiple Vitamins-Minerals (OCUVITE EYE HEALTH FORMULA) Cap 7/9/2019 Active   Omega-3 Fatty Acids (FISH OIL) 1000 MG Cap capsule 7/9/2019 Active   rivaroxaban (XARELTO) 20 MG Tab tablet 7/9/2019 Active   Vitamin D, Cholecalciferol, 1000 UNITS Cap 7/9/2019 Active   Zinc 50 MG Tab 7/9/2019 Active                ALLERGIES  Allergies   Allergen Reactions   • Other Food      Corn   • Penicillins Rash     Minor RASH  Received as a young child       PHYSICAL EXAM  VITAL SIGNS: /64   Pulse 71   Temp 36.1 °C (97 °F) (Temporal)   Resp 19   Ht 1.676 m (5' 6\")   Wt 53.3 kg (117 lb 8.1 oz)   SpO2 99%   BMI 18.97 kg/m²    Constitutional: Awake and alert  HENT:  Atraumatic, Normocephalic.Oropharynx dry mucus membranes, Nose normal inspection.   Eyes: Normal inspection  Neck: Supple  Cardiovascular: Normal heart rate, Normal rhythm.  Symmetric peripheral pulses.   Thorax & Lungs: No respiratory distress, No wheezing, No rales, No rhonchi, No chest tenderness.   Abdomen: Bowel sounds normal, soft, non-distended, nontender, no mass  Skin: Warm, Dry, No rash.   Back: No tenderness, No CVA tenderness.   Extremities: No clubbing, cyanosis, edema, no Homans or cords   Neurologic: Awake and alert.  Clear speech.  Follows commands.  Cranial nerves II through XII intact.  Symmetric strong motor and sensory.  Steady gait.  NIH 0  Psychiatric: Anxious appearing    RADIOLOGY/PROCEDURES  CT-HEAD W/O   Final Result      1.  Cerebral atrophy.      2.  White matter lucencies most consistent with small vessel ischemic change versus demyelination or gliosis.      3.  Otherwise, Head CT without contrast with no acute findings. No evidence of acute cerebral  hemorrhage or mass lesion.      DX-CHEST-PORTABLE (1 VIEW)   Final Result      Hyperaeration without acute cardiopulmonary disease evident.           Imaging is interpreted by radiologist    Labs:  Results for " orders placed or performed during the hospital encounter of 07/10/19   CBC WITH DIFFERENTIAL   Result Value Ref Range    WBC 4.9 4.8 - 10.8 K/uL    RBC 4.08 (L) 4.20 - 5.40 M/uL    Hemoglobin 13.2 12.0 - 16.0 g/dL    Hematocrit 40.0 37.0 - 47.0 %    MCV 98.0 (H) 81.4 - 97.8 fL    MCH 32.4 27.0 - 33.0 pg    MCHC 33.0 (L) 33.6 - 35.0 g/dL    RDW 46.1 35.9 - 50.0 fL    Platelet Count 242 164 - 446 K/uL    MPV 10.6 9.0 - 12.9 fL    Neutrophils-Polys 68.70 44.00 - 72.00 %    Lymphocytes 24.60 22.00 - 41.00 %    Monocytes 5.10 0.00 - 13.40 %    Eosinophils 0.80 0.00 - 6.90 %    Basophils 0.60 0.00 - 1.80 %    Immature Granulocytes 0.20 0.00 - 0.90 %    Nucleated RBC 0.00 /100 WBC    Neutrophils (Absolute) 3.37 2.00 - 7.15 K/uL    Lymphs (Absolute) 1.21 1.00 - 4.80 K/uL    Monos (Absolute) 0.25 0.00 - 0.85 K/uL    Eos (Absolute) 0.04 0.00 - 0.51 K/uL    Baso (Absolute) 0.03 0.00 - 0.12 K/uL    Immature Granulocytes (abs) 0.01 0.00 - 0.11 K/uL    NRBC (Absolute) 0.00 K/uL   COMP METABOLIC PANEL   Result Value Ref Range    Sodium 144 135 - 145 mmol/L    Potassium 3.6 3.6 - 5.5 mmol/L    Chloride 108 96 - 112 mmol/L    Co2 24 20 - 33 mmol/L    Anion Gap 12.0 (H) 0.0 - 11.9    Glucose 120 (H) 65 - 99 mg/dL    Bun 17 8 - 22 mg/dL    Creatinine 0.75 0.50 - 1.40 mg/dL    Calcium 9.3 8.5 - 10.5 mg/dL    AST(SGOT) 27 12 - 45 U/L    ALT(SGPT) 29 2 - 50 U/L    Alkaline Phosphatase 101 (H) 30 - 99 U/L    Total Bilirubin 0.5 0.1 - 1.5 mg/dL    Albumin 4.0 3.2 - 4.9 g/dL    Total Protein 6.7 6.0 - 8.2 g/dL    Globulin 2.7 1.9 - 3.5 g/dL    A-G Ratio 1.5 g/dL   TROPONIN   Result Value Ref Range    Troponin T <6 6 - 19 ng/L   ESTIMATED GFR   Result Value Ref Range    GFR If African American >60 >60 mL/min/1.73 m 2    GFR If Non African American >60 >60 mL/min/1.73 m 2   URINALYSIS,CULTURE IF INDICATED   Result Value Ref Range    Color Yellow     Character Clear     Specific Gravity 1.023 <1.035    Ph 6.5 5.0 - 8.0    Glucose Negative  Negative mg/dL    Ketones 15 (A) Negative mg/dL    Protein Negative Negative mg/dL    Bilirubin Negative Negative    Urobilinogen, Urine 0.2 Negative    Nitrite Negative Negative    Leukocyte Esterase Negative Negative    Occult Blood Negative Negative    Micro Urine Req see below    EKG   Result Value Ref Range    Report       [facility] Emergency Dept.    Test Date:  2019-07-10  Pt Name:    KASH MAIN            Department: ER  MRN:        [id number]                      Room:  Gender:     Female                       Technician: [id number]  :        [id number]                   Requested By:ER TRIAGE PROTOCOL  Order #:    [id number]                    Reading MD: BEKAH PARRY MD    Measurements  Intervals                                Axis  Rate:       52                           P:          60  MO:         232                          QRS:        34  QRSD:       104                          T:          15  QT:         472  QTc:        439    Interpretive Statements  SINUS BRADYCARDIA  ATRIAL PREMATURE COMPLEX  FIRST DEGREE AV BLOCK  RSR' IN V1 OR V2, RIGHT VCD OR RVH  BASELINE WANDER IN LEAD(S) II,III,aVF  Compared to ECG 2019 18:12:38  Atrial premature complex(es) now present  First degree AV block now present  Sinus rhythm no longer present    Electronically Signed On 7-  8:09:26 PDT by BEKAH PARRY MD           COURSE & MEDICAL DECISION MAKING  Patient presents with 2 separate unprovoked short duration breakthrough seizures this morning.  She is neurologically intact aside from confusion which is at baseline according to the family.  Screening evaluation was undertaken and was reassuring.  Patient is currently taking sustained-release Keppra 1000 mg daily.  I paged Dr. Miguel.    Discussed case with Dr. Miguel.  Patient does appear appropriate for outpatient management.  Dr. Miguel advised add Keppra 500 mg every morning and continue patient on current Keppra daily  dose.  I gave the patient first dose here in the ER.  I discussed this plan with the family.  Advised to return to the ER for any recurrent events, abnormal mental status, neurologic symptoms or concern.      FINAL IMPRESSION  1.  Breakthrough seizure      This dictation was created using voice recognition software. The accuracy of the dictation is limited to the abilities of the software.  The nursing notes were reviewed and certain aspects of this information were incorporated into this note.      Electronically signed by: Onel Marshall, 7/10/2019 11:48 AM

## 2019-07-10 NOTE — ED NOTES
Azul Milan discharged via ambulatory with steady gait with .  Discharge instructions given and reviewed, patient educated to follow up with neuro, verbalized understanding.  Prescriptions given x 1.  All personal belongings in possession.  No questions at this time.

## 2019-07-12 ENCOUNTER — TELEPHONE (OUTPATIENT)
Dept: NEUROLOGY | Facility: MEDICAL CENTER | Age: 69
End: 2019-07-12

## 2019-07-12 NOTE — TELEPHONE ENCOUNTER
Please find out from the family what dose of Keppra she has been on.  She should have been on Keppra ER 1000 mg daily already, I do not understand what an increased at that same dose means.  Did they lower the dose down?  We had already stopped the Depakote a while ago because of soporific side effects.    Obviously, if they had cut the Keppra dosing down, this is probably why she is having blackout spells that may be due to seizures, they need to get her back up and maintain the 1000 mg dosing regimen.

## 2019-07-12 NOTE — TELEPHONE ENCOUNTER
----- Message from Alfredito Carrillo, Med Ass't sent at 7/12/2019  8:26 AM PDT -----  Regarding: FW: Non-Urgent Medical Question  Contact: [phone]      ----- Message -----  From: Azul Milan  Sent: 7/10/2019   1:56 PM  To: Neurology [facility]  Subject: Non-Urgent Medical Question                      Jose eMndoza. This is Faby. My mom Regina Milan was at the ER again today after passing out 3 times, completely. They tested sodium, did CT, etc and it was all clear, so they think seizures and they raised her Kepra to 1000 mg and said to get her in to see you. She has an apt in October, but should she be seen earlier or wait to see what the increased Kepra does? It is hard to tell what is a seizure and what is critically low sodium with her as they look the same. Thanks, Faby

## 2019-07-16 ENCOUNTER — HOSPITAL ENCOUNTER (OUTPATIENT)
Dept: LAB | Facility: MEDICAL CENTER | Age: 69
End: 2019-07-16
Attending: INTERNAL MEDICINE
Payer: MEDICARE

## 2019-07-16 PROCEDURE — 36415 COLL VENOUS BLD VENIPUNCTURE: CPT

## 2019-07-16 PROCEDURE — 81256 HFE GENE: CPT

## 2019-07-20 LAB
HFE GENE MUT ANL BLD/T: NORMAL
HFE P.C282Y BLD/T QL: NEGATIVE
HFE P.H63D BLD/T QL: NORMAL
HFE P.S65C BLD/T QL: NEGATIVE

## 2019-07-23 DIAGNOSIS — F02.80 EARLY ONSET ALZHEIMER'S DEMENTIA WITHOUT BEHAVIORAL DISTURBANCE (HCC): ICD-10-CM

## 2019-07-23 DIAGNOSIS — G30.0 EARLY ONSET ALZHEIMER'S DEMENTIA WITHOUT BEHAVIORAL DISTURBANCE (HCC): ICD-10-CM

## 2019-07-26 RX ORDER — ESCITALOPRAM OXALATE 10 MG/1
TABLET ORAL
Qty: 90 TAB | Refills: 3 | Status: SHIPPED | OUTPATIENT
Start: 2019-07-26 | End: 2019-08-30

## 2019-08-08 RX ORDER — LEVETIRACETAM 500 MG/1
1000 TABLET, FILM COATED, EXTENDED RELEASE ORAL DAILY
Qty: 60 TAB | Refills: 11 | Status: SHIPPED | OUTPATIENT
Start: 2019-08-08 | End: 2019-08-30

## 2019-08-16 ENCOUNTER — NON-PROVIDER VISIT (OUTPATIENT)
Dept: NEUROLOGY | Facility: MEDICAL CENTER | Age: 69
End: 2019-08-16
Payer: MEDICARE

## 2019-08-16 DIAGNOSIS — G40.209 COMPLEX PARTIAL SEIZURES WITH IMPAIRED CONSCIOUSNESS AT ONSET (HCC): ICD-10-CM

## 2019-08-16 PROCEDURE — 95951 EEG: CPT | Mod: 52 | Performed by: PSYCHIATRY & NEUROLOGY

## 2019-08-16 NOTE — PROCEDURES
VIDEO ELECTROENCEPHALOGRAM REPORT      Referring provider: Dr. Rachelle Isaac    DOS: August 16, 2019 of 30-minute duration.    INDICATION:  Azul Milan 69 y.o. female presenting with a history of progressive memory loss consistent with Alzheimer's disease and seizures.  EEG is being requested to rule out nonconvulsive seizure activity.    CURRENT ANTIEPILEPTIC REGIMEN: Keppra 1000 mg nightly and Aricept 10 mg nightly.    TECHNIQUE: 30 channel video electroencephalogram (EEG) was performed in accordance with the international 10-20 system. The study was reviewed in bipolar and referential montages. The recording examined the patient during wakeful and drowsy/sleep state(s).     DESCRIPTION OF THE RECORD:  During the wakefulness, the background showed a symmetrical 9 Hz alpha activity posteriorly with amplitude of 70 mV.  There was reactivity to eye closure/opening.  A normal anterior-posterior gradient was noted with faster beta frequencies seen anteriorly.  During drowsiness, theta/delta frequencies were seen.    During the sleep state, background shows diffuse high-amplitude 4-5 Hz delta activity.  Symmetrical high-amplitude sleep spindles and vertex sharps were seen in the leads over the central regions.     ACTIVATION PROCEDURES:   Hyperventilation was performed by the patient for a total of 3 minutes. The technician performing the test noted good effort. This technique produced electroencephalographic changes in keeping with the expected bilaterally synchronous, frontally predominant, high amplitude slow waves build up.     Intermittent Photic stimulation was performed in a stepwise fashion from 1 to 30 Hz and elicited a normal response (photic driving), most noticeable in the posterior leads.      ICTAL AND/OR INTERICTAL FINDINGS:   No focal or generalized epileptiform activity noted.  Throughout the tracing, intermittent slowing can be seen bitemporally, amplitudes greater of the left anterior  temporal region.  No clear epileptiform or paroxysmal qualities are seen, the rhythm is 6-7 Hz.  There is no associated change in the patient's demeanor, no seizure activity is seen.  Only brief stages of sleep are seen, again with a bitemporal slowing.    EKG: sampling of the EKG recording demonstrated sinus rhythm.     EVENTS: None    INTERPRETATION:  This is mildly abnormal video EEG recording in the awake and mostly drowsy state(s).  Clinical correlation is recommended.  Occasional slowing is seen bitemporally, nothing suggesting underlying epileptiform activity or potential.    Note: A normal EEG does not rule out epilepsy.  If the clinical suspicion remains high for seizures, a prolonged recording to capture clinical or subclinical events may be helpful.        Carlos Worthy M.D.

## 2019-08-17 ENCOUNTER — TELEPHONE (OUTPATIENT)
Dept: NEUROLOGY | Facility: MEDICAL CENTER | Age: 69
End: 2019-08-17

## 2019-08-17 NOTE — TELEPHONE ENCOUNTER
----- Message from Malina Conner sent at 8/16/2019  2:17 PM PDT -----  Regarding: Transfer of provider    Jose Worthy and Dr. Pringle,    This patient is a patient of Dr. Worthy currently. The daughter states that due to her Dimentia that there had been the suggestion of her transferring to Dr. NEGRON during her last visit.   Please let me know if this is something that would be okay with the both of you?      Thanks,

## 2019-08-17 NOTE — TELEPHONE ENCOUNTER
Usually in the circumstances, I discuss the possibility of a second opinion with Dr. Pringle, I do not specifically mention transferring care completely.  This is especially true given her present circumstance of the difficulty of transferring patients between doctors who are already busy.  I will need to talk with Dr. NEGRON about this first.  We will reach out to the patient's daughter to clarify what their specific request is, second opinion versus transfer of care, and in the meantime I will discuss the case with Dr. NEGRON.

## 2019-08-24 RX ORDER — RIVAROXABAN 20 MG/1
TABLET, FILM COATED ORAL
Qty: 90 TAB | Refills: 0 | Status: SHIPPED | OUTPATIENT
Start: 2019-08-24 | End: 2019-08-30

## 2019-08-30 ENCOUNTER — APPOINTMENT (OUTPATIENT)
Dept: RADIOLOGY | Facility: MEDICAL CENTER | Age: 69
End: 2019-08-30
Attending: EMERGENCY MEDICINE
Payer: MEDICARE

## 2019-08-30 ENCOUNTER — HOSPITAL ENCOUNTER (EMERGENCY)
Facility: MEDICAL CENTER | Age: 69
End: 2019-08-30
Attending: EMERGENCY MEDICINE
Payer: MEDICARE

## 2019-08-30 VITALS
RESPIRATION RATE: 18 BRPM | DIASTOLIC BLOOD PRESSURE: 65 MMHG | BODY MASS INDEX: 18.99 KG/M2 | TEMPERATURE: 97.9 F | WEIGHT: 118.17 LBS | SYSTOLIC BLOOD PRESSURE: 133 MMHG | HEART RATE: 59 BPM | HEIGHT: 66 IN | OXYGEN SATURATION: 100 %

## 2019-08-30 DIAGNOSIS — K59.00 CONSTIPATION, UNSPECIFIED CONSTIPATION TYPE: ICD-10-CM

## 2019-08-30 DIAGNOSIS — R14.0 BLOATING: ICD-10-CM

## 2019-08-30 DIAGNOSIS — R10.84 GENERALIZED ABDOMINAL PAIN: ICD-10-CM

## 2019-08-30 LAB
ALBUMIN SERPL BCP-MCNC: 3.8 G/DL (ref 3.2–4.9)
ALBUMIN/GLOB SERPL: 1.6 G/DL
ALP SERPL-CCNC: 70 U/L (ref 30–99)
ALT SERPL-CCNC: 21 U/L (ref 2–50)
ANION GAP SERPL CALC-SCNC: 9 MMOL/L (ref 0–11.9)
AST SERPL-CCNC: 24 U/L (ref 12–45)
BASOPHILS # BLD AUTO: 0.7 % (ref 0–1.8)
BASOPHILS # BLD: 0.04 K/UL (ref 0–0.12)
BILIRUB SERPL-MCNC: 0.3 MG/DL (ref 0.1–1.5)
BUN SERPL-MCNC: 11 MG/DL (ref 8–22)
CALCIUM SERPL-MCNC: 8.7 MG/DL (ref 8.5–10.5)
CHLORIDE SERPL-SCNC: 107 MMOL/L (ref 96–112)
CO2 SERPL-SCNC: 25 MMOL/L (ref 20–33)
CREAT SERPL-MCNC: 0.65 MG/DL (ref 0.5–1.4)
EOSINOPHIL # BLD AUTO: 0.11 K/UL (ref 0–0.51)
EOSINOPHIL NFR BLD: 1.8 % (ref 0–6.9)
ERYTHROCYTE [DISTWIDTH] IN BLOOD BY AUTOMATED COUNT: 41.9 FL (ref 35.9–50)
GLOBULIN SER CALC-MCNC: 2.4 G/DL (ref 1.9–3.5)
GLUCOSE SERPL-MCNC: 99 MG/DL (ref 65–99)
HCT VFR BLD AUTO: 38.4 % (ref 37–47)
HGB BLD-MCNC: 13 G/DL (ref 12–16)
IMM GRANULOCYTES # BLD AUTO: 0.01 K/UL (ref 0–0.11)
IMM GRANULOCYTES NFR BLD AUTO: 0.2 % (ref 0–0.9)
LACTATE BLD-SCNC: 0.9 MMOL/L (ref 0.5–2)
LIPASE SERPL-CCNC: 24 U/L (ref 11–82)
LYMPHOCYTES # BLD AUTO: 1.57 K/UL (ref 1–4.8)
LYMPHOCYTES NFR BLD: 26.3 % (ref 22–41)
MCH RBC QN AUTO: 32.3 PG (ref 27–33)
MCHC RBC AUTO-ENTMCNC: 33.9 G/DL (ref 33.6–35)
MCV RBC AUTO: 95.5 FL (ref 81.4–97.8)
MONOCYTES # BLD AUTO: 0.44 K/UL (ref 0–0.85)
MONOCYTES NFR BLD AUTO: 7.4 % (ref 0–13.4)
NEUTROPHILS # BLD AUTO: 3.8 K/UL (ref 2–7.15)
NEUTROPHILS NFR BLD: 63.6 % (ref 44–72)
NRBC # BLD AUTO: 0 K/UL
NRBC BLD-RTO: 0 /100 WBC
PLATELET # BLD AUTO: 215 K/UL (ref 164–446)
PMV BLD AUTO: 10.5 FL (ref 9–12.9)
POTASSIUM SERPL-SCNC: 4.5 MMOL/L (ref 3.6–5.5)
PROT SERPL-MCNC: 6.2 G/DL (ref 6–8.2)
RBC # BLD AUTO: 4.02 M/UL (ref 4.2–5.4)
SODIUM SERPL-SCNC: 141 MMOL/L (ref 135–145)
WBC # BLD AUTO: 6 K/UL (ref 4.8–10.8)

## 2019-08-30 PROCEDURE — 99284 EMERGENCY DEPT VISIT MOD MDM: CPT

## 2019-08-30 PROCEDURE — 83690 ASSAY OF LIPASE: CPT

## 2019-08-30 PROCEDURE — 80053 COMPREHEN METABOLIC PANEL: CPT

## 2019-08-30 PROCEDURE — 74177 CT ABD & PELVIS W/CONTRAST: CPT

## 2019-08-30 PROCEDURE — 83605 ASSAY OF LACTIC ACID: CPT

## 2019-08-30 PROCEDURE — 85025 COMPLETE CBC W/AUTO DIFF WBC: CPT

## 2019-08-30 PROCEDURE — 700117 HCHG RX CONTRAST REV CODE 255: Performed by: EMERGENCY MEDICINE

## 2019-08-30 RX ORDER — ESCITALOPRAM OXALATE 10 MG/1
10 TABLET ORAL EVERY MORNING
COMMUNITY
End: 2019-10-01 | Stop reason: SDUPTHER

## 2019-08-30 RX ORDER — LEVETIRACETAM 500 MG/1
1000 TABLET, FILM COATED, EXTENDED RELEASE ORAL
COMMUNITY
End: 2019-10-01 | Stop reason: SDUPTHER

## 2019-08-30 RX ORDER — DONEPEZIL HYDROCHLORIDE 10 MG/1
10 TABLET, FILM COATED ORAL NIGHTLY
COMMUNITY
End: 2019-10-01 | Stop reason: SINTOL

## 2019-08-30 RX ADMIN — IOHEXOL 80 ML: 350 INJECTION, SOLUTION INTRAVENOUS at 13:23

## 2019-08-30 ASSESSMENT — LIFESTYLE VARIABLES
EVER HAD A DRINK FIRST THING IN THE MORNING TO STEADY YOUR NERVES TO GET RID OF A HANGOVER: NO
EVER FELT BAD OR GUILTY ABOUT YOUR DRINKING: NO
TOTAL SCORE: 0
CONSUMPTION TOTAL: INCOMPLETE
HAVE YOU EVER FELT YOU SHOULD CUT DOWN ON YOUR DRINKING: NO
DO YOU DRINK ALCOHOL: NO
TOTAL SCORE: 0
HAVE PEOPLE ANNOYED YOU BY CRITICIZING YOUR DRINKING: NO
TOTAL SCORE: 0

## 2019-08-30 NOTE — ED TRIAGE NOTES
Pt to triage .  Chief Complaint   Patient presents with   • Abdominal Pain   • N/V   • Constipation     x 1 month    • Loss of Appetite   • Difficulty Swallowing

## 2019-08-30 NOTE — ED PROVIDER NOTES
"ED Provider Note    CHIEF COMPLAINT  Chief Complaint   Patient presents with   • Abdominal Pain   • N/V   • Constipation     x 1 month    • Loss of Appetite   • Difficulty Swallowing       HPI  Azul Milan is a 69 y.o. female who presents to the emergency department through triage with her  for abdominal pain.  Patient describes 2 to 3 months of intermittent but progressive and more frequent abdominal pain and discomfort.  Bloating noted.  Constipation, difficulty with bowel movements although did pass small stool this morning \"but it is just different than it was before.\"  Denies any history of prior to constipation.  Denies any diarrhea.  Denies bloody stools.  Denies nausea or vomiting.  Patient does have some decreased appetite but denies early satiety.  Some difficulty swallowing or full feeling in her throat as well.  No fever or chills.    Patient is seen primary care and GI for the symptoms, states she had \"some tests\" but cannot elaborate.  Denies EGD or colonoscopy.  States she supposed to see GI, Dr. olvera, again in a couple of weeks.    Patient with history of dementia, presents with her  who concurs this HPI.  Compliant with medications.  History of A. fib, seizure although none since antiepileptics initiated last year.    REVIEW OF SYSTEMS  See HPI for further details. All other systems are negative.     PAST MEDICAL HISTORY   has a past medical history of A-fib (HCC), Hypokalemia, Hypotension, and Syncope.    SOCIAL HISTORY  Social History     Tobacco Use   • Smoking status: Never Smoker   • Smokeless tobacco: Never Used   Substance and Sexual Activity   • Alcohol use: No   • Drug use: No   • Sexual activity: Yes     Partners: Male       SURGICAL HISTORY   has a past surgical history that includes tonsillectomy; tubal coagulation laparoscopic bilateral; colonoscopy; and endoscopy.    CURRENT MEDICATIONS  Home Medications     Reviewed by Tristen Fontenot (Pharmacy Tech) " "on 08/30/19 at 1115  Med List Status: Complete   Medication Last Dose Status   B Complex Vitamins (VITAMIN B COMPLEX) Tab 8/29/2019 Active   calcium carbonate (OS-JEANNE 500) 500 MG Tab 8/29/2019 Active   donepezil (ARICEPT) 10 MG tablet 8/29/2019 Active   escitalopram (LEXAPRO) 10 MG Tab 8/29/2019 Active   levetiracetam (KEPPRA XR) 500 MG TABLET SR 24 HR 8/29/2019 Active   Multiple Vitamins-Minerals (OCUVITE EYE HEALTH FORMULA) Cap 8/29/2019 Active   Omega-3 Fatty Acids (FISH OIL) 1000 MG Cap capsule 8/29/2019 Active   rivaroxaban (XARELTO) 20 MG Tab tablet 8/29/2019 Active   Vitamin D, Cholecalciferol, 1000 UNITS Cap 8/29/2019 Active   Zinc 50 MG Tab 8/29/2019 Active                ALLERGIES  Allergies   Allergen Reactions   • Other Food Unspecified     Corn   • Penicillins Rash     Minor RASH  Received as a young child       PHYSICAL EXAM  VITAL SIGNS: /80   Pulse 64   Temp 36.6 °C (97.9 °F) (Temporal)   Resp 18   Ht 1.676 m (5' 6\")   Wt 53.6 kg (118 lb 2.7 oz)   SpO2 96%   BMI 19.07 kg/m²   Pulse ox interpretation: I interpret this pulse ox as normal.  Constitutional: Alert in no apparent distress.  HENT: Normocephalic, atraumatic. Bilateral external ears normal, Nose normal. Moist mucous membranes.    Eyes: Pupils are equal and reactive, Conjunctiva normal.   Neck: Normal range of motion, Supple  Lymphatic: No lymphadenopathy noted.   Cardiovascular: Regular rate and rhythm, no murmurs. Distal pulses intact.  Thorax & Lungs: Normal breath sounds.  No wheezing/rales/ronchi. No increased work of breathing  Abdomen: Soft, non-distended, non-tender to palpation.  No reproducible discomfort with palpation.  No palpable or pulsatile masses. No peritoneal signs. No CVA tenderness.  Skin: Warm, Dry  Musculoskeletal: Good range of motion in all major joints.   Neurologic: Alert and oriented x4.  Speech clear and cohesive.  Moves 4 extremities spontaneously, ambulates independently.  Psychiatric: Flat affect.  " [facility].      DIAGNOSTIC STUDIES / PROCEDURES    LABS  Results for orders placed or performed during the hospital encounter of 08/30/19   CBC WITH DIFFERENTIAL   Result Value Ref Range    WBC 6.0 4.8 - 10.8 K/uL    RBC 4.02 (L) 4.20 - 5.40 M/uL    Hemoglobin 13.0 12.0 - 16.0 g/dL    Hematocrit 38.4 37.0 - 47.0 %    MCV 95.5 81.4 - 97.8 fL    MCH 32.3 27.0 - 33.0 pg    MCHC 33.9 33.6 - 35.0 g/dL    RDW 41.9 35.9 - 50.0 fL    Platelet Count 215 164 - 446 K/uL    MPV 10.5 9.0 - 12.9 fL    Neutrophils-Polys 63.60 44.00 - 72.00 %    Lymphocytes 26.30 22.00 - 41.00 %    Monocytes 7.40 0.00 - 13.40 %    Eosinophils 1.80 0.00 - 6.90 %    Basophils 0.70 0.00 - 1.80 %    Immature Granulocytes 0.20 0.00 - 0.90 %    Nucleated RBC 0.00 /100 WBC    Neutrophils (Absolute) 3.80 2.00 - 7.15 K/uL    Lymphs (Absolute) 1.57 1.00 - 4.80 K/uL    Monos (Absolute) 0.44 0.00 - 0.85 K/uL    Eos (Absolute) 0.11 0.00 - 0.51 K/uL    Baso (Absolute) 0.04 0.00 - 0.12 K/uL    Immature Granulocytes (abs) 0.01 0.00 - 0.11 K/uL    NRBC (Absolute) 0.00 K/uL   COMP METABOLIC PANEL   Result Value Ref Range    Sodium 141 135 - 145 mmol/L    Potassium 4.5 3.6 - 5.5 mmol/L    Chloride 107 96 - 112 mmol/L    Co2 25 20 - 33 mmol/L    Anion Gap 9.0 0.0 - 11.9    Glucose 99 65 - 99 mg/dL    Bun 11 8 - 22 mg/dL    Creatinine 0.65 0.50 - 1.40 mg/dL    Calcium 8.7 8.5 - 10.5 mg/dL    AST(SGOT) 24 12 - 45 U/L    ALT(SGPT) 21 2 - 50 U/L    Alkaline Phosphatase 70 30 - 99 U/L    Total Bilirubin 0.3 0.1 - 1.5 mg/dL    Albumin 3.8 3.2 - 4.9 g/dL    Total Protein 6.2 6.0 - 8.2 g/dL    Globulin 2.4 1.9 - 3.5 g/dL    A-G Ratio 1.6 g/dL   LIPASE   Result Value Ref Range    Lipase 24 11 - 82 U/L   LACTIC ACID   Result Value Ref Range    Lactic Acid 0.9 0.5 - 2.0 mmol/L   ESTIMATED GFR   Result Value Ref Range    GFR If African American >60 >60 mL/min/1.73 m 2    GFR If Non African American >60 >60 mL/min/1.73 m 2     RADIOLOGY  CT-ABDOMEN-PELVIS WITH    (Results  Pending)       COURSE & MEDICAL DECISION MAKING  Nursing notes and vital signs were reviewed. (See chart for details)  The patients records were reviewed, history was obtained from the patient;     12:14 PM ED evaluation for abdominal pain is thus far unremarkable.  Labs within normal limits.  No leukocytosis or anemia.  No left leg derangement.  No lactic acidosis.  History of atrial fibrillation but compliant with anticoagulation.  Vital signs are stable without fever tachycardia, never hypotensive.  Abdominal exam is benign.  Symptomatology is somewhat indolent and chronic.  CT still pending.  Should be signed out to my colleague, Dr. العلي, for reevaluation and final disposition upon results.  If normal, anticipate discharge home with follow-up with GI as scheduled in a couple of weeks.    FINAL IMPRESSION  (R10.84) Generalized abdominal pain  (R14.0) Bloating  (K59.00) Constipation, unspecified constipation type      Electronically signed by: Dolores Still, 8/30/2019 10:13 AM      This dictation was created using voice recognition software. The accuracy of the dictation is limited to the abilities of the software. I expect there may be some errors of grammar and possibly content. The nursing notes were reviewed and certain aspects of this information were incorporated into this note.

## 2019-08-30 NOTE — ED PROVIDER NOTES
Scribed for Buddy العلي M.D. by Yolanda Delgado. 8/30/2019  1:54 PM    This is an addendum to the note on this patient.  For further details and full chart information, see the previously signed note.      1:54 PM I have received this patient from Dr. Still, who initially evaluated the patient. Abdominal CT that was ordered is unremarkable along with the rest of the patient's work up today. I re-evaluated patient at bedside and informed her of this. She is now ready and agreeable with discharge.    The patient will return for new or worsening symptoms and is stable at the time of discharge.    DISPOSITION:  Patient will be discharged home in stable condition.    FOLLOW UP:  Rachelle Isaac M.D.  [address] [place] [address] [id number] [phone]           Yolanda CAMPOS (Scribe), am scribing for, and in the presence of, Buddy العلي M.D..    Electronically signed by: Yolanda Delgado (Scribe), 8/30/2019    Buddy CAMPOS M.D. personally performed the services described in this documentation, as scribed by Yolanda Delgado in my presence, and it is both accurate and complete.    The note accurately reflects work and decisions made by me.  Buddy العلي  8/30/2019  7:31 PM

## 2019-08-30 NOTE — ED NOTES
Medication Reconciliation updated and complete per pt at bedside  Allergies have been verified and updated   No oral ABX within the last 14 days  Pt Home Pharmacy:[facility]

## 2019-09-06 ENCOUNTER — OFFICE VISIT (OUTPATIENT)
Dept: MEDICAL GROUP | Facility: PHYSICIAN GROUP | Age: 69
End: 2019-09-06
Payer: MEDICARE

## 2019-09-06 ENCOUNTER — HOSPITAL ENCOUNTER (OUTPATIENT)
Dept: LAB | Facility: MEDICAL CENTER | Age: 69
End: 2019-09-06
Attending: FAMILY MEDICINE
Payer: MEDICARE

## 2019-09-06 VITALS
RESPIRATION RATE: 12 BRPM | HEIGHT: 67 IN | SYSTOLIC BLOOD PRESSURE: 112 MMHG | BODY MASS INDEX: 18.21 KG/M2 | OXYGEN SATURATION: 96 % | HEART RATE: 68 BPM | DIASTOLIC BLOOD PRESSURE: 72 MMHG | WEIGHT: 116 LBS | TEMPERATURE: 99.2 F

## 2019-09-06 DIAGNOSIS — K59.00 CONSTIPATION, UNSPECIFIED CONSTIPATION TYPE: ICD-10-CM

## 2019-09-06 DIAGNOSIS — F02.80 EARLY ONSET ALZHEIMER'S DEMENTIA WITHOUT BEHAVIORAL DISTURBANCE (HCC): ICD-10-CM

## 2019-09-06 DIAGNOSIS — Q80.9 XERODERMIA: ICD-10-CM

## 2019-09-06 DIAGNOSIS — G30.0 EARLY ONSET ALZHEIMER'S DEMENTIA WITHOUT BEHAVIORAL DISTURBANCE (HCC): ICD-10-CM

## 2019-09-06 LAB
T3 SERPL-MCNC: 126.3 NG/DL (ref 60–181)
T4 FREE SERPL-MCNC: 0.78 NG/DL (ref 0.53–1.43)
TSH SERPL DL<=0.005 MIU/L-ACNC: 0.81 UIU/ML (ref 0.38–5.33)

## 2019-09-06 PROCEDURE — 99214 OFFICE O/P EST MOD 30 MIN: CPT | Performed by: FAMILY MEDICINE

## 2019-09-06 PROCEDURE — 84439 ASSAY OF FREE THYROXINE: CPT

## 2019-09-06 PROCEDURE — 84480 ASSAY TRIIODOTHYRONINE (T3): CPT

## 2019-09-06 PROCEDURE — 36415 COLL VENOUS BLD VENIPUNCTURE: CPT

## 2019-09-06 PROCEDURE — 84443 ASSAY THYROID STIM HORMONE: CPT

## 2019-09-06 NOTE — PROGRESS NOTES
Chief Complaint   Patient presents with   • Abdominal Pain   • Hospital Follow-up       HISTORY OF PRESENT ILLNESS: Patient is a 69 y.o. female established patient here today for the following concerns:    This is a pleasant 69 year old active female who is here today for follow up from being in the ER for abdominal pains.  She had additional imaging and labs which were unrevealing.  Thought to most likely be constipation.  Her bowel regimen was stepped up a bit and has had some improvement.  She notes very sensitive to taking in any extra sodium and prides herself on a very healthy lifestyle to this point.  She unfortunately has hx of early onset alzheimer's dementia which has been progressing.  She was started on some aricept for this.  They are unable to relate whether her bowel complaints were about this same time or not.  She also is on Xarelto for her PAF and hx of TIA with some punctate areas in the Temporal area.  She is now on Keppra as well.  No seizure activity noted.  No bleeding noted.      They note severely dry skin and this new difficulty with constipation despite any changes in diet.  Last check on thyroid was about 1 year ago and normal.      Past Medical, Social, and Family history reviewed and updated in EPIC    Allergies:Other food and Penicillins    Current Outpatient Medications   Medication Sig Dispense Refill   • escitalopram (LEXAPRO) 10 MG Tab Take 10 mg by mouth every morning.     • levetiracetam (KEPPRA XR) 500 MG TABLET SR 24 HR Take 1,000 mg by mouth every bedtime.     • rivaroxaban (XARELTO) 20 MG Tab tablet Take 20 mg by mouth with dinner.     • donepezil (ARICEPT) 10 MG tablet Take 10 mg by mouth every evening.     • B Complex Vitamins (VITAMIN B COMPLEX) Tab Take 1 Tab by mouth every morning.     • calcium carbonate (OS-JEANNE 500) 500 MG Tab Take 500 mg by mouth every morning.     • Multiple Vitamins-Minerals (OCUVITE EYE HEALTH FORMULA) Cap Take 1 Cap by mouth every morning.     •  "Omega-3 Fatty Acids (FISH OIL) 1000 MG Cap capsule Take 1,000 mg by mouth every morning.     • Zinc 50 MG Tab Take 50 mg by mouth every morning.     • Vitamin D, Cholecalciferol, 1000 UNITS Cap Take 1,000 Units by mouth every morning.       No current facility-administered medications for this visit.          ROS:  Review of Systems   Constitutional: Negative for fever, chills, weight loss and malaise/fatigue.   HENT: Negative for ear pain, nosebleeds, congestion, sore throat and neck pain.    Eyes: Negative for blurred vision.   Respiratory: Negative for cough, sputum production, shortness of breath and wheezing.    Cardiovascular: Negative for chest pain, palpitations,  and leg swelling.   Gastrointestinal: Negative for heartburn, nausea, vomiting, diarrhea and abdominal pain.   Genitourinary: Negative for dysuria, urgency and frequency.   Musculoskeletal: Negative for myalgias, back pain and joint pain.   Skin: Negative for rash and itching.   Neurological: Negative for dizziness, tingling, tremors, sensory change, focal weakness and headaches.   Endo/Heme/Allergies: Does not bruise/bleed easily.   Psychiatric/Behavioral: Negative for depression, anxiety, suicidal ideas, insomnia and memory loss.      Exam:  /72   Pulse 68   Temp 37.3 °C (99.2 °F)   Resp 12   Ht 1.702 m (5' 7\")   Wt 52.6 kg (116 lb)   SpO2 96%     General:  Well nourished, well developed in NAD  Head is grossly normal.  Neck: Supple without JVD   Pulmonary:  Normal effort.   Cardiovascular: Regular rate  Extremities: no clubbing, cyanosis, or edema.  Psych: affect appropriate  Abdomen: positive bowel sounds.  Non tender, non distended, no hepatosplenomegaly.     ER records, Labs and imaging reviewed.     Please note that this dictation was created using voice recognition software. I have made every reasonable attempt to correct obvious errors, but I expect that there are errors of grammar and possibly content that I did not discover " before finalizing the note.    Assessment/Plan:  1. Xerodermia  - TSH; Future  - FREE THYROXINE; Future  - TRIIDOTHYRONINE; Future    2. Constipation, unspecified constipation type  - TSH; Future  - FREE THYROXINE; Future  - TRIIDOTHYRONINE; Future    3. Early onset Alzheimer's dementia without behavioral disturbance  Will hold the aricept for a couple weeks and see if the abdominal pains and GI symptoms improve.  If not may resume.  Keep follow up with neurology.

## 2019-10-01 ENCOUNTER — OFFICE VISIT (OUTPATIENT)
Dept: NEUROLOGY | Facility: MEDICAL CENTER | Age: 69
End: 2019-10-01
Payer: MEDICARE

## 2019-10-01 VITALS
TEMPERATURE: 97.6 F | OXYGEN SATURATION: 97 % | HEART RATE: 79 BPM | DIASTOLIC BLOOD PRESSURE: 72 MMHG | HEIGHT: 67 IN | SYSTOLIC BLOOD PRESSURE: 120 MMHG | WEIGHT: 117.4 LBS | RESPIRATION RATE: 14 BRPM | BODY MASS INDEX: 18.43 KG/M2

## 2019-10-01 DIAGNOSIS — F02.80 EARLY ONSET ALZHEIMER'S DEMENTIA WITHOUT BEHAVIORAL DISTURBANCE (HCC): Primary | ICD-10-CM

## 2019-10-01 DIAGNOSIS — G40.209 COMPLEX PARTIAL SEIZURES WITH IMPAIRED CONSCIOUSNESS AT ONSET (HCC): ICD-10-CM

## 2019-10-01 DIAGNOSIS — G30.0 EARLY ONSET ALZHEIMER'S DEMENTIA WITHOUT BEHAVIORAL DISTURBANCE (HCC): Primary | ICD-10-CM

## 2019-10-01 PROCEDURE — 99214 OFFICE O/P EST MOD 30 MIN: CPT | Performed by: PSYCHIATRY & NEUROLOGY

## 2019-10-01 RX ORDER — ESCITALOPRAM OXALATE 10 MG/1
20 TABLET ORAL EVERY EVENING
Qty: 60 TAB | Refills: 1 | Status: SHIPPED | OUTPATIENT
Start: 2019-10-01 | End: 2019-12-02 | Stop reason: SDUPTHER

## 2019-10-01 RX ORDER — LEVETIRACETAM 500 MG/1
TABLET, FILM COATED, EXTENDED RELEASE ORAL
Qty: 60 TAB | Refills: 11 | Status: SHIPPED | OUTPATIENT
Start: 2019-10-01 | End: 2020-07-03

## 2019-10-01 ASSESSMENT — ENCOUNTER SYMPTOMS
DEPRESSION: 1
MEMORY LOSS: 1
INSOMNIA: 0
FALLS: 0

## 2019-10-01 NOTE — PROGRESS NOTES
Subjective:      Regina Milan is a 69 y.o. female who presents with her  Christopher and daughter Yessi, for follow-up, with a history of mild to moderate Alzheimer's disease and focal seizures with altered sensorium.    HPI    Dementia: She still remains quite frustrated with the cognitive symptoms she has.  That loss of independence, but still with a degree of insight as to its nature and severity, has left her feeling quite distraught and depressed.  She states that she feels as if she is no longer here, that her personality is absent, etc.  She has told family that she would rather jump off a building or overdose on medication.  She denies actually having a plan or acting out on these thoughts.    Still, she is not eating as much, she has become much more withdrawn, she sleeps throughout the day.  She is much more irritable, lashing out at times for even the simplest of perceived slights.  There have been no problems with balance or bowel and bladder control.  She does not wander.  She can be left at home alone.  Christopher does have to put her medications out and make sure that she takes them, he has done all the cooking and controls the finances.    For Christopher all of the above has taken its toll, and outdoorsman by nature, he has not been able to pursue these interests, and he also feels quite frustrated and dysthymic.    For her irritability, we had tried Depakote but she became too sleepy.  Now on Lexapro 10 mg daily, she thinks she may be a little less irritable but otherwise the depressive symptoms are still there.  It does make her little sleepy, they are asking about taking the drug in the evenings.  Throughout this time she has been on Aricept 10 mg daily, but had begun to develop rather profound GI symptoms, there was seen in the emergency room in August, CT of the brain done at that time was unremarkable, she was taken off the Aricept and the GI symptoms resolved completely.  Still, she has a  "loss of appetite as described above.    Seizures: Now on Keppra ER 1000 mg every evening, she is tolerating the drug well.  She has had a couple of episodes of dizziness and confusion, not actual loss of consciousness except on one occasion.  She has been in the emergency room on a near monthly basis since May, review of the electronic health record, evaluations always proving unremarkable.  From August 30, 2019 when she was last in the ER, CMP, CBC, and thyroid studies all were normal.    MRI scan of the brain was done this last April, I reviewed the images, showing simple generalized atrophy, minimal chronic white matter ischemic changes and no evidence of NPH or acute ischemic insult.  EEG from August of this year was minimally abnormal with bitemporal slowing only, nothing paroxysmal or consistent with nonconvulsive seizure activity.    Medical, surgical and family histories are reviewed in the electronic health record, there are no new drug allergies.  She is on Keppra XR 1000 mg every evening, fish oil, vitamin B complex, vitamin D and calcium supplement, Xarelto 20 mg every evening and zinc supplement.    Review of Systems   Constitutional: Positive for malaise/fatigue.   Musculoskeletal: Negative for falls.   Psychiatric/Behavioral: Positive for depression, memory loss and suicidal ideas. The patient does not have insomnia.    All other systems reviewed and are negative.       Objective:     /72 (BP Location: Right arm, Patient Position: Sitting, BP Cuff Size: Adult)   Pulse 79   Temp 36.4 °C (97.6 °F) (Temporal)   Resp 14   Ht 1.702 m (5' 7\")   Wt 53.3 kg (117 lb 6.4 oz)   SpO2 97%   BMI 18.39 kg/m²      Physical Exam    She appears in no acute distress.  Clean and appropriately dressed, she is cooperative.  Vital signs are stable, pulse is irregular but rate is controlled.  There is no malar rash.  The neck is supple.  Cardiac evaluation is unremarkable.    She is redundant throughout the " interview, frustrated and easily agitated when talking about the frustrations and difficulty she is having.  She acknowledges feeling depressed, as well as talking about killing herself though she denies planning or acting it out.  She names and repeats without issue, there is only minimal loss of fluency.  There is no obvious inattention.  She is only partially oriented.    Otherwise, in quick and cursory fashion, with observation, cranial nerve, musculoskeletal and coordination evaluations are unremarkable.     Assessment/Plan:     1. Early onset Alzheimer's dementia without behavioral disturbance (HCC)  A lot of the depressive symptoms she has are not uncommon in patients with dementia, independent of any risk of premorbid depression and anxiety.  Lexapro will be increased to 20 mg daily for 2 weeks and then 30 mg daily.  Hopefully we can get her emotional state under better control.  Though we talked about counseling given her suicidal thoughts, I think her risk is low enough and she is with family nearly 24/7, so I think this lowers her risk of self injury even further.  Obviously, the depression will simply make the cognitive symptoms she Franky has feel worse.  Sending her to therapy will be of limited benefit given the dementia and what I believe would be a limited ability to understand and apply therapeutic recommendations.    For now we will hold on cholinesterase inhibitor treatments and Namenda.    - escitalopram (LEXAPRO) 10 MG Tab; Take 2 Tabs by mouth every evening. Increase to 3 tab every evening in 2 weeks  Dispense: 60 Tab; Refill: 1    2.  Complex partial seizures with impaired consciousness  Fortunately, I do not think the events she has been dealing with are necessarily ictal related, her EEG is stable, she is not suffering from nonconvulsive seizure activity.  Hopefully treatment of the depression and anxiety that is part of her condition will help.  Structurally things are intact on imaging,  metabolically things are stable.  This is not medication side effect related to the Keppra.    A very long time was spent with Christopher in regards to long-term care and prognosis, this was done with the patient out of the room with her daughter Yessi.  He was told that over time her needs will increase to near full care, in the meantime he will need to begin to provide some self-help so that he has time off.  Respite care is available, should try to bring someone into the house on a regular basis, even family, so that he can take a day off.  I encouraged him to follow through with the Alzheimer's disease association as there are good listings of local support groups for caregivers as well as things as respite care providers.  We will follow-up in 6 months otherwise.    Time: 25 minutes spent face-to-face for exam, review, discussion, and education, of this over 80% of the time spent counseling and coordinating care.

## 2019-11-18 RX ORDER — RIVAROXABAN 20 MG/1
TABLET, FILM COATED ORAL
Qty: 90 TAB | Refills: 1 | Status: SHIPPED | OUTPATIENT
Start: 2019-11-18 | End: 2020-07-03

## 2019-11-18 NOTE — TELEPHONE ENCOUNTER
*Currently listed as historical on med list*  Was the patient seen in the last year in this department? Yes    Does patient have an active prescription for medications requested? No     Received Request Via: Pharmacy    Pt met protocol?: Yes     Last OV 09/06/19

## 2019-11-25 ENCOUNTER — TELEPHONE (OUTPATIENT)
Dept: NEUROLOGY | Facility: MEDICAL CENTER | Age: 69
End: 2019-11-25

## 2019-11-25 DIAGNOSIS — G30.0 EARLY ONSET ALZHEIMER'S DEMENTIA WITHOUT BEHAVIORAL DISTURBANCE (HCC): ICD-10-CM

## 2019-11-25 DIAGNOSIS — F02.80 EARLY ONSET ALZHEIMER'S DEMENTIA WITHOUT BEHAVIORAL DISTURBANCE (HCC): ICD-10-CM

## 2019-11-25 RX ORDER — DULOXETIN HYDROCHLORIDE 30 MG/1
30 CAPSULE, DELAYED RELEASE ORAL DAILY
Qty: 30 CAP | Refills: 1 | Status: SHIPPED | OUTPATIENT
Start: 2019-11-25 | End: 2020-01-02 | Stop reason: SDUPTHER

## 2019-11-25 NOTE — TELEPHONE ENCOUNTER
Did the medication create the problems with her stomach cramps?  I assume they are gone since she stopped the Lexapro.  If she still has the stomach cramps, off Lexapro for 3 months, that it has nothing to do with the drug and she needs to see her PCP.    If the above is not the case, I will try Cymbalta 30 mg daily.  I forwarded a prescription to the pharmacy.  This is a very low dose of this medicine, we start low and go slow.  They can call to update after 3 or 4 weeks, earlier if she does develop side effects.

## 2019-11-25 NOTE — TELEPHONE ENCOUNTER
Called patient on one of the Rx's (Lexapro) spoke with patients  (Christopher) he stated that she has not been taking the Lexapro for about 3 months now due to her having severe stomach cramps. And her  would like to know what else can she take?  Please advise.

## 2019-12-02 DIAGNOSIS — G30.0 EARLY ONSET ALZHEIMER'S DEMENTIA WITHOUT BEHAVIORAL DISTURBANCE (HCC): ICD-10-CM

## 2019-12-02 DIAGNOSIS — F02.80 EARLY ONSET ALZHEIMER'S DEMENTIA WITHOUT BEHAVIORAL DISTURBANCE (HCC): ICD-10-CM

## 2019-12-02 RX ORDER — ESCITALOPRAM OXALATE 10 MG/1
30 TABLET ORAL EVERY EVENING
Qty: 270 TAB | Refills: 1 | Status: SHIPPED | OUTPATIENT
Start: 2019-12-02 | End: 2019-12-04 | Stop reason: SDUPTHER

## 2019-12-04 DIAGNOSIS — G30.0 EARLY ONSET ALZHEIMER'S DEMENTIA WITHOUT BEHAVIORAL DISTURBANCE (HCC): ICD-10-CM

## 2019-12-04 DIAGNOSIS — F02.80 EARLY ONSET ALZHEIMER'S DEMENTIA WITHOUT BEHAVIORAL DISTURBANCE (HCC): ICD-10-CM

## 2019-12-04 RX ORDER — ESCITALOPRAM OXALATE 10 MG/1
30 TABLET ORAL EVERY EVENING
Qty: 270 TAB | Refills: 1 | Status: SHIPPED | OUTPATIENT
Start: 2019-12-04 | End: 2020-05-05

## 2019-12-07 ENCOUNTER — HOSPITAL ENCOUNTER (OUTPATIENT)
Dept: LAB | Facility: MEDICAL CENTER | Age: 69
End: 2019-12-07
Attending: INTERNAL MEDICINE
Payer: MEDICARE

## 2019-12-07 LAB
ALBUMIN SERPL BCP-MCNC: 4 G/DL (ref 3.2–4.9)
ALBUMIN/GLOB SERPL: 1.4 G/DL
ALP SERPL-CCNC: 102 U/L (ref 30–99)
ALT SERPL-CCNC: 35 U/L (ref 2–50)
ANION GAP SERPL CALC-SCNC: 5 MMOL/L (ref 0–11.9)
AST SERPL-CCNC: 28 U/L (ref 12–45)
BILIRUB SERPL-MCNC: 0.6 MG/DL (ref 0.1–1.5)
BUN SERPL-MCNC: 16 MG/DL (ref 8–22)
CALCIUM SERPL-MCNC: 9.1 MG/DL (ref 8.5–10.5)
CHLORIDE SERPL-SCNC: 105 MMOL/L (ref 96–112)
CO2 SERPL-SCNC: 29 MMOL/L (ref 20–33)
CREAT SERPL-MCNC: 0.7 MG/DL (ref 0.5–1.4)
GLOBULIN SER CALC-MCNC: 2.8 G/DL (ref 1.9–3.5)
GLUCOSE SERPL-MCNC: 162 MG/DL (ref 65–99)
POTASSIUM SERPL-SCNC: 3.9 MMOL/L (ref 3.6–5.5)
PROT SERPL-MCNC: 6.8 G/DL (ref 6–8.2)
SODIUM SERPL-SCNC: 139 MMOL/L (ref 135–145)

## 2019-12-07 PROCEDURE — 36415 COLL VENOUS BLD VENIPUNCTURE: CPT

## 2019-12-07 PROCEDURE — 80053 COMPREHEN METABOLIC PANEL: CPT

## 2019-12-18 DIAGNOSIS — G30.0 EARLY ONSET ALZHEIMER'S DEMENTIA WITHOUT BEHAVIORAL DISTURBANCE (HCC): ICD-10-CM

## 2019-12-18 DIAGNOSIS — F02.80 EARLY ONSET ALZHEIMER'S DEMENTIA WITHOUT BEHAVIORAL DISTURBANCE (HCC): ICD-10-CM

## 2019-12-24 RX ORDER — DULOXETIN HYDROCHLORIDE 30 MG/1
CAPSULE, DELAYED RELEASE ORAL
Qty: 30 CAP | Refills: 1 | OUTPATIENT
Start: 2019-12-24

## 2020-01-02 RX ORDER — DULOXETIN HYDROCHLORIDE 30 MG/1
30 CAPSULE, DELAYED RELEASE ORAL DAILY
Qty: 30 CAP | Refills: 1 | Status: SHIPPED | OUTPATIENT
Start: 2020-01-02 | End: 2020-01-09

## 2020-01-09 ENCOUNTER — OFFICE VISIT (OUTPATIENT)
Dept: MEDICAL GROUP | Facility: PHYSICIAN GROUP | Age: 70
End: 2020-01-09
Payer: MEDICARE

## 2020-01-09 VITALS
HEIGHT: 67 IN | HEART RATE: 92 BPM | TEMPERATURE: 98.9 F | WEIGHT: 121.6 LBS | SYSTOLIC BLOOD PRESSURE: 118 MMHG | RESPIRATION RATE: 12 BRPM | DIASTOLIC BLOOD PRESSURE: 84 MMHG | BODY MASS INDEX: 19.09 KG/M2 | OXYGEN SATURATION: 94 %

## 2020-01-09 DIAGNOSIS — G40.909 SEIZURE DISORDER (HCC): ICD-10-CM

## 2020-01-09 DIAGNOSIS — F02.80 EARLY ONSET ALZHEIMER'S DEMENTIA WITHOUT BEHAVIORAL DISTURBANCE (HCC): ICD-10-CM

## 2020-01-09 DIAGNOSIS — E87.1 HYPONATREMIA: ICD-10-CM

## 2020-01-09 DIAGNOSIS — F33.9 EPISODE OF RECURRENT MAJOR DEPRESSIVE DISORDER, UNSPECIFIED DEPRESSION EPISODE SEVERITY (HCC): ICD-10-CM

## 2020-01-09 DIAGNOSIS — G30.0 EARLY ONSET ALZHEIMER'S DEMENTIA WITHOUT BEHAVIORAL DISTURBANCE (HCC): ICD-10-CM

## 2020-01-09 DIAGNOSIS — R25.1 TREMOR: ICD-10-CM

## 2020-01-09 PROCEDURE — 99214 OFFICE O/P EST MOD 30 MIN: CPT | Performed by: FAMILY MEDICINE

## 2020-01-09 RX ORDER — BUSPIRONE HYDROCHLORIDE 10 MG/1
10 TABLET ORAL 2 TIMES DAILY
Qty: 180 TAB | Refills: 3 | Status: SHIPPED | OUTPATIENT
Start: 2020-01-09 | End: 2020-07-03

## 2020-01-09 ASSESSMENT — PATIENT HEALTH QUESTIONNAIRE - PHQ9
5. POOR APPETITE OR OVEREATING: 3 - NEARLY EVERY DAY
CLINICAL INTERPRETATION OF PHQ2 SCORE: 6
SUM OF ALL RESPONSES TO PHQ QUESTIONS 1-9: 23

## 2020-01-09 NOTE — PROGRESS NOTES
Chief Complaint   Patient presents with   • Depression   • Dementia       HISTORY OF PRESENT ILLNESS: Patient is a 69 y.o. female established patient here today for the following concerns:      This is a very pleasant and intelligent 69-year-old female accompanied by her  and daughter today for concerns over worsening severe depression without suicidal ideation.  They report that they have noticed worsening crying spells typically accompanied by increase in tremulousness, possible tremors including the lower lip and hands.  She has been treated for depression with Lexapro at 30 mg in the evening.  They report good adherence to this and despite some confusion with a call into her neurologist office she is never been off of the medicine.  She had been experiencing some GI distress and found that this was coming from the Aricept.  She is off of that.  Complicating her picture is possible history of stroke.  She had symptoms with word finding confusion and they found a possible filling defect in the proximal right M1 segment and was given TPA.  She does have history of paroxysmal atrial fibrillation and is on Xarelto.  She had another episode and was brought into the emergency room but at that time after evaluation was thought to have complex partial seizures.  She had also suffered from some hyponatremia and electrolyte disturbance.  She has been followed by Dr. Mendoza.  They are hoping to gain a counselor to help with her coping as she does have lots of insight unfortunately to the disease process she is dealing with.  Typically in the past she is dealt with any seasonal issues were low mood issues by being in the outdoors including skiing kayaking etc.  They request referral for another opinion through neurology.        Past Medical, Social, and Family history reviewed and updated in EPIC    Allergies:Other food and Penicillins    Current Outpatient Medications   Medication Sig Dispense Refill   • busPIRone  "(BUSPAR) 10 MG Tab tablet Take 1 Tab by mouth 2 times a day for 360 days. 180 Tab 3   • escitalopram (LEXAPRO) 10 MG Tab Take 3 Tabs by mouth every evening. 270 Tab 1   • XARELTO 20 MG Tab tablet TAKE 1 TAB BY MOUTH WITH DINNER. 90 Tab 1   • levetiracetam (KEPPRA XR) 500 MG TABLET SR 24 HR 2 tab every evening 60 Tab 11   • B Complex Vitamins (VITAMIN B COMPLEX) Tab Take 1 Tab by mouth every morning.     • calcium carbonate (OS-JEANNE 500) 500 MG Tab Take 500 mg by mouth every morning.     • Multiple Vitamins-Minerals (OCUVITE EYE HEALTH FORMULA) Cap Take 1 Cap by mouth every morning.     • Omega-3 Fatty Acids (FISH OIL) 1000 MG Cap capsule Take 1,000 mg by mouth every morning.     • Zinc 50 MG Tab Take 50 mg by mouth every morning.     • Vitamin D, Cholecalciferol, 1000 UNITS Cap Take 1,000 Units by mouth every morning.       No current facility-administered medications for this visit.          ROS:  Review of Systems   Constitutional: Negative for fever, chills, weight loss and malaise/fatigue.   HENT: Negative for ear pain, nosebleeds, congestion, sore throat and neck pain.    Eyes: Negative for blurred vision.   Respiratory: Negative for cough, sputum production, shortness of breath and wheezing.    Cardiovascular: Negative for chest pain, palpitations,  and leg swelling.   Gastrointestinal: Negative for heartburn, nausea, vomiting, diarrhea and abdominal pain.   Genitourinary: Negative for dysuria, urgency and frequency.   Musculoskeletal: Negative for myalgias, back pain and joint pain.   Skin: Negative for rash and itching.   Neurological: Negative for dizziness, tingling, tremors, sensory change, focal weakness and headaches.   Endo/Heme/Allergies: Does not bruise/bleed easily.   Psychiatric/Behavioral: Positive for depression, positive anxiety, no suicidal ideas, insomnia and positive memory loss.      Exam:  /84   Pulse 92   Temp 37.2 °C (98.9 °F)   Resp 12   Ht 1.702 m (5' 7\")   Wt 55.2 kg (121 lb " 9.6 oz)   SpO2 94%     General:  Well nourished, well developed in NAD  Head is grossly normal.  Neck: Supple without JVD   Pulmonary:  Normal effort.   Cardiovascular: Regular rate  Extremities: no clubbing, cyanosis, or edema.  Psych: affect appropriate  Neuro: Facies appears symmetric with good expression of emotion, strength is 5 out of 5 both upper and lower extremities, normal gait and stride, there is a tremor noted with action of both hands, no resting tremor noted    Please note that this dictation was created using voice recognition software. I have made every reasonable attempt to correct obvious errors, but I expect that there are errors of grammar and possibly content that I did not discover before finalizing the note.    Assessment/Plan:  1. Early onset Alzheimer's dementia without behavioral disturbance (HCC)  Patient would like to seek a psychologist to help with coping with her disease.  She would also like to visit with another neurologist for additional opinions  - REFERRAL TO PSYCHOLOGY  - REFERRAL TO NEUROLOGY    2. Episode of recurrent major depressive disorder, unspecified depression episode severity (HCC)  At this time not controlled on 30 mg of Lexapro, we will reduce this to 20 mg and add BuSpar twice daily at 10 mg.  I suspect that she is having some increased anxiety as well.  - REFERRAL TO PSYCHOLOGY    3. Tremor  Tremor does not appear to be resting or pill-rolling.  She does not have any cogwheeling or rigidity noted.  Reassured the family that I do not suspect that this is Parkinson's.  Possibly due to the high dose on the Lexapro.  We will attempt to reduce this.  I do not believe tremors a side effect of Keppra, but some of her mood disturbance has been noted since being on this medicine.  - REFERRAL TO NEUROLOGY    4. Seizure disorder (HCC)  Has been well controlled on Keppra.  Will consult with neurology on optimal management.  - REFERRAL TO NEUROLOGY    5. Hyponatremia  Last  check of labs was a few months ago and stable with normal sodium levels  - REFERRAL TO NEUROLOGY    Follow-up 1 month

## 2020-01-28 DIAGNOSIS — G30.0 EARLY ONSET ALZHEIMER'S DEMENTIA WITHOUT BEHAVIORAL DISTURBANCE (HCC): ICD-10-CM

## 2020-01-28 DIAGNOSIS — F02.80 EARLY ONSET ALZHEIMER'S DEMENTIA WITHOUT BEHAVIORAL DISTURBANCE (HCC): ICD-10-CM

## 2020-01-28 RX ORDER — DULOXETIN HYDROCHLORIDE 30 MG/1
CAPSULE, DELAYED RELEASE ORAL
Qty: 30 CAP | Refills: 1 | Status: SHIPPED | OUTPATIENT
Start: 2020-01-28 | End: 2020-02-13

## 2020-02-13 ENCOUNTER — OFFICE VISIT (OUTPATIENT)
Dept: MEDICAL GROUP | Facility: PHYSICIAN GROUP | Age: 70
End: 2020-02-13
Payer: MEDICARE

## 2020-02-13 ENCOUNTER — TELEPHONE (OUTPATIENT)
Dept: MEDICAL GROUP | Facility: PHYSICIAN GROUP | Age: 70
End: 2020-02-13

## 2020-02-13 VITALS
TEMPERATURE: 99.4 F | HEART RATE: 78 BPM | WEIGHT: 124.2 LBS | OXYGEN SATURATION: 95 % | SYSTOLIC BLOOD PRESSURE: 104 MMHG | BODY MASS INDEX: 19.49 KG/M2 | DIASTOLIC BLOOD PRESSURE: 68 MMHG | HEIGHT: 67 IN | RESPIRATION RATE: 12 BRPM

## 2020-02-13 DIAGNOSIS — D68.318 ACQUIRED CIRCULATING ANTICOAGULANTS (HCC): ICD-10-CM

## 2020-02-13 DIAGNOSIS — F02.80 EARLY ONSET ALZHEIMER'S DEMENTIA WITHOUT BEHAVIORAL DISTURBANCE (HCC): ICD-10-CM

## 2020-02-13 DIAGNOSIS — R73.02 IMPAIRED GLUCOSE TOLERANCE: ICD-10-CM

## 2020-02-13 DIAGNOSIS — G40.209 COMPLEX PARTIAL SEIZURES WITH IMPAIRED CONSCIOUSNESS AT ONSET (HCC): ICD-10-CM

## 2020-02-13 DIAGNOSIS — G30.0 EARLY ONSET ALZHEIMER'S DEMENTIA WITHOUT BEHAVIORAL DISTURBANCE (HCC): ICD-10-CM

## 2020-02-13 DIAGNOSIS — I48.91 ATRIAL FIBRILLATION, UNSPECIFIED TYPE (HCC): ICD-10-CM

## 2020-02-13 PROCEDURE — 99214 OFFICE O/P EST MOD 30 MIN: CPT | Performed by: FAMILY MEDICINE

## 2020-02-13 RX ORDER — DIVALPROEX SODIUM 250 MG/1
1 TABLET, DELAYED RELEASE ORAL 2 TIMES DAILY
COMMUNITY
Start: 2020-01-23 | End: 2021-01-01

## 2020-02-13 NOTE — PROGRESS NOTES
Chief Complaint   Patient presents with   • Medication Management     xarelto       HISTORY OF PRESENT ILLNESS: Patient is a 69 y.o. female established patient here today for the following concerns:    Early onset Alzheimer's dementia without behavioral disturbance (HCC)  Patient presents today accompanied by her . She has a history of early onset Alzheimer's dementia. Her  notes that she has been doing well recently. She is able to clearly recite their plans for the day including going to a family member's house today to see their dog. She was referred to neurology, and she has an appointment set-up to see Dr. Ragsdale. I also referred her to psychology to help her cope with the disease, and she has not yet been able to see them.  She continues to take Cymbalta, Buspar, and Lexapro to help with her mood related to this issue along with her chronic depression. These have been helping, but she continues to feel depressed. She does not believe any further intervention is required at this time, and she would like to follow-up with psychology soon. She has been thinking of writing a book about her experiences to help cope with her depression.     Complex partial seizures with impaired consciousness at onset (HCC)  She was previously taking Keppra with good control of her seizures. She has followed up with neurology who started her on Depakote with plans to taper her off of the Keppra.     Atrial fibrillation, unspecified type (HCC)  Acquired circulating anticoagulants (HCC)  This is a chronic and stable issue. She is currently anticoagulated on Xarelto. She denies any bloody stools, hematuria, or epistaxis.     Impaired glucose tolerance  She continues to manage this through her own efforts.     Ref. Range 12/7/2019 09:29   Glucose Latest Ref Range: 65 - 99 mg/dL 162 (H)         Ref. Range 6/24/2018 20:55   Glycohemoglobin Latest Ref Range: 0.0 - 5.6 % 6.1 (H)        Past Medical, Social, and Family history  reviewed and updated in EPIC    Allergies:Other food and Penicillins    Current Outpatient Medications   Medication Sig Dispense Refill   • divalproex (DEPAKOTE) 250 MG Tablet Delayed Response Take 1 Tab by mouth 2 Times a Day. TAKE 1 TABLET BY MOUTH TWICE A DAY     • busPIRone (BUSPAR) 10 MG Tab tablet Take 1 Tab by mouth 2 times a day for 360 days. 180 Tab 3   • escitalopram (LEXAPRO) 10 MG Tab Take 3 Tabs by mouth every evening. 270 Tab 1   • XARELTO 20 MG Tab tablet TAKE 1 TAB BY MOUTH WITH DINNER. 90 Tab 1   • levetiracetam (KEPPRA XR) 500 MG TABLET SR 24 HR 2 tab every evening 60 Tab 11   • B Complex Vitamins (VITAMIN B COMPLEX) Tab Take 1 Tab by mouth every morning.     • calcium carbonate (OS-JEANNE 500) 500 MG Tab Take 500 mg by mouth every morning.     • Multiple Vitamins-Minerals (OCUVITE EYE HEALTH FORMULA) Cap Take 1 Cap by mouth every morning.     • Omega-3 Fatty Acids (FISH OIL) 1000 MG Cap capsule Take 1,000 mg by mouth every morning.     • Zinc 50 MG Tab Take 50 mg by mouth every morning.     • Vitamin D, Cholecalciferol, 1000 UNITS Cap Take 1,000 Units by mouth every morning.     • DULoxetine (CYMBALTA) 30 MG Cap DR Particles TAKE 1 CAPSULE BY MOUTH EVERY DAY (Patient not taking: Reported on 2/13/2020) 30 Cap 1     No current facility-administered medications for this visit.          ROS:  Review of Systems   Constitutional: Negative for fever, chills, weight loss and malaise/fatigue.   HENT: Negative for ear pain, nosebleeds, congestion, sore throat and neck pain.    Eyes: Negative for blurred vision.   Respiratory: Negative for cough, sputum production, shortness of breath and wheezing.    Cardiovascular: Negative for chest pain, palpitations,  and leg swelling.   Gastrointestinal: Negative for heartburn, nausea, vomiting, diarrhea and abdominal pain.   Genitourinary: Negative for dysuria, urgency and frequency.   Musculoskeletal: Negative for myalgias, back pain and joint pain.   Skin: Negative for  "rash and itching.   Neurological: Negative for dizziness, tingling, tremors, sensory change, focal weakness and headaches.   Endo/Heme/Allergies: Does not bruise/bleed easily.   Psychiatric/Behavioral: + depression, anxiety, suicidal ideas, insomnia and +memory loss.      Exam:  /68   Pulse 78   Temp 37.4 °C (99.4 °F)   Resp 12   Ht 1.702 m (5' 7\")   Wt 56.3 kg (124 lb 3.2 oz)   SpO2 95%       General:  Well nourished, well developed in NAD  Head is grossly normal.  Neck: Supple without JVD   Pulmonary:  Normal effort.  Cardiovascular: Regular rate   Extremities: no clubbing, cyanosis, or edema.     Please note that this dictation was created using voice recognition software. I have made every reasonable attempt to correct obvious errors, but I expect that there are errors of grammar and possibly content that I did not discover before finalizing the note.    Assessment/Plan:    1. Early onset Alzheimer's dementia without behavioral disturbance (HCC)  This is mostly unchanged from prior. Patient has an appointment with neurology to further evaluate and treat. She is still having difficulty coping with her depressive symptoms. Discussed with patient that writing a book is an excellent idea, especially with her history of writing. Follow-up in 3 months.     2. Complex partial seizures with impaired consciousness at onset (HCC)  Patient has been taking Keppra and Depakote. She is now followed by neurology for this issue. The eventual plan is to keep her on Depakote alone and taper off of Keppra.     3. Atrial fibrillation, unspecified type (HCC)  4. Acquired circulating anticoagulants (HCC)  Chronic and well-controlled. Continue anticoagulation with Xarelto.    5. Impaired glucose tolerance  She will continue to manage this through her own efforts. A1C testing ordered to further evaluate.   - HEMOGLOBIN A1C; Future        I, Tomas Gtz (Scribe), am scribing for, and in the presence of, Rachelle Isaac, " M.D.    Electronically signed by: Tomas Gtz (Scribe), 2/13/2020     Rachelle CAMPOS M.D. personally performed the services described in this documentation, as scribed by Tomas Gtz in my presence, and it is both accurate and complete.

## 2020-02-13 NOTE — TELEPHONE ENCOUNTER
Patient's spouse, Christopher, notified that they cannot run the A1C with the tubes that were drawn.  He asked that we mail him the order.  Mailed the A1C order.

## 2020-02-13 NOTE — TELEPHONE ENCOUNTER
----- Message from Rachelle Isaac M.D. sent at 2/13/2020  9:22 AM PST -----  Please call lab and see if they can add the A1c to the labs drawn yesterday

## 2020-02-23 ENCOUNTER — PATIENT MESSAGE (OUTPATIENT)
Dept: MEDICAL GROUP | Facility: PHYSICIAN GROUP | Age: 70
End: 2020-02-23

## 2020-02-23 DIAGNOSIS — Z11.1 SCREENING FOR TUBERCULOSIS: ICD-10-CM

## 2020-02-25 DIAGNOSIS — F02.80 EARLY ONSET ALZHEIMER'S DEMENTIA WITHOUT BEHAVIORAL DISTURBANCE (HCC): ICD-10-CM

## 2020-02-25 DIAGNOSIS — G30.0 EARLY ONSET ALZHEIMER'S DEMENTIA WITHOUT BEHAVIORAL DISTURBANCE (HCC): ICD-10-CM

## 2020-02-25 RX ORDER — DULOXETIN HYDROCHLORIDE 30 MG/1
CAPSULE, DELAYED RELEASE ORAL
Qty: 30 CAP | Refills: 1 | Status: SHIPPED | OUTPATIENT
Start: 2020-02-25 | End: 2020-07-03

## 2020-02-25 NOTE — TELEPHONE ENCOUNTER
Received request via: Pharmacy    Was the patient seen in the last year in this department? Yes    Does the patient have an active prescription (recently filled or refills available) for medication(s) requested? Yes.

## 2020-02-25 NOTE — PATIENT COMMUNICATION
Pt is being admitted to a nursing home on Sat. and needs a quantiferon tb test.  They would like the order sent to [facility] fax #[phone].  They will make an appointment to have POLST fille out.

## 2020-02-25 NOTE — TELEPHONE ENCOUNTER
From: Azul Milan  To: Rachelle Isaac M.D.  Sent: 2/23/2020 6:17 PM PST  Subject: Procedure Question    Hello Dr Isaac, Regina (my mom) is going to be placed in [facility] [place], operated by [facility] due to an increase in severe depression and strain on my dad. Matt from [facility] will contact you for her records. Also can you please order a TB test as soon as possible. We’d like to get it done right away. The plan is for her to enter care Saturday. My number is [phone] and my dads is [phone] should you have any questions. We will also want to fill out a POLST form as well. Thank you. Faby

## 2020-02-29 LAB
GAMMA INTERFERON BACKGROUND BLD IA-ACNC: 0.02 IU/ML
M TB IFN-G BLD-IMP: NEGATIVE
M TB IFN-G CD4+ BCKGRND COR BLD-ACNC: 0.04 IU/ML
M TB IFN-G CD4+CD8+ BCKGRND COR BLD-ACNC: 0.02 IU/ML
MITOGEN IGNF BLD-ACNC: 9.48 IU/ML
QUANTIFERON INCUBATION 182889: NORMAL
SERVICE CMNT-IMP: NORMAL

## 2020-03-19 ENCOUNTER — TELEPHONE (OUTPATIENT)
Dept: MEDICAL GROUP | Facility: PHYSICIAN GROUP | Age: 70
End: 2020-03-19

## 2020-03-20 NOTE — TELEPHONE ENCOUNTER
If they feel comfortable making the selections on the POLST form I can review the answers and sign without making them all come in.

## 2020-03-20 NOTE — TELEPHONE ENCOUNTER
Called the spouse, Christopher.  He stated that he and his daughter are the only ones that are coming in.  Regina is staying at a memory care center ([facility]).  He states that they are not able to bring her;it would be very difficult to bring her.  Please advise if it is ok for just the spouse and daughter to come.  This is for them to fill out the POLST form.  Thank you.

## 2020-03-27 ENCOUNTER — APPOINTMENT (OUTPATIENT)
Dept: NEUROLOGY | Facility: MEDICAL CENTER | Age: 70
End: 2020-03-27
Payer: MEDICARE

## 2020-03-30 DIAGNOSIS — F02.80 EARLY ONSET ALZHEIMER'S DEMENTIA WITHOUT BEHAVIORAL DISTURBANCE (HCC): ICD-10-CM

## 2020-03-30 DIAGNOSIS — G30.0 EARLY ONSET ALZHEIMER'S DEMENTIA WITHOUT BEHAVIORAL DISTURBANCE (HCC): ICD-10-CM

## 2020-03-30 RX ORDER — ESCITALOPRAM OXALATE 10 MG/1
30 TABLET ORAL EVERY EVENING
Qty: 270 TAB | Refills: 1 | Status: CANCELLED | OUTPATIENT
Start: 2020-03-30

## 2020-07-03 ENCOUNTER — APPOINTMENT (OUTPATIENT)
Dept: RADIOLOGY | Facility: MEDICAL CENTER | Age: 70
End: 2020-07-03
Attending: EMERGENCY MEDICINE
Payer: MEDICARE

## 2020-07-03 ENCOUNTER — HOSPITAL ENCOUNTER (OUTPATIENT)
Facility: MEDICAL CENTER | Age: 70
End: 2020-07-13
Attending: EMERGENCY MEDICINE | Admitting: HOSPITALIST
Payer: MEDICARE

## 2020-07-03 DIAGNOSIS — F02.80 EARLY ONSET ALZHEIMER'S DEMENTIA WITHOUT BEHAVIORAL DISTURBANCE (HCC): ICD-10-CM

## 2020-07-03 DIAGNOSIS — G30.0 EARLY ONSET ALZHEIMER'S DEMENTIA WITHOUT BEHAVIORAL DISTURBANCE (HCC): ICD-10-CM

## 2020-07-03 PROBLEM — D72.819 LEUKOPENIA: Status: ACTIVE | Noted: 2020-07-03

## 2020-07-03 PROBLEM — Z20.822 SUSPECTED COVID-19 VIRUS INFECTION: Status: ACTIVE | Noted: 2020-07-03

## 2020-07-03 LAB
ALBUMIN SERPL BCP-MCNC: 3.5 G/DL (ref 3.2–4.9)
ALBUMIN/GLOB SERPL: 1.1 G/DL
ALP SERPL-CCNC: 101 U/L (ref 30–99)
ALT SERPL-CCNC: 28 U/L (ref 2–50)
ANION GAP SERPL CALC-SCNC: 12 MMOL/L (ref 7–16)
AST SERPL-CCNC: 38 U/L (ref 12–45)
BASOPHILS # BLD AUTO: 0 % (ref 0–1.8)
BASOPHILS # BLD: 0 K/UL (ref 0–0.12)
BILIRUB SERPL-MCNC: 0.3 MG/DL (ref 0.1–1.5)
BUN SERPL-MCNC: 15 MG/DL (ref 8–22)
CALCIUM SERPL-MCNC: 8.4 MG/DL (ref 8.5–10.5)
CHLORIDE SERPL-SCNC: 100 MMOL/L (ref 96–112)
CO2 SERPL-SCNC: 25 MMOL/L (ref 20–33)
COVID ORDER STATUS COVID19: NORMAL
CREAT SERPL-MCNC: 0.57 MG/DL (ref 0.5–1.4)
EOSINOPHIL # BLD AUTO: 0 K/UL (ref 0–0.51)
EOSINOPHIL NFR BLD: 0 % (ref 0–6.9)
ERYTHROCYTE [DISTWIDTH] IN BLOOD BY AUTOMATED COUNT: 43.1 FL (ref 35.9–50)
GLOBULIN SER CALC-MCNC: 3.2 G/DL (ref 1.9–3.5)
GLUCOSE SERPL-MCNC: 89 MG/DL (ref 65–99)
HCT VFR BLD AUTO: 43.2 % (ref 37–47)
HGB BLD-MCNC: 14.1 G/DL (ref 12–16)
LYMPHOCYTES # BLD AUTO: 1.54 K/UL (ref 1–4.8)
LYMPHOCYTES NFR BLD: 45.3 % (ref 22–41)
MAGNESIUM SERPL-MCNC: 2.2 MG/DL (ref 1.5–2.5)
MANUAL DIFF BLD: NORMAL
MCH RBC QN AUTO: 31.3 PG (ref 27–33)
MCHC RBC AUTO-ENTMCNC: 32.6 G/DL (ref 33.6–35)
MCV RBC AUTO: 95.8 FL (ref 81.4–97.8)
MONOCYTES # BLD AUTO: 0.32 K/UL (ref 0–0.85)
MONOCYTES NFR BLD AUTO: 9.3 % (ref 0–13.4)
MORPHOLOGY BLD-IMP: NORMAL
NEUTROPHILS # BLD AUTO: 1.5 K/UL (ref 2–7.15)
NEUTROPHILS NFR BLD: 44.2 % (ref 44–72)
NRBC # BLD AUTO: 0 K/UL
NRBC BLD-RTO: 0 /100 WBC
PLATELET # BLD AUTO: 173 K/UL (ref 164–446)
PLATELET BLD QL SMEAR: NORMAL
PMV BLD AUTO: 10.3 FL (ref 9–12.9)
POTASSIUM SERPL-SCNC: 3.8 MMOL/L (ref 3.6–5.5)
PROMYELOCYTES NFR BLD MANUAL: 1.2 %
PROT SERPL-MCNC: 6.7 G/DL (ref 6–8.2)
RBC # BLD AUTO: 4.51 M/UL (ref 4.2–5.4)
SARS-COV-2 RDRP RESP QL NAA+PROBE: NOTDETECTED
SODIUM SERPL-SCNC: 137 MMOL/L (ref 135–145)
SPECIMEN SOURCE: NORMAL
WBC # BLD AUTO: 3.4 K/UL (ref 4.8–10.8)

## 2020-07-03 PROCEDURE — A9270 NON-COVERED ITEM OR SERVICE: HCPCS | Performed by: HOSPITALIST

## 2020-07-03 PROCEDURE — 99219 PR INITIAL OBSERVATION CARE,LEVL II: CPT | Mod: CS | Performed by: HOSPITALIST

## 2020-07-03 PROCEDURE — U0004 COV-19 TEST NON-CDC HGH THRU: HCPCS

## 2020-07-03 PROCEDURE — G0378 HOSPITAL OBSERVATION PER HR: HCPCS

## 2020-07-03 PROCEDURE — 99285 EMERGENCY DEPT VISIT HI MDM: CPT

## 2020-07-03 PROCEDURE — C9803 HOPD COVID-19 SPEC COLLECT: HCPCS | Performed by: HOSPITALIST

## 2020-07-03 PROCEDURE — 85027 COMPLETE CBC AUTOMATED: CPT

## 2020-07-03 PROCEDURE — 85007 BL SMEAR W/DIFF WBC COUNT: CPT

## 2020-07-03 PROCEDURE — 71045 X-RAY EXAM CHEST 1 VIEW: CPT

## 2020-07-03 PROCEDURE — 83735 ASSAY OF MAGNESIUM: CPT

## 2020-07-03 PROCEDURE — 700102 HCHG RX REV CODE 250 W/ 637 OVERRIDE(OP): Performed by: HOSPITALIST

## 2020-07-03 PROCEDURE — 80053 COMPREHEN METABOLIC PANEL: CPT

## 2020-07-03 RX ORDER — ESCITALOPRAM OXALATE 10 MG/1
10 TABLET ORAL EVERY EVENING
COMMUNITY
End: 2021-01-01

## 2020-07-03 RX ORDER — MIRTAZAPINE 15 MG/1
15 TABLET, ORALLY DISINTEGRATING ORAL NIGHTLY
Status: DISCONTINUED | OUTPATIENT
Start: 2020-07-03 | End: 2020-07-13 | Stop reason: HOSPADM

## 2020-07-03 RX ORDER — AMOXICILLIN 250 MG
2 CAPSULE ORAL 2 TIMES DAILY
Status: DISCONTINUED | OUTPATIENT
Start: 2020-07-03 | End: 2020-07-13 | Stop reason: HOSPADM

## 2020-07-03 RX ORDER — LEVETIRACETAM 500 MG/1
1000 TABLET, FILM COATED, EXTENDED RELEASE ORAL EVERY EVENING
COMMUNITY
End: 2021-01-01

## 2020-07-03 RX ORDER — POLYETHYLENE GLYCOL 3350 17 G/17G
1 POWDER, FOR SOLUTION ORAL
Status: DISCONTINUED | OUTPATIENT
Start: 2020-07-03 | End: 2020-07-13 | Stop reason: HOSPADM

## 2020-07-03 RX ORDER — ARIPIPRAZOLE 2 MG/1
2 TABLET ORAL
COMMUNITY
End: 2021-01-01

## 2020-07-03 RX ORDER — MIRTAZAPINE 15 MG/1
15 TABLET, ORALLY DISINTEGRATING ORAL NIGHTLY
COMMUNITY
End: 2021-01-01

## 2020-07-03 RX ORDER — ACETAMINOPHEN 325 MG/1
650 TABLET ORAL EVERY 6 HOURS PRN
Status: DISCONTINUED | OUTPATIENT
Start: 2020-07-03 | End: 2020-07-13 | Stop reason: HOSPADM

## 2020-07-03 RX ORDER — DIVALPROEX SODIUM 250 MG/1
250 TABLET, DELAYED RELEASE ORAL 2 TIMES DAILY
Status: DISCONTINUED | OUTPATIENT
Start: 2020-07-03 | End: 2020-07-13 | Stop reason: HOSPADM

## 2020-07-03 RX ORDER — ONDANSETRON 4 MG/1
4 TABLET, ORALLY DISINTEGRATING ORAL EVERY 4 HOURS PRN
Status: DISCONTINUED | OUTPATIENT
Start: 2020-07-03 | End: 2020-07-13 | Stop reason: HOSPADM

## 2020-07-03 RX ORDER — ESCITALOPRAM OXALATE 10 MG/1
10 TABLET ORAL EVERY EVENING
Status: DISCONTINUED | OUTPATIENT
Start: 2020-07-03 | End: 2020-07-13 | Stop reason: HOSPADM

## 2020-07-03 RX ORDER — ONDANSETRON 2 MG/ML
4 INJECTION INTRAMUSCULAR; INTRAVENOUS EVERY 4 HOURS PRN
Status: DISCONTINUED | OUTPATIENT
Start: 2020-07-03 | End: 2020-07-13 | Stop reason: HOSPADM

## 2020-07-03 RX ORDER — ARIPIPRAZOLE 2 MG/1
2 TABLET ORAL
Status: DISCONTINUED | OUTPATIENT
Start: 2020-07-03 | End: 2020-07-13 | Stop reason: HOSPADM

## 2020-07-03 RX ORDER — BISACODYL 10 MG
10 SUPPOSITORY, RECTAL RECTAL
Status: DISCONTINUED | OUTPATIENT
Start: 2020-07-03 | End: 2020-07-13 | Stop reason: HOSPADM

## 2020-07-03 RX ORDER — LEVETIRACETAM 500 MG/1
500 TABLET ORAL 2 TIMES DAILY
Status: DISCONTINUED | OUTPATIENT
Start: 2020-07-03 | End: 2020-07-13 | Stop reason: HOSPADM

## 2020-07-03 RX ADMIN — ESCITALOPRAM OXALATE 10 MG: 10 TABLET ORAL at 18:19

## 2020-07-03 RX ADMIN — RIVAROXABAN 20 MG: 20 TABLET, FILM COATED ORAL at 18:19

## 2020-07-03 RX ADMIN — MIRTAZAPINE 15 MG: 15 TABLET, ORALLY DISINTEGRATING ORAL at 20:27

## 2020-07-03 RX ADMIN — ARIPIPRAZOLE 2 MG: 2 TABLET ORAL at 20:27

## 2020-07-03 RX ADMIN — DIVALPROEX SODIUM 250 MG: 250 TABLET, DELAYED RELEASE ORAL at 18:19

## 2020-07-03 RX ADMIN — LEVETIRACETAM 500 MG: 500 TABLET ORAL at 18:19

## 2020-07-03 RX ADMIN — DOCUSATE SODIUM 50 MG AND SENNOSIDES 8.6 MG 2 TABLET: 8.6; 5 TABLET, FILM COATED ORAL at 18:19

## 2020-07-03 ASSESSMENT — COGNITIVE AND FUNCTIONAL STATUS - GENERAL
DRESSING REGULAR UPPER BODY CLOTHING: A LITTLE
DRESSING REGULAR LOWER BODY CLOTHING: A LITTLE
CLIMB 3 TO 5 STEPS WITH RAILING: A LITTLE
STANDING UP FROM CHAIR USING ARMS: A LITTLE
MOBILITY SCORE: 20
DAILY ACTIVITIY SCORE: 20
SUGGESTED CMS G CODE MODIFIER MOBILITY: CJ
TOILETING: A LITTLE
MOVING TO AND FROM BED TO CHAIR: A LITTLE
HELP NEEDED FOR BATHING: A LITTLE
WALKING IN HOSPITAL ROOM: A LITTLE
SUGGESTED CMS G CODE MODIFIER DAILY ACTIVITY: CJ

## 2020-07-03 ASSESSMENT — CHA2DS2 SCORE
AGE 65 TO 74: YES
AGE 75 OR GREATER: NO
VASCULAR DISEASE: NO
DIABETES: NO
CHA2DS2 VASC SCORE: 4
HYPERTENSION: NO
PRIOR STROKE OR TIA OR THROMBOEMBOLISM: YES
SEX: FEMALE
CHF OR LEFT VENTRICULAR DYSFUNCTION: NO

## 2020-07-03 ASSESSMENT — ENCOUNTER SYMPTOMS
MYALGIAS: 0
BLURRED VISION: 0
CHILLS: 0
CLAUDICATION: 0
DOUBLE VISION: 0
COUGH: 0
NAUSEA: 0
FEVER: 0
BACK PAIN: 0
PALPITATIONS: 0
DIZZINESS: 0
HEMOPTYSIS: 0
VOMITING: 0
WHEEZING: 0
HEARTBURN: 0
HEADACHES: 0
BRUISES/BLEEDS EASILY: 0
PND: 0
DEPRESSION: 0

## 2020-07-03 ASSESSMENT — COPD QUESTIONNAIRES
COPD SCREENING SCORE: 2
HAVE YOU SMOKED AT LEAST 100 CIGARETTES IN YOUR ENTIRE LIFE: NO/DON'T KNOW
DO YOU EVER COUGH UP ANY MUCUS OR PHLEGM?: NO/ONLY WITH OCCASIONAL COLDS OR INFECTIONS
DURING THE PAST 4 WEEKS HOW MUCH DID YOU FEEL SHORT OF BREATH: NONE/LITTLE OF THE TIME

## 2020-07-03 ASSESSMENT — LIFESTYLE VARIABLES
ON A TYPICAL DAY WHEN YOU DRINK ALCOHOL HOW MANY DRINKS DO YOU HAVE: 0
EVER HAD A DRINK FIRST THING IN THE MORNING TO STEADY YOUR NERVES TO GET RID OF A HANGOVER: NO
AVERAGE NUMBER OF DAYS PER WEEK YOU HAVE A DRINK CONTAINING ALCOHOL: 0
EVER_SMOKED: UNABLE TO EVALUATE AT THIS TIME - NEEDS ASSESSMENT PRIOR TO DISCHARGE
ALCOHOL_USE: NO
HAVE YOU EVER FELT YOU SHOULD CUT DOWN ON YOUR DRINKING: NO
DOES PATIENT WANT TO STOP DRINKING: NO
TOTAL SCORE: 0
HAVE PEOPLE ANNOYED YOU BY CRITICIZING YOUR DRINKING: NO
CONSUMPTION TOTAL: NEGATIVE
EVER FELT BAD OR GUILTY ABOUT YOUR DRINKING: NO
TOTAL SCORE: 0
TOTAL SCORE: 0
HOW MANY TIMES IN THE PAST YEAR HAVE YOU HAD 5 OR MORE DRINKS IN A DAY: 0

## 2020-07-03 ASSESSMENT — FIBROSIS 4 INDEX
FIB4 SCORE: 2.91
FIB4 SCORE: 1.54
FIB4 SCORE: 2.91

## 2020-07-03 NOTE — DISCHARGE PLANNING
note:  JANINA attempted to call [facility] again and Kye from [facility] answered. Janina asked to talk to someone about pt's return to their facility. Kye said he will pass information to Malick () because he has to clear pt's return. They will call JANINA back.

## 2020-07-03 NOTE — ED NOTES
Pharmacy Medication Reconciliation      Medication reconciliation updated and complete per [facility]  Allergies have been verified and updated per MAR  No oral ABX within the last 14 days  Pt home pharmacy:[facility]

## 2020-07-03 NOTE — PROGRESS NOTES
Triage officer note /transfer note     Room green 27    D/W Dr Dr. Montoya     CC: dementia, does not want to eat, covid (+)     ED course: COVID (+), can not be sent back to care facility 2/2 no response from that facility     Need to do: placement. COVID (+), asymptomatic for COVID     Admit to Obs      Admitting hospitalist is Dr Vergara

## 2020-07-03 NOTE — PROGRESS NOTES
2 RN Skin Check    2 RN skin check complete.   Devices in place: N/A.  Skin assessed under devices: N\A.  Confirmed pressure ulcers found on: N/A.  New potential pressure ulcers noted on N/A. Wound consult placed N/A.  The following interventions in place Barrier cream.    Heels pink, intact, blanchable. Coccyx red and blanchable, skin intact.

## 2020-07-03 NOTE — ED NOTES
Pt transported S172-2 by wheelchair. Pt did get aggressive with tech when getting in the wheelchair. Easily redirected. VS stable upon leaving ER;'

## 2020-07-03 NOTE — ED TRIAGE NOTES
Azul Milan  Pt bib EMS. Pt changed into gown and placed on monitor.     Chief Complaint   Patient presents with   • Medical Clearance     Pt tested + covid.  But ems was called today bc she did not want to eat breakfast    Chart up and ready for ERP now.

## 2020-07-03 NOTE — DISCHARGE PLANNING
note:  Received copies of the POLST and POA from daughter Ursula # [phone].  Scanned in chart.     JANINA attempted to call [facility] to check in with Malick ( ) but there was no answer.

## 2020-07-03 NOTE — CARE PLAN
Problem: Communication  Goal: The ability to communicate needs accurately and effectively will improve  Outcome: PROGRESSING AS EXPECTED  Intervention: Reorient patient to environment as needed  Flowsheets (Taken 7/3/2020 1600)  Oriented to:: All of the Following : Location of Bathroom, Visiting Policy, Unit Routine, Call Light and Bedside Controls, Bedside Rail Policy, Smoking Policy, Rights and Responsibilities, Bedside Report, and Patient Education Notebook     Problem: Safety  Goal: Will remain free from falls  Outcome: PROGRESSING AS EXPECTED  Intervention: Implement fall precautions  Flowsheets (Taken 7/3/2020 1600)  Bed Alarm: Yes - Alarm On  Environmental Precautions:   Personal Belongings, Wastebasket, Call Bell etc. in Easy Reach   Treaded Slipper Socks on Patient   Transferred to Stronger Side   Report Given to Other Health Care Providers Regarding Fall Risk   Bed in Low Position   Mobility Assessed & Appropriate Sign Placed   Communication Sign for Patients & Families  Bedrails: Alternative to Bedrails Used  Chair/Bed Strip Alarm: Yes - Alarm On

## 2020-07-03 NOTE — H&P
Hospital Medicine History & Physical Note    Date of Service  7/3/2020    Primary Care Physician  Rachelle Isaac M.D.    Code Status  DNAR/DNI    Chief Complaint  Chief Complaint   Patient presents with   • Medical Clearance       History of Presenting Illness  69 y.o. female who presented 7/3/2020 with pmh of dementia apparently was sent to hospital due to decreased appetite, patient is poor historian and she denies any symptoms, no sob, no chest pain no palpitation, no headache, no N/V/D/C, no abdominal pain no urinary symptoms, no fever or chills, initial work up in er showed mild leukopenia, vs stable, patient is in not distress nor in pain.   Will repeat covid test and place patient in isolation.   Continue monitoring.     Review of Systems  Review of Systems   Reason unable to perform ROS: limited due to dementia.    Constitutional: Negative for chills and fever.   Eyes: Negative for blurred vision and double vision.   Respiratory: Negative for cough, hemoptysis and wheezing.    Cardiovascular: Negative for chest pain, palpitations, claudication, leg swelling and PND.   Gastrointestinal: Negative for heartburn, nausea and vomiting.   Genitourinary: Negative for hematuria and urgency.   Musculoskeletal: Negative for back pain and myalgias.   Skin: Negative for rash.   Neurological: Negative for dizziness and headaches.   Endo/Heme/Allergies: Does not bruise/bleed easily.   Psychiatric/Behavioral: Negative for depression.       Past Medical History   has a past medical history of A-fib (HCC), Hypokalemia, Hypotension, and Syncope.    Surgical History   has a past surgical history that includes tonsillectomy; tubal coagulation laparoscopic bilateral; colonoscopy; and endoscopy.     Family History  family history includes Cancer in her sister; Dementia in her mother; Lung Disease in her paternal grandfather; Other in her brother.     Social History   reports that she has never smoked. She has never used smokeless  tobacco. She reports that she does not drink alcohol or use drugs.    Allergies  Allergies   Allergen Reactions   • Other Food Unspecified     Corn   • Penicillins Rash     Minor RASH  Received as a young child       Medications  Prior to Admission Medications   Prescriptions Last Dose Informant Patient Reported? Taking?   ARIPiprazole (ABILIFY) 2 MG tablet 7/2/2020 at 1900 MAR from Other Facility Yes Yes   Sig: Take 2 mg by mouth every bedtime.   divalproex (DEPAKOTE) 250 MG Tablet Delayed Response 7/3/2020 at 0900 MAR from Other Facility Yes No   Sig: Take 1 Tab by mouth 2 Times a Day. TAKE 1 TABLET BY MOUTH TWICE A DAY   escitalopram (LEXAPRO) 10 MG Tab 7/2/2020 at 1700 MAR from Other Facility Yes Yes   Sig: Take 10 mg by mouth every evening.   levetiracetam (KEPPRA) 500 MG TABLET SR 24 HR 7/2/2020 at 1700 MAR from Other Facility Yes Yes   Sig: Take 1,000 mg by mouth every evening.   mirtazapine (REMERON) 15 MG TABLET DISPERSIBLE 7/2/2020 at 1900 MAR from Other Facility Yes Yes   Sig: Take 15 mg by mouth every evening.   rivaroxaban (XARELTO) 20 MG Tab tablet 7/2/2020 at 1700 MAR from Other Facility Yes Yes   Sig: Take 20 mg by mouth every day.      Facility-Administered Medications: None       Physical Exam  Temp:  [36.4 °C (97.5 °F)] 36.4 °C (97.5 °F)  Pulse:  [64] 64  Resp:  [20] 20  BP: (116)/(66) 116/66  SpO2:  [93 %] 93 %    Physical Exam  Vitals signs and nursing note reviewed.   Constitutional:       General: She is not in acute distress.     Appearance: Normal appearance. She is not ill-appearing.   HENT:      Head: Normocephalic and atraumatic.      Nose: Nose normal.      Mouth/Throat:      Mouth: Mucous membranes are moist.      Pharynx: Oropharynx is clear.   Eyes:      General:         Right eye: No discharge.         Left eye: No discharge.      Conjunctiva/sclera: Conjunctivae normal.      Pupils: Pupils are equal, round, and reactive to light.   Neck:      Musculoskeletal: Normal range of motion  and neck supple. No neck rigidity or muscular tenderness.   Cardiovascular:      Rate and Rhythm: Normal rate.      Pulses: Normal pulses.   Pulmonary:      Effort: Pulmonary effort is normal. No respiratory distress.      Breath sounds: Normal breath sounds. No wheezing.   Abdominal:      General: Bowel sounds are normal. There is no distension.      Palpations: Abdomen is soft.      Tenderness: There is no abdominal tenderness.   Musculoskeletal: Normal range of motion.   Skin:     Capillary Refill: Capillary refill takes less than 2 seconds.   Neurological:      Mental Status: She is alert. Mental status is at baseline. She is disoriented.   Psychiatric:         Mood and Affect: Mood normal.         Laboratory:  Recent Labs     07/03/20  1145   WBC 3.4*   RBC 4.51   HEMOGLOBIN 14.1   HEMATOCRIT 43.2   MCV 95.8   MCH 31.3   MCHC 32.6*   RDW 43.1   PLATELETCT 173   MPV 10.3     Recent Labs     07/03/20  1145   SODIUM 137   POTASSIUM 3.8   CHLORIDE 100   CO2 25   GLUCOSE 89   BUN 15   CREATININE 0.57   CALCIUM 8.4*     Recent Labs     07/03/20  1145   ALTSGPT 28   ASTSGOT 38   ALKPHOSPHAT 101*   TBILIRUBIN 0.3   GLUCOSE 89         No results for input(s): NTPROBNP in the last 72 hours.      No results for input(s): TROPONINT in the last 72 hours.    Imaging:  DX-CHEST-PORTABLE (1 VIEW)   Final Result      1.  There is no acute cardiopulmonary process.            Assessment/Plan:  Observation.     Early onset Alzheimer's dementia without behavioral disturbance (HCC)- (present on admission)  Assessment & Plan  Stable, cooperative    Leukopenia- (present on admission)  Assessment & Plan  Mild, continue monitoring.     Suspected COVID-19 virus infection- (present on admission)  Assessment & Plan  Apparently was tested at facility, will check SARS COV2 will place in isolation per protocol.    Paroxysmal atrial fibrillation (HCC)- (present on admission)  Assessment & Plan  Continue on xarelto.   Rate controlled.       DVT  prophylaxis on xarelto.

## 2020-07-03 NOTE — ED PROVIDER NOTES
ED Provider Note    CHIEF COMPLAINT  Chief Complaint   Patient presents with   • Medical Clearance       HPI  Azul Milan is a 69 y.o. female who presents for evaluation from her long-term care facility.  The patient has a history of dementia and is a DNR.  She reportedly has been tested positive for COVID but is asymptomatic.  She evidently did not eat her breakfast this morning and therefore she is sent in by ambulance for evaluation.  The patient has no physical complaints whatsoever.  She denies any pain.  She denies any cough or shortness of breath.  She has had no vomiting or diarrhea.  She denies any abdominal pain.  She denies any urinary symptoms.  She states she feels absolutely fine.    REVIEW OF SYSTEMS  See HPI for further details. All other systems negative.    PAST MEDICAL HISTORY  Past Medical History:   Diagnosis Date   • A-fib (HCC)    • Hypokalemia    • Hypotension    • Syncope        FAMILY HISTORY  Family History   Problem Relation Age of Onset   • Dementia Mother    • Cancer Sister         breast   • Other Brother         wegeners grandulomatosis   • Lung Disease Paternal Grandfather         TB. Lung removal       SOCIAL HISTORY  Social History     Socioeconomic History   • Marital status:      Spouse name: Not on file   • Number of children: Not on file   • Years of education: Not on file   • Highest education level: Not on file   Occupational History   • Not on file   Social Needs   • Financial resource strain: Not on file   • Food insecurity     Worry: Not on file     Inability: Not on file   • Transportation needs     Medical: Not on file     Non-medical: Not on file   Tobacco Use   • Smoking status: Never Smoker   • Smokeless tobacco: Never Used   Substance and Sexual Activity   • Alcohol use: No     Comment: rarely   • Drug use: No   • Sexual activity: Yes     Partners: Male   Lifestyle   • Physical activity     Days per week: Not on file     Minutes per session:  "Not on file   • Stress: Not on file   Relationships   • Social connections     Talks on phone: Not on file     Gets together: Not on file     Attends [employer] service: Not on file     Active member of club or organization: Not on file     Attends meetings of clubs or organizations: Not on file     Relationship status: Not on file   • Intimate partner violence     Fear of current or ex partner: Not on file     Emotionally abused: Not on file     Physically abused: Not on file     Forced sexual activity: Not on file   Other Topics Concern   • Not on file   Social History Narrative   • Not on file       SURGICAL HISTORY  Past Surgical History:   Procedure Laterality Date   • COLONOSCOPY     • ENDOSCOPY     • TONSILLECTOMY     • TUBAL COAGULATION LAPAROSCOPIC BILATERAL         CURRENT MEDICATIONS  Home Medications    **Home medications have not yet been reviewed for this encounter**         ALLERGIES  Allergies   Allergen Reactions   • Other Food Unspecified     Corn   • Penicillins Rash     Minor RASH  Received as a young child       PHYSICAL EXAM  VITAL SIGNS: /66   Pulse 64   Temp 36.4 °C (97.5 °F)   Resp 20   Ht 1.626 m (5' 4\")   Wt 56.2 kg (124 lb)   SpO2 93%   BMI 21.28 kg/m²   Constitutional: Well developed, Well nourished, No acute distress, Non-toxic appearance.   HENT: Normocephalic, Atraumatic.  Eyes:  EOMI, Conjunctiva normal, No discharge.   Cardiovascular: Normal heart rate.   Thorax & Lungs: No respiratory distress. No chest tenderness.   Abdomen: Soft and nontender.  Skin: Warm, Dry.  Extremities: No edema, no calf tenderness.  Musculoskeletal: Good range of motion in all major joints.   Neurologic: Awake and alert, Normal motor function, Normal sensory function, No focal deficits noted.       RADIOLOGY/PROCEDURES  DX-CHEST-PORTABLE (1 VIEW)   Final Result      1.  There is no acute cardiopulmonary process.            COURSE & MEDICAL DECISION MAKING  Pertinent Labs & Imaging studies " reviewed. (See chart for details)  This is a 69-year-old with a history of dementia who was sent in from her long-term care facility for evaluation.  She evidently chest positive for COVID-19.  She is asymptomatic.  She did not eat her breakfast this morning therefore paramedics were called for her to be sent to the ER for evaluation.  The patient has absolutely no complaints at this time.  She is afebrile.  Her pulse and blood pressure are normal.  She has no respiratory distress.  She is not hypoxemic.  I have discussed the case with our .  They have attempted on multiple occasions to contact her long-term care facility and they have failed to answer the phone.  For that reason the patient will require hospitalization.  Her laboratory work-up includes a CBC with a white count being low at 3.4 with a differential of 44 polys and 45 lymphocytes.  Her chemistries are essentially normal.  Urine is pending.  Chest x-ray shows no acute cardiopulmonary process.  I discussed the case with the hospitalist and they will be the primary admitting physicians.  I have also spoken with Yessi who is the patient's daughter and has DURABLE POWER OF  for healthcare.  She states the patient is a DNR/DNI.  She would like to be contacted if there are any medical emergencies.  The patient has remained stable and asymptomatic throughout her stay in the emergency department.    FINAL IMPRESSION  1.  COVID positive asymptomatic patient  2.  Dementia  3.         Electronically signed by: Cesar Montoya M.D., 7/3/2020 11:14 AM

## 2020-07-03 NOTE — PROGRESS NOTES
Patient arrived to room by wheelchair from ED. Patient ambulated from wheelchair to bed. No belongings present except patient's own nightgown and slippers. No c/o pain. Patient on room air, oriented to self.

## 2020-07-03 NOTE — DISCHARGE PLANNING
Per Dr. Montoya, pt is clear to return back to [facility].  CM attempted to call # [phone] but there was no answer. Phone rang several times but there was no answer. Unable to leave any messages.

## 2020-07-04 LAB
ALBUMIN SERPL BCP-MCNC: 3.3 G/DL (ref 3.2–4.9)
ALBUMIN/GLOB SERPL: 1.1 G/DL
ALP SERPL-CCNC: 94 U/L (ref 30–99)
ALT SERPL-CCNC: 24 U/L (ref 2–50)
ANION GAP SERPL CALC-SCNC: 9 MMOL/L (ref 7–16)
AST SERPL-CCNC: 32 U/L (ref 12–45)
BILIRUB SERPL-MCNC: 0.3 MG/DL (ref 0.1–1.5)
BUN SERPL-MCNC: 14 MG/DL (ref 8–22)
CALCIUM SERPL-MCNC: 8.1 MG/DL (ref 8.5–10.5)
CHLORIDE SERPL-SCNC: 101 MMOL/L (ref 96–112)
CO2 SERPL-SCNC: 28 MMOL/L (ref 20–33)
CREAT SERPL-MCNC: 0.56 MG/DL (ref 0.5–1.4)
ERYTHROCYTE [DISTWIDTH] IN BLOOD BY AUTOMATED COUNT: 42.5 FL (ref 35.9–50)
GLOBULIN SER CALC-MCNC: 3 G/DL (ref 1.9–3.5)
GLUCOSE SERPL-MCNC: 86 MG/DL (ref 65–99)
HCT VFR BLD AUTO: 40.8 % (ref 37–47)
HGB BLD-MCNC: 13.4 G/DL (ref 12–16)
MCH RBC QN AUTO: 30.9 PG (ref 27–33)
MCHC RBC AUTO-ENTMCNC: 32.8 G/DL (ref 33.6–35)
MCV RBC AUTO: 94.2 FL (ref 81.4–97.8)
PLATELET # BLD AUTO: 187 K/UL (ref 164–446)
PMV BLD AUTO: 10 FL (ref 9–12.9)
POTASSIUM SERPL-SCNC: 3.6 MMOL/L (ref 3.6–5.5)
PROT SERPL-MCNC: 6.3 G/DL (ref 6–8.2)
RBC # BLD AUTO: 4.33 M/UL (ref 4.2–5.4)
SODIUM SERPL-SCNC: 138 MMOL/L (ref 135–145)
WBC # BLD AUTO: 3.2 K/UL (ref 4.8–10.8)

## 2020-07-04 PROCEDURE — A9270 NON-COVERED ITEM OR SERVICE: HCPCS | Performed by: HOSPITALIST

## 2020-07-04 PROCEDURE — 85027 COMPLETE CBC AUTOMATED: CPT

## 2020-07-04 PROCEDURE — 36415 COLL VENOUS BLD VENIPUNCTURE: CPT

## 2020-07-04 PROCEDURE — G0378 HOSPITAL OBSERVATION PER HR: HCPCS

## 2020-07-04 PROCEDURE — 99225 PR SUBSEQUENT OBSERVATION CARE,LEVEL II: CPT | Performed by: HOSPITALIST

## 2020-07-04 PROCEDURE — 700102 HCHG RX REV CODE 250 W/ 637 OVERRIDE(OP): Performed by: HOSPITALIST

## 2020-07-04 PROCEDURE — 80053 COMPREHEN METABOLIC PANEL: CPT

## 2020-07-04 RX ORDER — ADENOSINE 3 MG/ML
INJECTION, SOLUTION INTRAVENOUS
Status: DISCONTINUED
Start: 2020-07-04 | End: 2020-07-04

## 2020-07-04 RX ADMIN — DIVALPROEX SODIUM 250 MG: 250 TABLET, DELAYED RELEASE ORAL at 04:48

## 2020-07-04 RX ADMIN — RIVAROXABAN 20 MG: 20 TABLET, FILM COATED ORAL at 04:48

## 2020-07-04 RX ADMIN — LEVETIRACETAM 500 MG: 500 TABLET ORAL at 04:46

## 2020-07-04 RX ADMIN — ARIPIPRAZOLE 2 MG: 2 TABLET ORAL at 20:43

## 2020-07-04 RX ADMIN — DIVALPROEX SODIUM 250 MG: 250 TABLET, DELAYED RELEASE ORAL at 17:50

## 2020-07-04 RX ADMIN — ESCITALOPRAM OXALATE 10 MG: 10 TABLET ORAL at 17:50

## 2020-07-04 RX ADMIN — LEVETIRACETAM 500 MG: 500 TABLET ORAL at 17:50

## 2020-07-04 RX ADMIN — MIRTAZAPINE 15 MG: 15 TABLET, ORALLY DISINTEGRATING ORAL at 20:43

## 2020-07-04 ASSESSMENT — FIBROSIS 4 INDEX: FIB4 SCORE: 2.45

## 2020-07-04 NOTE — PROGRESS NOTES
[facility] Medicine Daily Progress Note    Date of Service  7/4/2020    Chief Complaint  69 y.o. female admitted 7/3/2020 with covid 19.     Hospital Course    69 y.o. female who presented 7/3/2020 with pmh of dementia apparently was sent to hospital due to decreased appetite, patient is poor historian and she denies any symptoms, no sob, no chest pain no palpitation, no headache, no N/V/D/C, no abdominal pain no urinary symptoms, no fever or chills, initial work up in er showed mild leukopenia, vs stable, patient is in not distress nor in pain.       Interval Problem Update  Negative covid 19 testing x 1, will need repeat prior to sending back to SNF   SNF not answering call back.     Consultants/Specialty  none    Code Status  dnr/dni    Disposition  SNF    Review of Systems  Review of Systems   Unable to perform ROS: Dementia        Physical Exam  Temp:  [36.4 °C (97.6 °F)-36.9 °C (98.5 °F)] 36.4 °C (97.6 °F)  Pulse:  [64-75] 75  Resp:  [16-18] 16  BP: (102-131)/(62-73) 126/66  SpO2:  [92 %-95 %] 93 %    Physical Exam  Vitals signs reviewed.   Constitutional:       Appearance: Normal appearance.   HENT:      Head: Normocephalic and atraumatic.      Nose: No congestion.      Mouth/Throat:      Mouth: Mucous membranes are dry.   Eyes:      Extraocular Movements: Extraocular movements intact.      Pupils: Pupils are equal, round, and reactive to light.   Neck:      Musculoskeletal: Normal range of motion and neck supple. No muscular tenderness.   Cardiovascular:      Rate and Rhythm: Normal rate and regular rhythm.      Pulses: Normal pulses.      Heart sounds: Normal heart sounds. No murmur.   Pulmonary:      Effort: Pulmonary effort is normal. No respiratory distress.      Breath sounds: Normal breath sounds. No stridor.   Abdominal:      General: Bowel sounds are normal. There is no distension.      Palpations: Abdomen is soft.      Tenderness: There is no abdominal tenderness.   Musculoskeletal:         General:  No swelling or deformity.   Skin:     General: Skin is warm and dry.      Capillary Refill: Capillary refill takes 2 to 3 seconds.   Neurological:      General: No focal deficit present.      Mental Status: She is alert.   Psychiatric:      Comments: Mildly confused         Fluids    Intake/Output Summary (Last 24 hours) at 7/4/2020 1127  Last data filed at 7/3/2020 2000  Gross per 24 hour   Intake 150 ml   Output --   Net 150 ml       Laboratory  Recent Labs     07/03/20  1145 07/04/20  0330   WBC 3.4* 3.2*   RBC 4.51 4.33   HEMOGLOBIN 14.1 13.4   HEMATOCRIT 43.2 40.8   MCV 95.8 94.2   MCH 31.3 30.9   MCHC 32.6* 32.8*   RDW 43.1 42.5   PLATELETCT 173 187   MPV 10.3 10.0     Recent Labs     07/03/20  1145 07/04/20  0330   SODIUM 137 138   POTASSIUM 3.8 3.6   CHLORIDE 100 101   CO2 25 28   GLUCOSE 89 86   BUN 15 14   CREATININE 0.57 0.56   CALCIUM 8.4* 8.1*                   Imaging  DX-CHEST-PORTABLE (1 VIEW)   Final Result      1.  There is no acute cardiopulmonary process.           Assessment/Plan  * Suspected COVID-19 virus infection- (present on admission)  Assessment & Plan  Apparently was tested at facility, will check SARS COV2 will place in isolation per protocol.  Repeat testing at [facility] x 1 is negative   Repeat testing ordered for 7/5/20  Will need to send back to SNF once negative x 2    Early onset Alzheimer's dementia without behavioral disturbance (HCC)- (present on admission)  Assessment & Plan  Stable, cooperative    Leukopenia- (present on admission)  Assessment & Plan  Mild, continue monitoring.     Paroxysmal atrial fibrillation (HCC)- (present on admission)  Assessment & Plan  Continue on xarelto.   Rate controlled.        VTE prophylaxis: xarelto

## 2020-07-04 NOTE — CARE PLAN
Problem: Safety  Goal: Will remain free from falls  Outcome: PROGRESSING AS EXPECTED  Intervention: Implement fall precautions  Flowsheets  Taken 7/3/2020 1600 by Annette France RYOSELIN  Bed Alarm: Yes - Alarm On  Bedrails: Alternative to Bedrails Used  Chair/Bed Strip Alarm: Yes - Alarm On  Taken 7/3/2020 2000 by Marta R. Weil, RTyraNTyra  Environmental Precautions:   Treaded Slipper Socks on Patient   Bed in Low Position     Problem: Psychosocial Needs:  Goal: Level of anxiety will decrease  Outcome: PROGRESSING AS EXPECTED

## 2020-07-05 LAB
COVID ORDER STATUS COVID19: NORMAL
SARS-COV-2 RDRP RESP QL NAA+PROBE: NOTDETECTED
SPECIMEN SOURCE: NORMAL

## 2020-07-05 PROCEDURE — C9803 HOPD COVID-19 SPEC COLLECT: HCPCS | Performed by: HOSPITALIST

## 2020-07-05 PROCEDURE — 700102 HCHG RX REV CODE 250 W/ 637 OVERRIDE(OP): Performed by: HOSPITALIST

## 2020-07-05 PROCEDURE — G0378 HOSPITAL OBSERVATION PER HR: HCPCS

## 2020-07-05 PROCEDURE — A9270 NON-COVERED ITEM OR SERVICE: HCPCS | Performed by: HOSPITALIST

## 2020-07-05 PROCEDURE — 99225 PR SUBSEQUENT OBSERVATION CARE,LEVEL II: CPT | Performed by: HOSPITALIST

## 2020-07-05 PROCEDURE — U0004 COV-19 TEST NON-CDC HGH THRU: HCPCS

## 2020-07-05 RX ADMIN — LEVETIRACETAM 500 MG: 500 TABLET ORAL at 04:34

## 2020-07-05 RX ADMIN — ARIPIPRAZOLE 2 MG: 2 TABLET ORAL at 20:09

## 2020-07-05 RX ADMIN — RIVAROXABAN 20 MG: 20 TABLET, FILM COATED ORAL at 04:34

## 2020-07-05 RX ADMIN — DIVALPROEX SODIUM 250 MG: 250 TABLET, DELAYED RELEASE ORAL at 04:34

## 2020-07-05 RX ADMIN — MIRTAZAPINE 15 MG: 15 TABLET, ORALLY DISINTEGRATING ORAL at 20:09

## 2020-07-05 RX ADMIN — DIVALPROEX SODIUM 250 MG: 250 TABLET, DELAYED RELEASE ORAL at 17:00

## 2020-07-05 RX ADMIN — LEVETIRACETAM 500 MG: 500 TABLET ORAL at 17:00

## 2020-07-05 RX ADMIN — ESCITALOPRAM OXALATE 10 MG: 10 TABLET ORAL at 17:00

## 2020-07-05 NOTE — PROGRESS NOTES
[facility] Medicine Daily Progress Note    Date of Service  7/5/2020    Chief Complaint  69 y.o. female admitted 7/3/2020 with covid 19.     Hospital Course    69 y.o. female who presented 7/3/2020 with pmh of dementia apparently was sent to hospital due to decreased appetite, patient is poor historian and she denies any symptoms, no sob, no chest pain no palpitation, no headache, no N/V/D/C, no abdominal pain no urinary symptoms, no fever or chills, initial work up in er showed mild leukopenia, vs stable, patient is in not distress nor in pain.       Interval Problem Update  Negative covid 19 testing x 1, will need repeat prior to sending back to SNF to be done today  SNF not answering call back.     Consultants/Specialty  none    Code Status  dnr/dni    Disposition  SNF, transfer order placed to medical     Review of Systems  Review of Systems   Unable to perform ROS: Dementia        Physical Exam  Temp:  [36.3 °C (97.4 °F)-37.3 °C (99.2 °F)] 36.3 °C (97.4 °F)  Pulse:  [60-75] 60  Resp:  [16-19] 19  BP: (110-120)/(70-71) 119/71  SpO2:  [94 %-96 %] 94 %    Physical Exam  Vitals signs reviewed.   Constitutional:       Appearance: Normal appearance.   HENT:      Head: Normocephalic and atraumatic.      Nose: No congestion.      Mouth/Throat:      Mouth: Mucous membranes are dry.   Eyes:      Extraocular Movements: Extraocular movements intact.      Pupils: Pupils are equal, round, and reactive to light.   Neck:      Musculoskeletal: Normal range of motion and neck supple. No muscular tenderness.   Cardiovascular:      Rate and Rhythm: Normal rate and regular rhythm.      Pulses: Normal pulses.      Heart sounds: Normal heart sounds. No murmur.   Pulmonary:      Effort: Pulmonary effort is normal. No respiratory distress.      Breath sounds: Normal breath sounds. No stridor.   Abdominal:      General: Bowel sounds are normal. There is no distension.      Palpations: Abdomen is soft.      Tenderness: There is no  abdominal tenderness.   Musculoskeletal:         General: No swelling or deformity.   Skin:     General: Skin is warm and dry.      Capillary Refill: Capillary refill takes 2 to 3 seconds.   Neurological:      General: No focal deficit present.      Mental Status: She is alert.   Psychiatric:      Comments: Mildly confused         Fluids    Intake/Output Summary (Last 24 hours) at 7/5/2020 0955  Last data filed at 7/4/2020 1800  Gross per 24 hour   Intake 400 ml   Output 200 ml   Net 200 ml       Laboratory  Recent Labs     07/03/20  1145 07/04/20  0330   WBC 3.4* 3.2*   RBC 4.51 4.33   HEMOGLOBIN 14.1 13.4   HEMATOCRIT 43.2 40.8   MCV 95.8 94.2   MCH 31.3 30.9   MCHC 32.6* 32.8*   RDW 43.1 42.5   PLATELETCT 173 187   MPV 10.3 10.0     Recent Labs     07/03/20  1145 07/04/20  0330   SODIUM 137 138   POTASSIUM 3.8 3.6   CHLORIDE 100 101   CO2 25 28   GLUCOSE 89 86   BUN 15 14   CREATININE 0.57 0.56   CALCIUM 8.4* 8.1*                   Imaging  DX-CHEST-PORTABLE (1 VIEW)   Final Result      1.  There is no acute cardiopulmonary process.           Assessment/Plan  * Suspected COVID-19 virus infection- (present on admission)  Assessment & Plan  Apparently was tested at facility, will check SARS COV2 will place in isolation per protocol.  Repeat testing at [facility] x 1 is negative   Repeat testing ordered for 7/5/20, pending  Will need to send back to SNF once negative x 2    Early onset Alzheimer's dementia without behavioral disturbance (HCC)- (present on admission)  Assessment & Plan  Stable, cooperative    Leukopenia- (present on admission)  Assessment & Plan  Mild, continue monitoring.     Paroxysmal atrial fibrillation (HCC)- (present on admission)  Assessment & Plan  Continue on xarelto.   Rate controlled.        VTE prophylaxis: xarelto

## 2020-07-05 NOTE — CARE PLAN
Problem: Safety  Goal: Will remain free from falls  Outcome: PROGRESSING AS EXPECTED  Intervention: Assess risk factors for falls  Flowsheets  Taken 7/4/2020 0800 by Annette France RYOSELIN  Pt Calls for Assistance: No  Taken 7/4/2020 2131 by Marco Antonio Balderas R.N.  History of fall: 0  Mobility Status Assessment: 1-1 Healthcare Provider Required for Assistance with Ambulation & Transfer  Risk for Injury-Any positive answers results in the pt being at high risk for fall related injury: Not Applicable  Note: Pt's call light within reach, pt calls for assistance, bed locked and in lowest position, frequent rounded, bed alarm in place, personal items within reach      Problem: Infection  Goal: Will remain free from infection  Outcome: PROGRESSING AS EXPECTED  Intervention: Implement standard precautions and perform hand washing before and after patient contact  Note: Handwashing performed, pt educate on importance of handwashing

## 2020-07-05 NOTE — CARE PLAN
Problem: Safety  Goal: Will remain free from injury  Outcome: PROGRESSING AS EXPECTED     Problem: Knowledge Deficit  Goal: Knowledge of disease process/condition, treatment plan, diagnostic tests, and medications will improve  Outcome: PROGRESSING AS EXPECTED      Patient family has called this morning and have been updated on plan of care. Patient has call light in reach and all obstacles are out of patient way.

## 2020-07-06 ENCOUNTER — PATIENT OUTREACH (OUTPATIENT)
Dept: HEALTH INFORMATION MANAGEMENT | Facility: OTHER | Age: 70
End: 2020-07-06

## 2020-07-06 PROCEDURE — A9270 NON-COVERED ITEM OR SERVICE: HCPCS | Performed by: HOSPITALIST

## 2020-07-06 PROCEDURE — 99224 PR SUBSEQUENT OBSERVATION CARE,LEVEL I: CPT | Performed by: HOSPITALIST

## 2020-07-06 PROCEDURE — 700102 HCHG RX REV CODE 250 W/ 637 OVERRIDE(OP): Performed by: HOSPITALIST

## 2020-07-06 PROCEDURE — G0378 HOSPITAL OBSERVATION PER HR: HCPCS

## 2020-07-06 RX ADMIN — RIVAROXABAN 20 MG: 20 TABLET, FILM COATED ORAL at 04:41

## 2020-07-06 RX ADMIN — DIVALPROEX SODIUM 250 MG: 250 TABLET, DELAYED RELEASE ORAL at 16:49

## 2020-07-06 RX ADMIN — MIRTAZAPINE 15 MG: 15 TABLET, ORALLY DISINTEGRATING ORAL at 22:04

## 2020-07-06 RX ADMIN — DIVALPROEX SODIUM 250 MG: 250 TABLET, DELAYED RELEASE ORAL at 04:41

## 2020-07-06 RX ADMIN — ESCITALOPRAM OXALATE 10 MG: 10 TABLET ORAL at 16:49

## 2020-07-06 RX ADMIN — ARIPIPRAZOLE 2 MG: 2 TABLET ORAL at 22:04

## 2020-07-06 RX ADMIN — LEVETIRACETAM 500 MG: 500 TABLET ORAL at 16:49

## 2020-07-06 RX ADMIN — LEVETIRACETAM 500 MG: 500 TABLET ORAL at 04:41

## 2020-07-06 NOTE — PROGRESS NOTES
[facility] Medicine Daily Progress Note    Date of Service  7/6/2020    Chief Complaint  69 y.o. female admitted 7/3/2020 with covid 19.     Hospital Course    69 y.o. female who presented 7/3/2020 with pmh of dementia apparently was sent to hospital due to decreased appetite, patient is poor historian and she denies any symptoms, no sob, no chest pain no palpitation, no headache, no N/V/D/C, no abdominal pain no urinary symptoms, no fever or chills, initial work up in er showed mild leukopenia, vs stable, patient is in not distress nor in pain.       Interval Problem Update  Negative covid 19 testing x 2  SNF not answering call back.     Consultants/Specialty  none    Code Status  dnr/dni    Disposition  SNF, transfer order placed to medical     Review of Systems  Review of Systems   Unable to perform ROS: Dementia        Physical Exam  Temp:  [36 °C (96.8 °F)-36.7 °C (98 °F)] 36 °C (96.8 °F)  Pulse:  [60-67] 63  Resp:  [17-19] 18  BP: (100-134)/(56-69) 116/69  SpO2:  [92 %-95 %] 92 %    Physical Exam  Vitals signs reviewed.   Constitutional:       Appearance: Normal appearance.   HENT:      Head: Normocephalic and atraumatic.      Nose: No congestion.      Mouth/Throat:      Mouth: Mucous membranes are dry.   Eyes:      Extraocular Movements: Extraocular movements intact.      Pupils: Pupils are equal, round, and reactive to light.   Neck:      Musculoskeletal: Normal range of motion and neck supple. No muscular tenderness.   Cardiovascular:      Rate and Rhythm: Normal rate and regular rhythm.      Pulses: Normal pulses.      Heart sounds: Normal heart sounds. No murmur.   Pulmonary:      Effort: Pulmonary effort is normal. No respiratory distress.      Breath sounds: Normal breath sounds. No stridor.   Abdominal:      General: Bowel sounds are normal. There is no distension.      Palpations: Abdomen is soft.      Tenderness: There is no abdominal tenderness.   Musculoskeletal:         General: No swelling or  deformity.   Skin:     General: Skin is warm and dry.      Capillary Refill: Capillary refill takes 2 to 3 seconds.   Neurological:      General: No focal deficit present.      Mental Status: She is alert.   Psychiatric:      Comments: Mildly confused         Fluids    Intake/Output Summary (Last 24 hours) at 7/6/2020 1324  Last data filed at 7/5/2020 2015  Gross per 24 hour   Intake 150 ml   Output --   Net 150 ml       Laboratory  Recent Labs     07/04/20  0330   WBC 3.2*   RBC 4.33   HEMOGLOBIN 13.4   HEMATOCRIT 40.8   MCV 94.2   MCH 30.9   MCHC 32.8*   RDW 42.5   PLATELETCT 187   MPV 10.0     Recent Labs     07/04/20  0330   SODIUM 138   POTASSIUM 3.6   CHLORIDE 101   CO2 28   GLUCOSE 86   BUN 14   CREATININE 0.56   CALCIUM 8.1*                   Imaging  DX-CHEST-PORTABLE (1 VIEW)   Final Result      1.  There is no acute cardiopulmonary process.           Assessment/Plan  * Suspected COVID-19 virus infection- (present on admission)  Assessment & Plan  Apparently was tested at facility, will check SARS COV2 will place in isolation per protocol.    Negative covid testing x 2 at [facility], pending SNF placement now.     Early onset Alzheimer's dementia without behavioral disturbance (HCC)- (present on admission)  Assessment & Plan  Stable, cooperative    Leukopenia- (present on admission)  Assessment & Plan  Mild, continue monitoring.     Paroxysmal atrial fibrillation (HCC)- (present on admission)  Assessment & Plan  Continue on xarelto.   Rate controlled.        VTE prophylaxis: xarelto

## 2020-07-06 NOTE — DISCHARGE PLANNING
Hospital Care Management Discharge Planning       Anticipated Discharge Disposition:   · [facility]     Action:   · Chart reviewed.   · CM call to [facility] to f/u on pt take back. There continues to be no answer and unable to LVM requesting CM.  · CM will continue to attempt contacting facility for take back.     Barriers to Discharge:   · Unable to contact staff at facility despite multiple attempts.     Plan:   · Continue to attempt contact with facility.   · Continue to provide support services and assistance with discharge planning as needed.     7990--CM received phone call from pt's dtr Ursula who verbalizes understanding of discharge barriers that include CM inability to contact staff to discuss take back to [facility]. Ursula states she to has attempted to call but there has been no answer so far. Ursula states she is going to [facility] to speak to  Malick to see what barriers are occurring and will call this writer with update.

## 2020-07-06 NOTE — PROGRESS NOTES
Pt laying in bed, call light within reach, bed lowered and locked, fall education reinforced. Pt is A&Ox1 and only oriented to self and on room air. Pt lung sounds are diminished in all lobes, bowel sounds are normoactive in all four quadrants, heart sounds are within defined limits. PT iv is clean,dry,intact, and patent and saline locked. Pt is stand-by to ambulate. Pt is a moderate fall risk with bed alarm in place. Pt is incontinent of bowel and urine with purwick in place. Hourly rounding on patient in place. Q2H turns in place.

## 2020-07-07 PROBLEM — D72.819 LEUKOPENIA: Status: RESOLVED | Noted: 2020-07-03 | Resolved: 2020-07-07

## 2020-07-07 PROCEDURE — 99224 PR SUBSEQUENT OBSERVATION CARE,LEVEL I: CPT | Performed by: HOSPITALIST

## 2020-07-07 PROCEDURE — G0378 HOSPITAL OBSERVATION PER HR: HCPCS

## 2020-07-07 PROCEDURE — A9270 NON-COVERED ITEM OR SERVICE: HCPCS | Performed by: HOSPITALIST

## 2020-07-07 PROCEDURE — 700102 HCHG RX REV CODE 250 W/ 637 OVERRIDE(OP): Performed by: HOSPITALIST

## 2020-07-07 RX ADMIN — DIVALPROEX SODIUM 250 MG: 250 TABLET, DELAYED RELEASE ORAL at 06:04

## 2020-07-07 RX ADMIN — ARIPIPRAZOLE 2 MG: 2 TABLET ORAL at 22:06

## 2020-07-07 RX ADMIN — RIVAROXABAN 20 MG: 20 TABLET, FILM COATED ORAL at 06:04

## 2020-07-07 RX ADMIN — LEVETIRACETAM 500 MG: 500 TABLET ORAL at 06:04

## 2020-07-07 RX ADMIN — MIRTAZAPINE 15 MG: 15 TABLET, ORALLY DISINTEGRATING ORAL at 22:06

## 2020-07-07 RX ADMIN — DIVALPROEX SODIUM 250 MG: 250 TABLET, DELAYED RELEASE ORAL at 16:59

## 2020-07-07 RX ADMIN — LEVETIRACETAM 500 MG: 500 TABLET ORAL at 16:59

## 2020-07-07 RX ADMIN — ESCITALOPRAM OXALATE 10 MG: 10 TABLET ORAL at 16:59

## 2020-07-07 NOTE — DISCHARGE PLANNING
Hospital Care Management Discharge Planning       Anticipated Discharge Disposition:   · [facility]     Action:   · CM call to [facility] and was able to connect with Matt in Marketing. He verbalizes that the state is now assisting [facility] with taking back pt's who have been hospitalized. He states that they are trying to find staffing so they can take their patients back and will leave a message for Malick to call back this writer.       Barriers to Discharge:   · Appropriate staffing levels at [facility] to take back patients.     Plan:    · Continue to provide support services and assistance with discharge planning as needed.         9161--Received call from pt's dtr Yessi. JANINA spoke to her regarding SNF placement d/t [facility] being unable to take her mother back at this time. Yessi would like to talk to her dad regarding this situation. JANINA emailed [facility] to her so she can research available facilities. Yessi will call back JANINA when her and her dad have reached a decision.

## 2020-07-07 NOTE — PROGRESS NOTES
"[facility] Medicine Daily Progress Note    Date of Service  7/7/2020    Chief Complaint  69 y.o. female admitted 7/3/2020 with covid 19.     Hospital Course    69 y.o. female with past medical history of dementia apparently was sent to hospital due to decreased appetite, patient is poor historian and she denies any symptoms, no sob, no chest pain no palpitation, no headache, no N/V/D/C, no abdominal pain no urinary symptoms, no fever or chills, initial work up in er showed mild leukopenia, vs stable, patient is in not distress nor in pain.       Interval Problem Update  She is axox2  Denies pain, no sob  Ros otherwise negative  Negative covid 19 testing x 2  Per social work \"someone in marketing for SNF finally answered the phone, unclear when they will be able to take patients back\"    Consultants/Specialty  none    Code Status  dnr/dni    Disposition  SNF pending    Review of Systems  Review of Systems   Unable to perform ROS: Dementia        Physical Exam  Temp:  [36.3 °C (97.3 °F)-36.6 °C (97.9 °F)] 36.6 °C (97.8 °F)  Pulse:  [63-68] 63  Resp:  [14-18] 17  BP: (102-133)/(59-64) 131/64  SpO2:  [92 %-94 %] 94 %    Physical Exam  Vitals signs reviewed.   Constitutional:       Appearance: Normal appearance.   HENT:      Head: Normocephalic and atraumatic.      Nose: No congestion.      Mouth/Throat:      Mouth: Mucous membranes are dry.   Eyes:      Extraocular Movements: Extraocular movements intact.      Pupils: Pupils are equal, round, and reactive to light.   Neck:      Musculoskeletal: Normal range of motion and neck supple. No muscular tenderness.   Cardiovascular:      Rate and Rhythm: Normal rate and regular rhythm.      Pulses: Normal pulses.      Heart sounds: Normal heart sounds. No murmur.   Pulmonary:      Effort: Pulmonary effort is normal. No respiratory distress.      Breath sounds: Normal breath sounds. No stridor.   Abdominal:      General: Bowel sounds are normal. There is no distension.      " Palpations: Abdomen is soft.      Tenderness: There is no abdominal tenderness.   Musculoskeletal:         General: No swelling or deformity.   Skin:     General: Skin is warm and dry.      Capillary Refill: Capillary refill takes 2 to 3 seconds.   Neurological:      General: No focal deficit present.      Mental Status: She is alert.   Psychiatric:      Comments: Mildly confused         Fluids  No intake or output data in the 24 hours ending 07/07/20 1232    Laboratory                        Imaging  DX-CHEST-PORTABLE (1 VIEW)   Final Result      1.  There is no acute cardiopulmonary process.           Assessment/Plan  * Suspected COVID-19 virus infection- (present on admission)  Assessment & Plan      Negative covid testing x 2 at [facility], pending SNF placement now.     Early onset Alzheimer's dementia without behavioral disturbance (HCC)- (present on admission)  Assessment & Plan  Stable, cooperative    Paroxysmal atrial fibrillation (HCC)- (present on admission)  Assessment & Plan  Continue xarelto   Rate controlled       VTE prophylaxis: xarelto

## 2020-07-08 PROCEDURE — 700102 HCHG RX REV CODE 250 W/ 637 OVERRIDE(OP): Performed by: HOSPITALIST

## 2020-07-08 PROCEDURE — A9270 NON-COVERED ITEM OR SERVICE: HCPCS | Performed by: HOSPITALIST

## 2020-07-08 PROCEDURE — 99225 PR SUBSEQUENT OBSERVATION CARE,LEVEL II: CPT | Performed by: HOSPITALIST

## 2020-07-08 PROCEDURE — G0378 HOSPITAL OBSERVATION PER HR: HCPCS

## 2020-07-08 RX ADMIN — MIRTAZAPINE 15 MG: 15 TABLET, ORALLY DISINTEGRATING ORAL at 22:10

## 2020-07-08 RX ADMIN — ARIPIPRAZOLE 2 MG: 2 TABLET ORAL at 22:10

## 2020-07-08 RX ADMIN — LEVETIRACETAM 500 MG: 500 TABLET ORAL at 05:05

## 2020-07-08 RX ADMIN — DIVALPROEX SODIUM 250 MG: 250 TABLET, DELAYED RELEASE ORAL at 17:42

## 2020-07-08 RX ADMIN — LEVETIRACETAM 500 MG: 500 TABLET ORAL at 16:55

## 2020-07-08 RX ADMIN — DIVALPROEX SODIUM 250 MG: 250 TABLET, DELAYED RELEASE ORAL at 05:05

## 2020-07-08 RX ADMIN — RIVAROXABAN 20 MG: 20 TABLET, FILM COATED ORAL at 05:05

## 2020-07-08 RX ADMIN — ESCITALOPRAM OXALATE 10 MG: 10 TABLET ORAL at 16:55

## 2020-07-08 NOTE — CARE PLAN
Problem: Safety  Goal: Will remain free from falls  Outcome: PROGRESSING AS EXPECTED  Intervention: Implement fall precautions  Flowsheets  Taken 7/3/2020 1600  Bed Alarm: Yes - Alarm On  Taken 7/8/2020 0900  Environmental Precautions:   Treaded Slipper Socks on Patient   Personal Belongings, Wastebasket, Call Bell etc. in Easy Reach   Transferred to Stronger Side   Report Given to Other Health Care Providers Regarding Fall Risk   Bed in Low Position   Communication Sign for Patients & Families   Mobility Assessed & Appropriate Sign Placed  Bedrails: Alternative to Bedrails Used  Chair/Bed Strip Alarm: Yes - Alarm On     Problem: Knowledge Deficit  Goal: Knowledge of disease process/condition, treatment plan, diagnostic tests, and medications will improve  Outcome: PROGRESSING AS EXPECTED

## 2020-07-08 NOTE — PROGRESS NOTES
[facility] Medicine Daily Progress Note    Date of Service  7/8/2020    Chief Complaint  69 y.o. female admitted 7/3/2020 with covid 19.     Hospital Course    69 y.o. female with past medical history of dementia apparently was sent to hospital due to decreased appetite, patient is poor historian and she denies any symptoms, no sob, no chest pain no palpitation, no headache, no N/V/D/C, no abdominal pain no urinary symptoms, no fever or chills, initial work up in er showed mild leukopenia, vs stable, patient is in not distress nor in pain.       Interval Problem Update  No significant changes  She remains axox2  No symptoms  Denies pain, no sob  Negative covid 19 testing x 2      Consultants/Specialty  none    Code Status  dnr/dni    Disposition  SNF pending, social work sending out new referrals    Review of Systems  Review of Systems   Unable to perform ROS: Dementia        Physical Exam  Temp:  [36.3 °C (97.3 °F)-36.7 °C (98 °F)] 36.4 °C (97.5 °F)  Pulse:  [61-68] 61  Resp:  [16-17] 17  BP: (115-133)/(62-88) 115/71  SpO2:  [92 %-94 %] 93 %    Physical Exam  Vitals signs reviewed.   Constitutional:       Appearance: Normal appearance.   HENT:      Head: Normocephalic and atraumatic.      Nose: No congestion.      Mouth/Throat:      Mouth: Mucous membranes are dry.   Eyes:      Extraocular Movements: Extraocular movements intact.      Pupils: Pupils are equal, round, and reactive to light.   Neck:      Musculoskeletal: Normal range of motion and neck supple. No muscular tenderness.   Cardiovascular:      Rate and Rhythm: Normal rate and regular rhythm.      Pulses: Normal pulses.      Heart sounds: Normal heart sounds. No murmur.   Pulmonary:      Effort: Pulmonary effort is normal. No respiratory distress.      Breath sounds: Normal breath sounds. No stridor.   Abdominal:      General: Bowel sounds are normal. There is no distension.      Palpations: Abdomen is soft.      Tenderness: There is no abdominal  tenderness.   Musculoskeletal:         General: No swelling or deformity.   Skin:     General: Skin is warm and dry.      Capillary Refill: Capillary refill takes 2 to 3 seconds.   Neurological:      General: No focal deficit present.      Mental Status: She is alert.   Psychiatric:      Comments: Mildly confused         Fluids    Intake/Output Summary (Last 24 hours) at 7/8/2020 1349  Last data filed at 7/8/2020 1239  Gross per 24 hour   Intake 420 ml   Output 125 ml   Net 295 ml       Laboratory                        Imaging  DX-CHEST-PORTABLE (1 VIEW)   Final Result      1.  There is no acute cardiopulmonary process.           Assessment/Plan  * Suspected COVID-19 virus infection- (present on admission)  Assessment & Plan      Negative covid testing x 2 at [facility], pending SNF placement     Early onset Alzheimer's dementia without behavioral disturbance (HCC)- (present on admission)  Assessment & Plan  Stable, cooperative    Paroxysmal atrial fibrillation (HCC)- (present on admission)  Assessment & Plan  Continue xarelto   Rate controlled       VTE prophylaxis: xarelto

## 2020-07-09 PROCEDURE — G0378 HOSPITAL OBSERVATION PER HR: HCPCS

## 2020-07-09 PROCEDURE — 700102 HCHG RX REV CODE 250 W/ 637 OVERRIDE(OP): Performed by: HOSPITALIST

## 2020-07-09 PROCEDURE — 99225 PR SUBSEQUENT OBSERVATION CARE,LEVEL II: CPT | Performed by: HOSPITALIST

## 2020-07-09 PROCEDURE — A9270 NON-COVERED ITEM OR SERVICE: HCPCS | Performed by: HOSPITALIST

## 2020-07-09 RX ADMIN — ARIPIPRAZOLE 2 MG: 2 TABLET ORAL at 21:06

## 2020-07-09 RX ADMIN — LEVETIRACETAM 500 MG: 500 TABLET ORAL at 04:59

## 2020-07-09 RX ADMIN — LEVETIRACETAM 500 MG: 500 TABLET ORAL at 17:15

## 2020-07-09 RX ADMIN — DIVALPROEX SODIUM 250 MG: 250 TABLET, DELAYED RELEASE ORAL at 08:00

## 2020-07-09 RX ADMIN — RIVAROXABAN 20 MG: 20 TABLET, FILM COATED ORAL at 08:00

## 2020-07-09 RX ADMIN — DIVALPROEX SODIUM 250 MG: 250 TABLET, DELAYED RELEASE ORAL at 17:15

## 2020-07-09 RX ADMIN — ESCITALOPRAM OXALATE 10 MG: 10 TABLET ORAL at 17:15

## 2020-07-09 RX ADMIN — MIRTAZAPINE 15 MG: 15 TABLET, ORALLY DISINTEGRATING ORAL at 21:06

## 2020-07-09 NOTE — PROGRESS NOTES
[facility] Medicine Daily Progress Note    Date of Service  7/9/2020    Chief Complaint  69 y.o. female admitted 7/3/2020 with covid 19.     Hospital Course    69 y.o. female with past medical history of dementia apparently was sent to hospital due to decreased appetite, patient is poor historian and she denies any symptoms, no sob, no chest pain no palpitation, no headache, no N/V/D/C, no abdominal pain no urinary symptoms, no fever or chills, initial work up in er showed mild leukopenia, vs stable, patient is in not distress nor in pain.       Interval Problem Update  No significant events overnight  Eating breakfast  Denies sob, no pain  She remains axox2  Ros otherwise negative  Negative covid 19 testing x 2    Consultants/Specialty  none    Code Status  dnr/dni    Disposition  SNF pending, social work sent out new referrals as unclear when [facility] will be able to accept her back    Review of Systems  Review of Systems   Unable to perform ROS: Dementia        Physical Exam  Temp:  [35.9 °C (96.7 °F)-36.6 °C (97.9 °F)] 35.9 °C (96.7 °F)  Pulse:  [69-85] 69  Resp:  [16-18] 16  BP: ()/(41-69) 111/41  SpO2:  [95 %-98 %] 95 %    Physical Exam  Vitals signs reviewed.   Constitutional:       Appearance: Normal appearance.   HENT:      Head: Normocephalic and atraumatic.      Nose: No congestion.      Mouth/Throat:      Mouth: Mucous membranes are dry.   Eyes:      Extraocular Movements: Extraocular movements intact.      Pupils: Pupils are equal, round, and reactive to light.   Neck:      Musculoskeletal: Normal range of motion and neck supple. No muscular tenderness.   Cardiovascular:      Rate and Rhythm: Normal rate and regular rhythm.      Pulses: Normal pulses.      Heart sounds: Normal heart sounds. No murmur.   Pulmonary:      Effort: Pulmonary effort is normal. No respiratory distress.      Breath sounds: Normal breath sounds. No stridor.   Abdominal:      General: Bowel sounds are normal. There is no  distension.      Palpations: Abdomen is soft.      Tenderness: There is no abdominal tenderness.   Musculoskeletal:         General: No swelling or deformity.   Skin:     General: Skin is warm and dry.      Capillary Refill: Capillary refill takes 2 to 3 seconds.   Neurological:      General: No focal deficit present.      Mental Status: She is alert.   Psychiatric:      Comments: Mildly confused         Fluids    Intake/Output Summary (Last 24 hours) at 7/9/2020 1125  Last data filed at 7/9/2020 1000  Gross per 24 hour   Intake 1245 ml   Output 125 ml   Net 1120 ml       Laboratory                        Imaging  DX-CHEST-PORTABLE (1 VIEW)   Final Result      1.  There is no acute cardiopulmonary process.           Assessment/Plan  * Suspected COVID-19 virus infection- (present on admission)  Assessment & Plan      Negative covid testing x 2 at [facility], pending SNF placement     Early onset Alzheimer's dementia without behavioral disturbance (HCC)- (present on admission)  Assessment & Plan  Remains cooperative    Paroxysmal atrial fibrillation (HCC)- (present on admission)  Assessment & Plan  Continue xarelto   Rate controlled       VTE prophylaxis: xarelto

## 2020-07-09 NOTE — CARE PLAN
Problem: Pain Management  Goal: Pain level will decrease to patient's comfort goal  Outcome: PROGRESSING AS EXPECTED     Problem: Fluid Volume:  Goal: Will maintain balanced intake and output  Outcome: PROGRESSING AS EXPECTED     Problem: Respiratory:  Goal: Respiratory status will improve  Outcome: PROGRESSING AS EXPECTED

## 2020-07-09 NOTE — DISCHARGE PLANNING
Hospital Care Management Discharge Planning       Anticipated Discharge Disposition:   · [facility] v. SNF     Action:   · CM f/u with dtr Yessi regarding SNF placement. Yessi states she had received a call back from [facility] and they stated that they may be able to take patient back soon. Per Yessi [facility] is requesting pt's COVID testing results. Yessi provided the following fax number ([phone]) and results faxed.     Barriers to Discharge:   · Yessi states she has f/u on facilities on emailed choice form but is having difficulty finding one with a dementia unit. She would like to give [facility] a opportunity to accept her mother back as is deferring SNF choice at this time.     Plan:    · Continue to provide support services and assistance with discharge planning as needed.     1530--CM call to confirm receipt of fax, verified by Matt. Matt states they are at max capacity with their current staffing levels. He is unable to confirm when they can take pt back at this time.

## 2020-07-09 NOTE — CARE PLAN
Problem: Safety  Goal: Will remain free from falls  Outcome: PROGRESSING AS EXPECTED  Intervention: Implement fall precautions  Flowsheets  Taken 7/9/2020 0944  Bed Alarm: Yes - Alarm On  Chair/Bed Strip Alarm: Yes - Alarm On  Taken 7/9/2020 0800  Environmental Precautions:   Treaded Slipper Socks on Patient   Personal Belongings, Wastebasket, Call Bell etc. in Easy Reach   Transferred to Stronger Side   Report Given to Other Health Care Providers Regarding Fall Risk   Bed in Low Position   Communication Sign for Patients & Families   Mobility Assessed & Appropriate Sign Placed  Bedrails: Alternative to Bedrails Used     Problem: Psychosocial Needs:  Goal: Level of anxiety will decrease  Outcome: PROGRESSING AS EXPECTED

## 2020-07-10 PROCEDURE — A9270 NON-COVERED ITEM OR SERVICE: HCPCS | Performed by: HOSPITALIST

## 2020-07-10 PROCEDURE — 99224 PR SUBSEQUENT OBSERVATION CARE,LEVEL I: CPT | Performed by: HOSPITALIST

## 2020-07-10 PROCEDURE — G0378 HOSPITAL OBSERVATION PER HR: HCPCS

## 2020-07-10 PROCEDURE — 700102 HCHG RX REV CODE 250 W/ 637 OVERRIDE(OP): Performed by: HOSPITALIST

## 2020-07-10 RX ADMIN — DIVALPROEX SODIUM 250 MG: 250 TABLET, DELAYED RELEASE ORAL at 16:27

## 2020-07-10 RX ADMIN — MIRTAZAPINE 15 MG: 15 TABLET, ORALLY DISINTEGRATING ORAL at 20:45

## 2020-07-10 RX ADMIN — ARIPIPRAZOLE 2 MG: 2 TABLET ORAL at 20:45

## 2020-07-10 RX ADMIN — ESCITALOPRAM OXALATE 10 MG: 10 TABLET ORAL at 16:28

## 2020-07-10 RX ADMIN — DOCUSATE SODIUM 50 MG AND SENNOSIDES 8.6 MG 2 TABLET: 8.6; 5 TABLET, FILM COATED ORAL at 16:28

## 2020-07-10 RX ADMIN — LEVETIRACETAM 500 MG: 500 TABLET ORAL at 16:28

## 2020-07-10 NOTE — PROGRESS NOTES
"Attempted to pass patient's 0600 medications multiple times.  Patient would not open eyes and repeatedly stated only \"no.\"  Medications rechecked for 0800.  "

## 2020-07-10 NOTE — DISCHARGE PLANNING
Anticipated Discharge Disposition: [facility]    Action: spoke w/ Matt at [facility]. Matt states no staff available to support census and can check back on Monday. Daughter was updated by Matt this morning.  Per chart review, no therapy notes. Pt appears to have no skilled need for SNF.    Barriers to Discharge: [facility] staff    Plan: TBD

## 2020-07-10 NOTE — PROGRESS NOTES
[facility] Medicine Daily Progress Note    Date of Service  7/10/2020    Chief Complaint  69 y.o. female admitted 7/3/2020 with covid 19.     Hospital Course    69 y.o. female with past medical history of dementia apparently was sent to hospital due to decreased appetite, patient is poor historian and she denies any symptoms, no sob, no chest pain no palpitation, no headache, no N/V/D/C, no abdominal pain no urinary symptoms, no fever or chills, initial work up in er showed mild leukopenia, vs stable, patient is in not distress nor in pain.       Interval Problem Update  No changes  She remains alert, axox2  Denies sob, no pain  Ros otherwise negative  Negative covid 19 testing x 2    Consultants/Specialty  none    Code Status  dnr/dni    Disposition  SNF pending, social work sent out new referrals as unclear when [facility] will be able to accept her back    Review of Systems  Review of Systems   Unable to perform ROS: Dementia        Physical Exam  Temp:  [36.4 °C (97.6 °F)-36.8 °C (98.3 °F)] 36.4 °C (97.6 °F)  Pulse:  [63-80] 63  Resp:  [16-18] 18  BP: (101-114)/(50-79) 114/53  SpO2:  [93 %-98 %] 95 %    Physical Exam  Vitals signs reviewed.   Constitutional:       Appearance: Normal appearance.   HENT:      Head: Normocephalic and atraumatic.      Nose: No congestion.      Mouth/Throat:      Mouth: Mucous membranes are dry.   Eyes:      Extraocular Movements: Extraocular movements intact.      Pupils: Pupils are equal, round, and reactive to light.   Neck:      Musculoskeletal: Normal range of motion and neck supple. No muscular tenderness.   Cardiovascular:      Rate and Rhythm: Normal rate and regular rhythm.      Pulses: Normal pulses.      Heart sounds: Normal heart sounds. No murmur.   Pulmonary:      Effort: Pulmonary effort is normal. No respiratory distress.      Breath sounds: Normal breath sounds. No stridor.   Abdominal:      General: Bowel sounds are normal. There is no distension.      Palpations:  Abdomen is soft.      Tenderness: There is no abdominal tenderness.   Musculoskeletal:         General: No swelling or deformity.   Skin:     General: Skin is warm and dry.      Capillary Refill: Capillary refill takes 2 to 3 seconds.   Neurological:      General: No focal deficit present.      Mental Status: She is alert.   Psychiatric:      Comments: Mildly confused         Fluids    Intake/Output Summary (Last 24 hours) at 7/10/2020 1546  Last data filed at 7/10/2020 0800  Gross per 24 hour   Intake 580 ml   Output 500 ml   Net 80 ml       Laboratory                        Imaging  DX-CHEST-PORTABLE (1 VIEW)   Final Result      1.  There is no acute cardiopulmonary process.           Assessment/Plan  * Suspected COVID-19 virus infection- (present on admission)  Assessment & Plan      Negative covid testing x 2 at [facility], pending SNF placement     Early onset Alzheimer's dementia without behavioral disturbance (HCC)- (present on admission)  Assessment & Plan  Remains cooperative    Paroxysmal atrial fibrillation (HCC)- (present on admission)  Assessment & Plan  Continue xarelto   Rate controlled       VTE prophylaxis: xarelto

## 2020-07-11 PROCEDURE — G0378 HOSPITAL OBSERVATION PER HR: HCPCS

## 2020-07-11 PROCEDURE — 99225 PR SUBSEQUENT OBSERVATION CARE,LEVEL II: CPT | Performed by: HOSPITALIST

## 2020-07-11 PROCEDURE — A9270 NON-COVERED ITEM OR SERVICE: HCPCS | Performed by: HOSPITALIST

## 2020-07-11 PROCEDURE — 700102 HCHG RX REV CODE 250 W/ 637 OVERRIDE(OP): Performed by: HOSPITALIST

## 2020-07-11 RX ADMIN — LEVETIRACETAM 500 MG: 500 TABLET ORAL at 17:49

## 2020-07-11 RX ADMIN — MIRTAZAPINE 15 MG: 15 TABLET, ORALLY DISINTEGRATING ORAL at 20:21

## 2020-07-11 RX ADMIN — ESCITALOPRAM OXALATE 10 MG: 10 TABLET ORAL at 17:49

## 2020-07-11 RX ADMIN — ARIPIPRAZOLE 2 MG: 2 TABLET ORAL at 20:21

## 2020-07-11 RX ADMIN — DIVALPROEX SODIUM 250 MG: 250 TABLET, DELAYED RELEASE ORAL at 17:49

## 2020-07-11 NOTE — CARE PLAN
Problem: Safety  Goal: Will remain free from falls  Outcome: PROGRESSING AS EXPECTED  Intervention: Implement fall precautions  Flowsheets  Taken 7/9/2020 0944  Bed Alarm: Yes - Alarm On  Taken 7/11/2020 0900  Environmental Precautions:   Treaded Slipper Socks on Patient   Personal Belongings, Wastebasket, Call Bell etc. in Easy Reach   Transferred to Stronger Side   Report Given to Other Health Care Providers Regarding Fall Risk   Bed in Low Position   Communication Sign for Patients & Families   Mobility Assessed & Appropriate Sign Placed  Bedrails: Alternative to Bedrails Used  Chair/Bed Strip Alarm: Yes - Alarm On     Problem: Mobility  Goal: Risk for activity intolerance will decrease  Outcome: PROGRESSING AS EXPECTED  Intervention: Assess and monitor signs of activity intolerance  Note: Patient up SBA to chair and walk in room

## 2020-07-11 NOTE — PROGRESS NOTES
[facility] Medicine Daily Progress Note    Date of Service  7/11/2020    Chief Complaint  69 y.o. female admitted 7/3/2020 with covid 19.     Hospital Course    69 y.o. female with past medical history of dementia apparently was sent to hospital due to decreased appetite, patient is poor historian and she denies any symptoms, no sob, no chest pain no palpitation, no headache, no N/V/D/C, no abdominal pain no urinary symptoms, no fever or chills, initial work up in er showed mild leukopenia, vs stable, patient is in not distress nor in pain.       Interval Problem Update  No significant events overnight  Remains stable  Alert, axox2  Denies sob, no pain  Ros otherwise negative  Negative covid 19 testing x 2    Consultants/Specialty  none    Code Status  dnr/dni    Disposition  SNF pending, social work sent out new referrals as unclear when [facility] will be able to accept her back    Review of Systems  Review of Systems   Unable to perform ROS: Dementia        Physical Exam  Temp:  [36.5 °C (97.7 °F)-37.1 °C (98.7 °F)] 36.5 °C (97.7 °F)  Pulse:  [65-81] 69  Resp:  [16-19] 16  BP: ()/(41-72) 122/72  SpO2:  [93 %-97 %] 94 %    Physical Exam  Vitals signs reviewed.   Constitutional:       Appearance: Normal appearance.   HENT:      Head: Normocephalic and atraumatic.      Nose: No congestion.      Mouth/Throat:      Mouth: Mucous membranes are dry.   Eyes:      Extraocular Movements: Extraocular movements intact.      Pupils: Pupils are equal, round, and reactive to light.   Neck:      Musculoskeletal: Normal range of motion and neck supple. No muscular tenderness.   Cardiovascular:      Rate and Rhythm: Normal rate and regular rhythm.      Pulses: Normal pulses.      Heart sounds: Normal heart sounds. No murmur.   Pulmonary:      Effort: Pulmonary effort is normal. No respiratory distress.      Breath sounds: Normal breath sounds. No stridor.   Abdominal:      General: Bowel sounds are normal. There is no  distension.      Palpations: Abdomen is soft.      Tenderness: There is no abdominal tenderness.   Musculoskeletal:         General: No swelling or deformity.   Skin:     General: Skin is warm and dry.      Capillary Refill: Capillary refill takes 2 to 3 seconds.   Neurological:      General: No focal deficit present.      Mental Status: She is alert.   Psychiatric:      Comments: Mildly confused         Fluids    Intake/Output Summary (Last 24 hours) at 7/11/2020 1446  Last data filed at 7/11/2020 1300  Gross per 24 hour   Intake 600 ml   Output 900 ml   Net -300 ml       Laboratory                        Imaging  DX-CHEST-PORTABLE (1 VIEW)   Final Result      1.  There is no acute cardiopulmonary process.           Assessment/Plan  * Suspected COVID-19 virus infection- (present on admission)  Assessment & Plan      Negative covid testing x 2 at [facility], still pending SNF placement     Early onset Alzheimer's dementia without behavioral disturbance (HCC)- (present on admission)  Assessment & Plan  Remains cooperative    Paroxysmal atrial fibrillation (HCC)- (present on admission)  Assessment & Plan  Continue xarelto   Remains rate controlled       VTE prophylaxis: xarelto

## 2020-07-11 NOTE — CARE PLAN
Problem: Safety  Goal: Will remain free from falls  Outcome: PROGRESSING AS EXPECTED   Pt is disoriented and confused.  Pt is impulsive and gets oob without assistance.  Bed alarm on for safety.

## 2020-07-12 LAB
ANION GAP SERPL CALC-SCNC: 11 MMOL/L (ref 7–16)
BUN SERPL-MCNC: 12 MG/DL (ref 8–22)
CALCIUM SERPL-MCNC: 8.3 MG/DL (ref 8.5–10.5)
CHLORIDE SERPL-SCNC: 106 MMOL/L (ref 96–112)
CO2 SERPL-SCNC: 26 MMOL/L (ref 20–33)
CREAT SERPL-MCNC: 0.52 MG/DL (ref 0.5–1.4)
GLUCOSE SERPL-MCNC: 85 MG/DL (ref 65–99)
POTASSIUM SERPL-SCNC: 4 MMOL/L (ref 3.6–5.5)
SODIUM SERPL-SCNC: 143 MMOL/L (ref 135–145)

## 2020-07-12 PROCEDURE — 700102 HCHG RX REV CODE 250 W/ 637 OVERRIDE(OP): Performed by: HOSPITALIST

## 2020-07-12 PROCEDURE — A9270 NON-COVERED ITEM OR SERVICE: HCPCS | Performed by: HOSPITALIST

## 2020-07-12 PROCEDURE — 80048 BASIC METABOLIC PNL TOTAL CA: CPT

## 2020-07-12 PROCEDURE — 36415 COLL VENOUS BLD VENIPUNCTURE: CPT

## 2020-07-12 PROCEDURE — 99224 PR SUBSEQUENT OBSERVATION CARE,LEVEL I: CPT | Performed by: HOSPITALIST

## 2020-07-12 PROCEDURE — G0378 HOSPITAL OBSERVATION PER HR: HCPCS

## 2020-07-12 RX ADMIN — LEVETIRACETAM 500 MG: 500 TABLET ORAL at 05:12

## 2020-07-12 RX ADMIN — ESCITALOPRAM OXALATE 10 MG: 10 TABLET ORAL at 17:27

## 2020-07-12 RX ADMIN — ARIPIPRAZOLE 2 MG: 2 TABLET ORAL at 20:26

## 2020-07-12 RX ADMIN — LEVETIRACETAM 500 MG: 500 TABLET ORAL at 17:27

## 2020-07-12 RX ADMIN — DIVALPROEX SODIUM 250 MG: 250 TABLET, DELAYED RELEASE ORAL at 17:29

## 2020-07-12 RX ADMIN — DOCUSATE SODIUM 50 MG AND SENNOSIDES 8.6 MG 2 TABLET: 8.6; 5 TABLET, FILM COATED ORAL at 17:27

## 2020-07-12 RX ADMIN — RIVAROXABAN 20 MG: 20 TABLET, FILM COATED ORAL at 05:12

## 2020-07-12 RX ADMIN — DIVALPROEX SODIUM 250 MG: 250 TABLET, DELAYED RELEASE ORAL at 05:12

## 2020-07-12 RX ADMIN — MIRTAZAPINE 15 MG: 15 TABLET, ORALLY DISINTEGRATING ORAL at 20:26

## 2020-07-12 ASSESSMENT — FIBROSIS 4 INDEX: FIB4 SCORE: 2.45

## 2020-07-12 NOTE — CARE PLAN
Problem: Safety  Goal: Will remain free from injury  Outcome: PROGRESSING AS EXPECTED  Note: Bed alarm on, call light within reach & hourly rounding in place. Pt wandered onto Neuroscience unit last night - keeping a close eye on her today in order to ensure this doesn't happen again. Desk bed.     Problem: Discharge Barriers/Planning  Goal: Patient's continuum of care needs will be met  Outcome: PROGRESSING SLOWER THAN EXPECTED  Note: Pt ready to go back to [facility]. However, facility unable to take her back until possibly Monday due to low staffing. Case management involved.

## 2020-07-12 NOTE — PROGRESS NOTES
AAOx1, self. Irritable this AM. Prefers to not speak to this RN & just sleep. 0/10 pain, declining intervention at this time. -N/V. -N/T. Denies new onset of chest pain/SOB. +BS in all 4 quadrants, last BM yesterday. Room air, satting > 90%. Pt found wandering on neuroscience unit last night per NOC RN - keeping a close eye on pt throughout the day to ensure this doesn't happen again. POC discussed, denies further needs at this time. Bed alarm on, call light within reach & hourly rounding in place.

## 2020-07-12 NOTE — PROGRESS NOTES
[facility] Medicine Daily Progress Note    Date of Service  7/12/2020    Chief Complaint  69 y.o. female admitted 7/3/2020 with covid 19.     Hospital Course    69 y.o. female with past medical history of dementia apparently was sent to hospital due to decreased appetite, patient is poor historian and she denies any symptoms, no sob, no chest pain no palpitation, no headache, no N/V/D/C, no abdominal pain no urinary symptoms, no fever or chills, initial work up in er showed mild leukopenia, vs stable, patient is in not distress nor in pain.       Interval Problem Update  She remains without complaints  No changes  Alert, axox2  Denies sob, no pain  Ros otherwise negative  Negative covid 19 testing x 2    Consultants/Specialty  none    Code Status  dnr/dni    Disposition  SNF pending, social work sent out new referrals as unclear when [facility] will be able to accept her back    Review of Systems  Review of Systems   Unable to perform ROS: Dementia        Physical Exam  Temp:  [36.6 °C (97.9 °F)-37.1 °C (98.7 °F)] 36.7 °C (98.1 °F)  Pulse:  [62-67] 63  Resp:  [16] 16  BP: (104-128)/(56-72) 128/66  SpO2:  [93 %-97 %] 94 %    Physical Exam  Vitals signs reviewed.   Constitutional:       Appearance: Normal appearance.   HENT:      Head: Normocephalic and atraumatic.      Nose: No congestion.      Mouth/Throat:      Mouth: Mucous membranes are dry.   Eyes:      Extraocular Movements: Extraocular movements intact.      Pupils: Pupils are equal, round, and reactive to light.   Neck:      Musculoskeletal: Normal range of motion and neck supple. No muscular tenderness.   Cardiovascular:      Rate and Rhythm: Normal rate and regular rhythm.      Pulses: Normal pulses.      Heart sounds: Normal heart sounds. No murmur.   Pulmonary:      Effort: Pulmonary effort is normal. No respiratory distress.      Breath sounds: Normal breath sounds. No stridor.   Abdominal:      General: Bowel sounds are normal. There is no distension.       Palpations: Abdomen is soft.      Tenderness: There is no abdominal tenderness.   Musculoskeletal:         General: No swelling or deformity.   Skin:     General: Skin is warm and dry.      Capillary Refill: Capillary refill takes 2 to 3 seconds.   Neurological:      General: No focal deficit present.      Mental Status: She is alert.   Psychiatric:      Comments: Mildly confused         Fluids    Intake/Output Summary (Last 24 hours) at 7/12/2020 1240  Last data filed at 7/11/2020 1300  Gross per 24 hour   Intake 350 ml   Output --   Net 350 ml       Laboratory      Recent Labs     07/12/20  0325   SODIUM 143   POTASSIUM 4.0   CHLORIDE 106   CO2 26   GLUCOSE 85   BUN 12   CREATININE 0.52   CALCIUM 8.3*                   Imaging  DX-CHEST-PORTABLE (1 VIEW)   Final Result      1.  There is no acute cardiopulmonary process.           Assessment/Plan  * Suspected COVID-19 virus infection- (present on admission)  Assessment & Plan      Negative covid testing x 2 at [facility], still pending SNF placement     Early onset Alzheimer's dementia without behavioral disturbance (HCC)- (present on admission)  Assessment & Plan  Remains cooperative    Paroxysmal atrial fibrillation (HCC)- (present on admission)  Assessment & Plan  Continue xarelto   Remains rate controlled       VTE prophylaxis: xarelto

## 2020-07-13 VITALS
RESPIRATION RATE: 16 BRPM | WEIGHT: 139.11 LBS | HEIGHT: 64 IN | BODY MASS INDEX: 23.75 KG/M2 | HEART RATE: 66 BPM | TEMPERATURE: 97.8 F | DIASTOLIC BLOOD PRESSURE: 70 MMHG | SYSTOLIC BLOOD PRESSURE: 119 MMHG | OXYGEN SATURATION: 96 %

## 2020-07-13 PROBLEM — Z20.822 SUSPECTED COVID-19 VIRUS INFECTION: Status: RESOLVED | Noted: 2020-07-03 | Resolved: 2020-07-13

## 2020-07-13 PROCEDURE — 99217 PR OBSERVATION CARE DISCHARGE: CPT | Performed by: HOSPITALIST

## 2020-07-13 PROCEDURE — G0378 HOSPITAL OBSERVATION PER HR: HCPCS

## 2020-07-13 NOTE — DISCHARGE PLANNING
Received Transport Form @ 7036  Spoke to Pranay @ [facility]    Transport is scheduled for 07/13/20@ 1200 going to [facility].

## 2020-07-13 NOTE — DISCHARGE INSTRUCTIONS
Alzheimer's Disease  Alzheimer's disease is a brain disease that affects memory, thinking, language, and behavior. People with Alzheimer's disease lose mental abilities, and the disease gets worse over time. Alzheimer's disease is a form of dementia.  What are the causes?  This condition develops when a protein called beta-amyloid forms deposits in the brain. It is not known what causes these deposits to form.  Alzheimer's disease may also be caused by a gene mutation that is inherited from one parent or both parents. A gene mutation is a harmful change in a gene. Not everyone who inherits the genetic mutation will get the disease.  What increases the risk?  You are more likely to develop this condition if you:  · Are older than age 65.  · Have a family history of dementia.  · Have had a brain injury.  · Have heart or blood vessel disease.  · Have had a stroke.  · Have high blood pressure or high cholesterol.  · Have diabetes.  What are the signs or symptoms?  Symptoms of this condition may happen in three stages, which often overlap.  Early stage  In this stage, you may continue to be independent. You may still be able to drive, work, and be social. Symptoms in this stage include:  · Minor memory problems, such as forgetting a name or what you read.  · Difficulty with:  ? Paying attention.  ? Communicating.  ? Doing familiar tasks.  ? Problem solving or doing calculations.  ? Following instructions.  ? Learning new things.  · Anxiety.  · Social withdrawal.  · Loss of motivation.  Moderate stage  In this stage, you will start to need care. Symptoms in this stage include:  · Difficulty with expressing thoughts.  · Memory loss that affects daily life. This can include forgetting:  ? Your address or phone number.  ? Recent events that have happened.  ? Parts of your personal history, such as where you went to school.  · Confusion about where you are or what time it is.  · Difficulty in judging distance.  · Changes in  personality, mood, and behavior. You may be carbajal, irritable, angry, frustrated, fearful, anxious, or suspicious.  · Poor reasoning and judgment.  · Delusions or hallucinations.  · Changes in sleep patterns.  · Wandering and getting lost, even in familiar places.  Severe stage  In the final stage, you will need help with your personal care and daily activities. Symptoms in this stage include:  · Worsening memory loss.  · Personality changes.  · Loss of awareness of your surroundings.  · Changes in physical abilities, including the ability to walk, sit, and swallow.  · Difficulty in communicating.  · Inability to control your bladder and bowels.  · Increasing confusion.  · Increasing behavior changes.  How is this diagnosed?  This condition is diagnosed by a health care provider who specializes in diseases of the nervous system (neurologist). Other causes of dementia may also be ruled out. Your health care provider will talk with you and your family, friends, or caregivers about your history and symptoms.  A thorough medical history will be taken, and you will have a physical exam and tests. Tests may include:  · Lab tests, such as blood or urine tests.  · Imaging tests, such as a CT scan, a PET scan, or an MRI.  · A lumbar puncture. This test involves removing and testing a small amount of the fluid that surrounds the brain and spinal cord.  · An electroencephalogram (EEG). In this test, small metal discs are used to measure electrical activity in the brain.  · Memory tests, cognitive tests, and neuropsychological tests. These tests evaluate brain function.  · Genetic testing may be done if you have early onset of the disease (before age 60) or if other family members have the disease.  How is this treated?  At this time, there is no treatment to cure Alzheimer's disease or stop it from getting worse. The goals of treatment are:  · To slow down symptoms of the disease, if possible.  · To manage behavioral  changes.  · To provide you with a safe environment.  · To help manage daily life for you and your caregivers.  The following treatment options are available:  · Medicines. Medicines may help to slow down memory loss and manage behavioral symptoms.  · Cognitive therapy. Cognitive therapy provides you with education, support, and memory aids. It is most helpful in the early stages of the condition.  · Counseling or spiritual guidance. It is normal to have a lot of feelings, including anger, relief, fear, and isolation. Counseling and guidance can help you deal with these feelings.  · Caregiving. This involves having caregivers help you with your daily activities.  · Family support groups. These provide education, emotional support, and information about community resources to family members who are taking care of you.  Follow these instructions at home:    Medicines  · Take over-the-counter and prescription medicines only as told by your health care provider.  · Use a pill organizer or pill reminder to help you manage your medicines.  · Avoid taking medicines that can affect thinking, such as pain medicines or sleeping medicines.  Lifestyle  · Make healthy lifestyle choices:  ? Be physically active as told by your health care provider. Regular exercise may help improve symptoms.  ? Do not use any products that contain nicotine or tobacco, such as cigarettes, e-cigarettes, and chewing tobacco. If you need help quitting, ask your health care provider.  ? Do not drink alcohol.  ? Eat a healthy diet.  ? Practice stress-management techniques when you get stressed.  ? Stay social.  · Drink enough fluid to keep your urine pale yellow.  · Make sure to get quality sleep.  ? Avoid taking long naps during the day. Take short naps of 30 minutes or less if needed.  ? Keep your sleeping area dark and cool.  ? Avoid exercising during the few hours before you go to bed.  ? Avoid caffeine products in the afternoon and  evening.  General instructions  · Work with your health care provider to determine what you need help with and what your safety needs are.  · If you were given a bracelet that identifies you as a person with memory loss or tracks your location, make sure to wear it at all times.  · Talk with your health care provider about whether it is safe for you to drive.  · Work with your family to make important decisions, such as advance directives, medical power of , or a living will.  · Keep all follow-up visits as told by your health care provider. This is important.  Where to find more information  · The Alzheimer's Association: Call the 24-hour helpline at 1-210.633.7967, or visit www.alz.org  Contact a health care provider if:  · You have nausea, vomiting, or trouble with eating.  · You have dizziness or weakness.  · You or your family members become concerned for your safety.  Get help right away if:  · You feel depressed or sad, or feel that you want to harm yourself.  · You develop chest pain or difficulty with breathing.  · You pass out.  If you ever feel like you may hurt yourself or others, or have thoughts about taking your own life, get help right away. You can go to your nearest emergency department or call:  · Your local emergency services (911 in the [place]  · A suicide crisis helpline, such as the National Suicide Prevention Lifeline at 1-154.306.5095. This is open 24 hours a day.  Summary  · Alzheimer's disease is a brain disease that affects memory, thinking, language, and behavior. Alzheimer's disease is a form of dementia.  · This condition is diagnosed by a specialist in diseases of the nervous system (neurologist).  · At this time, there is no treatment to cure Alzheimer's disease or stop it from getting worse. The goals of treatment are to slow memory loss and help you manage any symptoms.  · Work with your family to make important decisions, such as advance directives, medical power of  , or a living will.  This information is not intended to replace advice given to you by your health care provider. Make sure you discuss any questions you have with your health care provider.  Document Released: 08/29/2005 Document Revised: 11/26/2019 Document Reviewed: 11/26/2019  [employer] Patient Education © 2020 [employer]    Discharge Instructions    Discharged to other by medical transportation with escort. Discharged via wheelchair, hospital escort: Yes.  Special equipment needed: Not Applicable    Be sure to schedule a follow-up appointment with your primary care doctor or any specialists as instructed.     Discharge Plan:   Diet Plan: Discussed  Activity Level: Discussed  Confirmed Follow up Appointment: Patient to Call and Schedule Appointment  Confirmed Symptoms Management: Discussed  Medication Reconciliation Updated: Yes    I understand that a diet low in cholesterol, fat, and sodium is recommended for good health. Unless I have been given specific instructions below for another diet, I accept this instruction as my diet prescription.   Other diet: Regular diet as tolerated    Special Instructions: None    · Is patient discharged on Warfarin / Coumadin?   No     Depression / Suicide Risk    As you are discharged from this [facility] facility, it is important to learn how to keep safe from harming yourself.    Recognize the warning signs:  · Abrupt changes in personality, positive or negative- including increase in energy   · Giving away possessions  · Change in eating patterns- significant weight changes-  positive or negative  · Change in sleeping patterns- unable to sleep or sleeping all the time   · Unwillingness or inability to communicate  · Depression  · Unusual sadness, discouragement and loneliness  · Talk of wanting to die  · Neglect of personal appearance   · Rebelliousness- reckless behavior  · Withdrawal from people/activities they love  · Confusion- inability to  concentrate     If you or a loved one observes any of these behaviors or has concerns about self-harm, here's what you can do:  · Talk about it- your feelings and reasons for harming yourself  · Remove any means that you might use to hurt yourself (examples: pills, rope, extension cords, firearm)  · Get professional help from the community (Mental Health, Substance Abuse, psychological counseling)  · Do not be alone:Call your Safe Contact- someone whom you trust who will be there for you.  · Call your local CRISIS HOTLINE 834-7737 or 368-845-9953  · Call your local Children's Mobile Crisis Response Team [place] (232) 158-1973 or www.[employer]pect Network  · Call the toll free National Suicide Prevention Hotlines   · National Suicide Prevention Lifeline 762-194-JEIN (7766)  · National Hope Line Network 800-SUICIDE (582-6721)

## 2020-07-13 NOTE — PROGRESS NOTES
Discharge orders acknowledged, Report given to Malick at [facility] [phone] and Pt daughter (Yessi) made aware pt will be transported to facility at .

## 2020-07-13 NOTE — CARE PLAN
Problem: Safety  Goal: Will remain free from injury  Outcome: PROGRESSING AS EXPECTED  Note: Pt remains free from injury, Floor clear from clutter and cords.  Pt A+OX1, Non-Skid yellow socks, Bed/chair alarm on. Proper signs outside pt door in place. Door remains open.  Pt uses call light appropriately. Call light with in reach of pt.  Hourly rounding in place.      Problem: Respiratory:  Goal: Respiratory status will improve  Outcome: PROGRESSING AS EXPECTED     Problem: Skin Integrity  Goal: Risk for impaired skin integrity will decrease  Outcome: PROGRESSING AS EXPECTED     Problem: Communication  Goal: The ability to communicate needs accurately and effectively will improve  Note: Pt AOx4 d/t dementia, continuous educations in place

## 2020-07-13 NOTE — DISCHARGE PLANNING
Anticipated Discharge Disposition: [facility]    Action: Spoke with Malick, administer with [facility], he verbalized patient meets criteria and bed availabilty to be accepted back to the facility. RN CM notified Yessi, pt's daughter and POA, and BS RN. Faxed resent negative COVID results to [facility] at [phone]. Faxed transport request, with [employer], to [facility]. Transport verified by [facility] for 1200, family, MD, BS RN all notified. DC Packet completed and placed in the pt's chart     Barriers to Discharge: none    Plan: continue to offer discharge support

## 2020-07-13 NOTE — PROGRESS NOTES
Discharge orders acknowledged, Pt aware and compliant with new orders, D/C teaching completed, Pt able to verbalize needs and complaint with discharge orders. Report/ updates given to Tk Brock and daughter.

## 2020-07-13 NOTE — DISCHARGE SUMMARY
Discharge Summary    CHIEF COMPLAINT ON ADMISSION  Chief Complaint   Patient presents with   • Medical Clearance       Reason for Admission  EMS      Admission Date  7/3/2020    CODE STATUS  DNAR/DNI    HPI & HOSPITAL COURSE  Patient is a 69 y.o. female with past medical history of dementia apparently was sent to the hospital due to decreased appetite and suspected COVID infection from her memory care center. Here COVID testes were negative x 2 and she had no acute medical issues other than mild leukopenia.  She remained here until her memory care center was able to take her back.     Therefore, she is discharged in fair and stable condition to skilled nursing facility.    The patient met 2-midnight criteria for an inpatient stay at the time of discharge.    Discharge Date  7/13/20    FOLLOW UP ITEMS POST DISCHARGE  pcp    DISCHARGE DIAGNOSES  Principal Problem (Resolved):    Suspected COVID-19 virus infection POA: Yes  Active Problems:    Early onset Alzheimer's dementia without behavioral disturbance (HCC) POA: Yes    Paroxysmal atrial fibrillation (HCC) POA: Yes  Resolved Problems:    Leukopenia POA: Yes      FOLLOW UP  No future appointments.  Rachelle Isaac M.D.  [address] [place] [id number] [phone]      Please call to schedule your appointment with your Primary Care Physician as needed. Thank you         In 2 weeks        MEDICATIONS ON DISCHARGE     Medication List      CONTINUE taking these medications      Instructions   ARIPiprazole 2 MG tablet  Commonly known as:  ABILIFY   Take 2 mg by mouth every bedtime.  Dose:  2 mg     divalproex 250 MG Tbec  Commonly known as:  DEPAKOTE   Take 1 Tab by mouth 2 Times a Day. TAKE 1 TABLET BY MOUTH TWICE A DAY  Dose:  1 Tab     escitalopram 10 MG Tabs  Commonly known as:  LEXAPRO   Take 10 mg by mouth every evening.  Dose:  10 mg     levetiracetam 500 MG Tb24  Commonly known as:  KEPPRA   Take 1,000 mg by mouth every evening.  Dose:  1,000 mg      mirtazapine 15 MG Tbdp  Commonly known as:  REMERON   Take 15 mg by mouth every evening.  Dose:  15 mg     Xarelto 20 MG Tabs tablet  Generic drug:  rivaroxaban   Take 20 mg by mouth every day.  Dose:  20 mg            Allergies  Allergies   Allergen Reactions   • Other Food Unspecified     Corn   • Penicillins Rash     Minor RASH  Received as a young child       DIET  Orders Placed This Encounter   Procedures   • Diet Order Regular     Standing Status:   Standing     Number of Occurrences:   1     Order Specific Question:   Diet:     Answer:   Regular [1]       ACTIVITY  As tolerated.  Weight bearing as tolerated    CONSULTATIONS      PROCEDURES  DX-CHEST-PORTABLE (1 VIEW)   Final Result      1.  There is no acute cardiopulmonary process.            LABORATORY  Lab Results   Component Value Date    SODIUM 143 07/12/2020    POTASSIUM 4.0 07/12/2020    CHLORIDE 106 07/12/2020    CO2 26 07/12/2020    GLUCOSE 85 07/12/2020    BUN 12 07/12/2020    CREATININE 0.52 07/12/2020        Lab Results   Component Value Date    WBC 3.2 (L) 07/04/2020    HEMOGLOBIN 13.4 07/04/2020    HEMATOCRIT 40.8 07/04/2020    PLATELETCT 187 07/04/2020        Total time of the discharge process exceeds 30 minutes.

## 2020-10-22 NOTE — DIETARY
Nutrition Services:    Poor PO & Unsure Wt Loss on Nutrition Admit Screen. Pt is currently on a regular diet and per chart pt PO % most meals. Pt is eating well. Ht: 162.6 cm, Wt 7/3: 61.1 kg via stand up scale, BMI 23.11 - Normal. Per chart review pt's wt on 2/13 was 56.3 kg. No wt loss noted.      Consult RD as needed. RD will re-screen weekly.      RD available prn    [Negative] : Heme/Lymph

## 2021-01-01 ENCOUNTER — HOME CARE VISIT (OUTPATIENT)
Dept: HOSPICE | Facility: HOSPICE | Age: 71
End: 2021-01-01
Payer: MEDICARE

## 2021-01-01 ENCOUNTER — HOSPICE ADMISSION (OUTPATIENT)
Dept: HOSPICE | Facility: HOSPICE | Age: 71
End: 2021-01-01
Payer: MEDICARE

## 2021-01-01 VITALS — DIASTOLIC BLOOD PRESSURE: 58 MMHG | HEART RATE: 80 BPM | RESPIRATION RATE: 10 BRPM | SYSTOLIC BLOOD PRESSURE: 96 MMHG

## 2021-01-01 VITALS — SYSTOLIC BLOOD PRESSURE: 102 MMHG | RESPIRATION RATE: 12 BRPM | DIASTOLIC BLOOD PRESSURE: 60 MMHG | HEART RATE: 80 BPM

## 2021-01-01 VITALS — HEART RATE: 80 BPM | RESPIRATION RATE: 7 BRPM | DIASTOLIC BLOOD PRESSURE: 70 MMHG | SYSTOLIC BLOOD PRESSURE: 98 MMHG

## 2021-01-01 VITALS — RESPIRATION RATE: 20 BRPM

## 2021-01-01 VITALS — SYSTOLIC BLOOD PRESSURE: 118 MMHG | RESPIRATION RATE: 12 BRPM | HEART RATE: 80 BPM | DIASTOLIC BLOOD PRESSURE: 80 MMHG

## 2021-01-01 VITALS — HEART RATE: 92 BPM

## 2021-01-01 VITALS — RESPIRATION RATE: 20 BRPM | SYSTOLIC BLOOD PRESSURE: 55 MMHG | DIASTOLIC BLOOD PRESSURE: 40 MMHG | HEART RATE: 120 BPM

## 2021-01-01 VITALS — DIASTOLIC BLOOD PRESSURE: 58 MMHG | HEART RATE: 86 BPM | SYSTOLIC BLOOD PRESSURE: 102 MMHG | RESPIRATION RATE: 6 BRPM

## 2021-01-01 VITALS — DIASTOLIC BLOOD PRESSURE: 80 MMHG | HEART RATE: 104 BPM | RESPIRATION RATE: 6 BRPM | SYSTOLIC BLOOD PRESSURE: 118 MMHG

## 2021-01-01 VITALS — HEART RATE: 140 BPM | RESPIRATION RATE: 12 BRPM

## 2021-01-01 DIAGNOSIS — Z51.5 PALLIATIVE CARE ENCOUNTER: ICD-10-CM

## 2021-01-01 DIAGNOSIS — R52 PAIN: ICD-10-CM

## 2021-01-01 DIAGNOSIS — I67.2 CEREBRAL ATHEROSCLEROSIS: Primary | ICD-10-CM

## 2021-01-01 DIAGNOSIS — Z23 NEED FOR VACCINATION: ICD-10-CM

## 2021-01-01 DIAGNOSIS — Z51.5 PALLIATIVE CARE ENCOUNTER: Primary | ICD-10-CM

## 2021-01-01 DIAGNOSIS — F41.9 ANXIETY: ICD-10-CM

## 2021-01-01 PROCEDURE — G0155 HHCP-SVS OF CSW,EA 15 MIN: HCPCS

## 2021-01-01 PROCEDURE — 6650990 HC HOSPICE AND HOME CARE RX REV CODE 0250

## 2021-01-01 PROCEDURE — G0299 HHS/HOSPICE OF RN EA 15 MIN: HCPCS

## 2021-01-01 PROCEDURE — S9126 HOSPICE CARE, IN THE HOME, P: HCPCS

## 2021-01-01 PROCEDURE — G0156 HHCP-SVS OF AIDE,EA 15 MIN: HCPCS

## 2021-01-01 PROCEDURE — 665036 HSPC NOTICE OF ELECTION NOE

## 2021-01-01 RX ORDER — HALOPERIDOL 2 MG/ML
0.5 SOLUTION ORAL EVERY 8 HOURS
Qty: 15 ML | Refills: 0 | Status: SHIPPED | OUTPATIENT
Start: 2021-01-01 | End: 2021-01-01 | Stop reason: SDUPTHER

## 2021-01-01 RX ORDER — LORAZEPAM 2 MG/ML
0.5 CONCENTRATE ORAL EVERY 4 HOURS
Qty: 360 ML | Refills: 0 | Status: SHIPPED
Start: 2021-01-01 | End: 2021-01-01

## 2021-01-01 RX ORDER — MORPHINE SULFATE 100 MG/5ML
5 SOLUTION ORAL EVERY 8 HOURS
Qty: 30 ML | Refills: 0 | Status: SHIPPED
Start: 2021-01-01 | End: 2021-01-01

## 2021-01-01 RX ORDER — MORPHINE SULFATE 100 MG/5ML
10 SOLUTION ORAL EVERY 6 HOURS
Qty: 30 ML | Refills: 0 | Status: SHIPPED | OUTPATIENT
Start: 2021-01-01 | End: 2021-01-01 | Stop reason: SDUPTHER

## 2021-01-01 RX ORDER — MORPHINE SULFATE 100 MG/5ML
10 SOLUTION ORAL EVERY 6 HOURS
Qty: 30 ML | Refills: 0 | Status: SHIPPED
Start: 2021-01-01 | End: 2021-01-01

## 2021-01-01 RX ORDER — HALOPERIDOL 2 MG/ML
0.5 SOLUTION ORAL EVERY 8 HOURS
Qty: 15 ML | Refills: 0 | Status: SHIPPED
Start: 2021-01-01 | End: 2021-01-01

## 2021-01-01 SDOH — ECONOMIC STABILITY: GENERAL

## 2021-01-01 ASSESSMENT — PAIN SCALES - PAIN ASSESSMENT IN ADVANCED DEMENTIA (PAINAD)
BODYLANGUAGE: 0 - RELAXED.
TOTALSCORE: 6
NEGVOCALIZATION: 1 - OCCASIONAL MOAN OR GROAN. LOW-LEVEL SPEECH WITH A NEGATIVE OR DISAPPROVING QUALITY.
FACIALEXPRESSION: 2 - FACIAL GRIMACING.
NEGVOCALIZATION: 0 - NONE.
FACIALEXPRESSION: 2 - FACIAL GRIMACING.
NEGVOCALIZATION: 0 - NONE.
BODYLANGUAGE: 0 - RELAXED.
BODYLANGUAGE: 2 - RIGID. FISTS CLENCHED. KNEES PULLED UP. PULLING OR PUSHING AWAY. STRIKING OUT.
CONSOLABILITY: 0 - NO NEED TO CONSOLE.
FACIALEXPRESSION: 0 - SMILING OR INEXPRESSIVE.
FACIALEXPRESSION: 2 - FACIAL GRIMACING.
CONSOLABILITY: 0 - NO NEED TO CONSOLE.
NEGVOCALIZATION: 1 - OCCASIONAL MOAN OR GROAN. LOW-LEVEL SPEECH WITH A NEGATIVE OR DISAPPROVING QUALITY.
NEGVOCALIZATION: 0 - NONE.
BODYLANGUAGE: 1 - TENSE. DISTRESSED PACING. FIDGETING.
CONSOLABILITY: 1 - DISTRACTED OR REASSURED BY VOICE OR TOUCH.
TOTALSCORE: 2
TOTALSCORE: 7
TOTALSCORE: 0
FACIALEXPRESSION: 1 - SAD. FRIGHTENED. FROWN.
CONSOLABILITY: 0 - NO NEED TO CONSOLE.
FACIALEXPRESSION: 0 - SMILING OR INEXPRESSIVE.
TOTALSCORE: 2
BODYLANGUAGE: 0 - RELAXED.
FACIALEXPRESSION: 0 - SMILING OR INEXPRESSIVE.
CONSOLABILITY: 1 - DISTRACTED OR REASSURED BY VOICE OR TOUCH.
NEGVOCALIZATION: 1 - OCCASIONAL MOAN OR GROAN. LOW-LEVEL SPEECH WITH A NEGATIVE OR DISAPPROVING QUALITY.

## 2021-01-01 ASSESSMENT — ENCOUNTER SYMPTOMS
FATIGUE: 1
FATIGUES EASILY: 1
UNABLE TO COMMUNICATE PAIN: 1
PAIN: 1
PERSON REPORTING PAIN: FAMILY
PERSON REPORTING PAIN: DIRECT OBSERVATION
STOOL FREQUENCY: LESS THAN DAILY
SOMNOLENCE: 1
PERSON REPORTING PAIN: PATIENT
FATIGUE: 1
SLEEP QUALITY: ADEQUATE
STOOL FREQUENCY: LESS THAN DAILY
FATIGUE: 1
FATIGUES EASILY: 1
LAST BOWEL MOVEMENT: 65962
LAST BOWEL MOVEMENT: 65962
SOMNOLENCE: 1
STOOL FREQUENCY: LESS THAN DAILY
FATIGUE: 1
SOMNOLENCE: 1
BOWEL INCONTINENCE: 1
BOWEL INCONTINENCE: 1
UNABLE TO COMMUNICATE PAIN: 1
FATIGUES EASILY: 1
BOWEL INCONTINENCE: 1
FATIGUES EASILY: 1
FATIGUES EASILY: 1
SOMNOLENCE: 1
FATIGUES EASILY: 1
STOOL FREQUENCY: LESS THAN DAILY
DENIES PAIN: 1
DENIES PAIN: 1
STOOL FREQUENCY: LESS THAN DAILY
BOWEL INCONTINENCE: 1
LAST BOWEL MOVEMENT: 65962
BOWEL INCONTINENCE: 1
LAST BOWEL MOVEMENT: 65962
SOMNOLENCE: 1
PERSON REPORTING PAIN: PATIENT
FATIGUE: 1
SLEEP QUALITY: ADEQUATE
FATIGUE: 1
SOMNOLENCE: 1
DENIES PAIN: 1
PERSON REPORTING PAIN: PATIENT
FATIGUES EASILY: 1
LAST BOWEL MOVEMENT: 65960

## 2021-01-01 ASSESSMENT — ACTIVITIES OF DAILY LIVING (ADL)
AMBULATION_REQUIRES_ASSISTANCE: 1
MONEY MANAGEMENT (EXPENSES/BILLS): TOTALLY DEPENDENT
DRESSING_REQUIRES_ASSISTANCE: 1
BATHING_REQUIRES_ASSISTANCE: 1
CONTINENCE_REQUIRES_ASSISTANCE: 1
PHYSICAL_TRANSFER_REQUIRES_ASSISTANCE: 1
MONEY MANAGEMENT (EXPENSES/BILLS): TOTALLY DEPENDENT
EATING_REQUIRES_ASSISTANCE: 1

## 2021-01-01 ASSESSMENT — SOCIAL DETERMINANTS OF HEALTH (SDOH)
ACTIVE STRESSOR - NO STRESS FACTORS: 1
ACTIVE STRESSOR - NO STRESS FACTORS: 1

## 2021-01-01 ASSESSMENT — PATIENT HEALTH QUESTIONNAIRE - PHQ9: CLINICAL INTERPRETATION OF PHQ2 SCORE: 0

## 2021-04-01 NOTE — ED NOTES
Med Rec complete per Pt's  and Daughter at bedside   Pt unable to participate in interview  Allergies Reviewed  No ABX in the last 14 days    Pt takes XARELTO   intact